# Patient Record
Sex: MALE | Race: BLACK OR AFRICAN AMERICAN | Employment: OTHER | URBAN - METROPOLITAN AREA
[De-identification: names, ages, dates, MRNs, and addresses within clinical notes are randomized per-mention and may not be internally consistent; named-entity substitution may affect disease eponyms.]

---

## 2017-03-20 ENCOUNTER — HOSPITAL ENCOUNTER (OUTPATIENT)
Dept: INFUSION THERAPY | Age: 82
Discharge: HOME | End: 2017-03-20
Payer: MEDICARE

## 2017-03-20 ENCOUNTER — OFFICE VISIT (OUTPATIENT)
Dept: ONCOLOGY | Age: 82
End: 2017-03-20

## 2017-03-20 ENCOUNTER — HOSPITAL ENCOUNTER (OUTPATIENT)
Dept: LAB | Age: 82
Discharge: HOME | End: 2017-03-20
Payer: MEDICARE

## 2017-03-20 ENCOUNTER — HOSPITAL ENCOUNTER (OUTPATIENT)
Dept: ONCOLOGY | Age: 82
Discharge: HOME | End: 2017-03-20

## 2017-03-20 VITALS
HEIGHT: 72 IN | BODY MASS INDEX: 38.47 KG/M2 | DIASTOLIC BLOOD PRESSURE: 52 MMHG | HEART RATE: 55 BPM | WEIGHT: 284 LBS | SYSTOLIC BLOOD PRESSURE: 96 MMHG | TEMPERATURE: 98.6 F

## 2017-03-20 VITALS
RESPIRATION RATE: 18 BRPM | DIASTOLIC BLOOD PRESSURE: 62 MMHG | TEMPERATURE: 98.6 F | HEART RATE: 59 BPM | SYSTOLIC BLOOD PRESSURE: 113 MMHG

## 2017-03-20 DIAGNOSIS — C61 PROSTATE CANCER (HCC): ICD-10-CM

## 2017-03-20 DIAGNOSIS — D46.9 MYELODYSPLASTIC SYNDROME (HCC): ICD-10-CM

## 2017-03-20 DIAGNOSIS — D63.1 ANEMIA DUE TO CHRONIC KIDNEY DISEASE: ICD-10-CM

## 2017-03-20 DIAGNOSIS — D69.6 THROMBOCYTOPENIA (HCC): ICD-10-CM

## 2017-03-20 DIAGNOSIS — C67.9 MALIGNANT NEOPLASM OF URINARY BLADDER, UNSPECIFIED SITE (HCC): ICD-10-CM

## 2017-03-20 DIAGNOSIS — N18.9 ANEMIA DUE TO CHRONIC KIDNEY DISEASE: ICD-10-CM

## 2017-03-20 DIAGNOSIS — E55.9 VITAMIN D DEFICIENCY: ICD-10-CM

## 2017-03-20 DIAGNOSIS — C61 PROSTATE CANCER (HCC): Primary | ICD-10-CM

## 2017-03-20 LAB
ALBUMIN SERPL BCP-MCNC: 3.3 G/DL (ref 3.4–5)
ALBUMIN/GLOB SERPL: 1.1 {RATIO} (ref 0.8–1.7)
ALP SERPL-CCNC: 49 U/L (ref 45–117)
ALT SERPL-CCNC: 10 U/L (ref 16–61)
ANION GAP BLD CALC-SCNC: 6 MMOL/L (ref 3–18)
AST SERPL W P-5'-P-CCNC: 15 U/L (ref 15–37)
BASO+EOS+MONOS # BLD AUTO: 0.2 K/UL (ref 0–2.3)
BASO+EOS+MONOS # BLD AUTO: 8 % (ref 0.1–17)
BILIRUB SERPL-MCNC: 0.5 MG/DL (ref 0.2–1)
BUN SERPL-MCNC: 18 MG/DL (ref 7–18)
BUN/CREAT SERPL: 14 (ref 12–20)
CALCIUM SERPL-MCNC: 8.2 MG/DL (ref 8.5–10.1)
CHLORIDE SERPL-SCNC: 102 MMOL/L (ref 100–108)
CO2 SERPL-SCNC: 31 MMOL/L (ref 21–32)
CREAT SERPL-MCNC: 1.31 MG/DL (ref 0.6–1.3)
DIFFERENTIAL METHOD BLD: ABNORMAL
ERYTHROCYTE [DISTWIDTH] IN BLOOD BY AUTOMATED COUNT: 19.2 % (ref 11.5–14.5)
FERRITIN SERPL-MCNC: 261 NG/ML (ref 8–388)
GLOBULIN SER CALC-MCNC: 3.1 G/DL (ref 2–4)
GLUCOSE SERPL-MCNC: 213 MG/DL (ref 74–99)
HCT VFR BLD AUTO: 27.5 % (ref 36–48)
HGB BLD-MCNC: 7.9 G/DL (ref 12–16)
IRON SATN MFR SERPL: 18 %
IRON SERPL-MCNC: 45 UG/DL (ref 50–175)
LYMPHOCYTES # BLD AUTO: 28 % (ref 14–44)
LYMPHOCYTES # BLD: 0.7 K/UL (ref 1.1–5.9)
MCH RBC QN AUTO: 25 PG (ref 25–35)
MCHC RBC AUTO-ENTMCNC: 28.7 G/DL (ref 31–37)
MCV RBC AUTO: 87 FL (ref 78–102)
NEUTS SEG # BLD: 1.7 K/UL (ref 1.8–9.5)
NEUTS SEG NFR BLD AUTO: 64 % (ref 40–70)
PLATELET # BLD AUTO: 131 K/UL (ref 135–420)
POTASSIUM SERPL-SCNC: 4.4 MMOL/L (ref 3.5–5.5)
PROT SERPL-MCNC: 6.4 G/DL (ref 6.4–8.2)
PSA SERPL-MCNC: 3.3 NG/ML (ref 0–4)
RBC # BLD AUTO: 3.16 M/UL (ref 4.1–5.1)
SODIUM SERPL-SCNC: 139 MMOL/L (ref 136–145)
TIBC SERPL-MCNC: 251 UG/DL (ref 250–450)
WBC # BLD AUTO: 2.6 K/UL (ref 4.5–13)

## 2017-03-20 PROCEDURE — 82728 ASSAY OF FERRITIN: CPT | Performed by: NURSE PRACTITIONER

## 2017-03-20 PROCEDURE — 82306 VITAMIN D 25 HYDROXY: CPT | Performed by: NURSE PRACTITIONER

## 2017-03-20 PROCEDURE — 74011250636 HC RX REV CODE- 250/636: Performed by: NURSE PRACTITIONER

## 2017-03-20 PROCEDURE — 36415 COLL VENOUS BLD VENIPUNCTURE: CPT | Performed by: NURSE PRACTITIONER

## 2017-03-20 PROCEDURE — 96372 THER/PROPH/DIAG INJ SC/IM: CPT

## 2017-03-20 PROCEDURE — 84403 ASSAY OF TOTAL TESTOSTERONE: CPT | Performed by: NURSE PRACTITIONER

## 2017-03-20 PROCEDURE — 84153 ASSAY OF PSA TOTAL: CPT | Performed by: NURSE PRACTITIONER

## 2017-03-20 PROCEDURE — 80053 COMPREHEN METABOLIC PANEL: CPT | Performed by: NURSE PRACTITIONER

## 2017-03-20 PROCEDURE — 83540 ASSAY OF IRON: CPT | Performed by: NURSE PRACTITIONER

## 2017-03-20 PROCEDURE — 82668 ASSAY OF ERYTHROPOIETIN: CPT | Performed by: NURSE PRACTITIONER

## 2017-03-20 RX ADMIN — ERYTHROPOIETIN 40000 UNITS: 40000 INJECTION, SOLUTION INTRAVENOUS; SUBCUTANEOUS at 15:20

## 2017-03-20 RX ADMIN — ERYTHROPOIETIN 20000 UNITS: 20000 INJECTION, SOLUTION INTRAVENOUS; SUBCUTANEOUS at 15:20

## 2017-03-20 NOTE — PROGRESS NOTES
[facility] Progress Note    Date: 2017    Name: Yolanda Rudolph    MRN: [id number]         : 1932      Mr. Alexandra Webb was assessed and education was provided. Care notes reviewed with patient. Patient states he understands side effects and that he has had these injections before. Mr. Maddy Isaac vitals were reviewed and patient was observed for 5 minutes prior to treatment. Visit Vitals    /62 (BP 1 Location: Left arm, BP Patient Position: At rest;Sitting)    Pulse (!) 59    Temp 98.6 °F (37 °C)    Resp 18       Lab results were obtained and reviewed. Recent Results (from the past 12 hour(s))   CBC WITH 3 PART DIFF    Collection Time: 17  1:17 PM   Result Value Ref Range    WBC 2.6 (L) 4.5 - 13.0 K/uL    RBC 3.16 (L) 4.10 - 5.10 M/uL    HGB 7.9 (L) 12.0 - 16 g/dL    HCT 27.5 (L) 36 - 48 %    MCV 87.0 78 - 102 FL    MCH 25.0 25.0 - 35.0 PG    MCHC 28.7 (L) 31 - 37 g/dL    RDW 19.2 (H) 11.5 - 14.5 %    NEUTROPHILS 64 40 - 70 %    MIXED CELLS 8 0.1 - 17 %    LYMPHOCYTES 28 14 - 44 %    ABS. NEUTROPHILS 1.7 (L) 1.8 - 9.5 K/UL    ABS. MIXED CELLS 0.2 0.0 - 2.3 K/uL    ABS. LYMPHOCYTES 0.7 (L) 1.1 - 5.9 K/UL    DF AUTOMATED         Procrit 60,000 units was administered subcutaneous in right upper arm. Mr. Alexandra Webb tolerated well, and had no complaints. Patient armband removed and shredded. Mr. Alexandra Webb was discharged from [place] [address] in stable condition at [phone]. He is to return on April 10 at 1400 for his next appointment.     Deidre Stratton RN  2017

## 2017-03-20 NOTE — MR AVS SNAPSHOT
Visit Information Date & Time Provider Department Dept. Phone Encounter #  
 3/20/2017  1:00 PM Christian Nascimento, [address]  Oncology [phone] [id number] Follow-up Instructions Return in about 3 months (around 6/20/2017). Your Appointments 4/3/2017 10:00 AM  
ESTABLISHED PATIENT with Arturo Oreilly MD  
[facility]) Appt Note: [address] Upcoming Health Maintenance Date Due  
 FOOT EXAM Q1 11/14/1942 EYE EXAM RETINAL OR DILATED Q1 11/14/1942 DTaP/Tdap/Td series (1 - Tdap) 11/14/1953 ZOSTER VACCINE AGE 60> 11/14/1992 GLAUCOMA SCREENING Q2Y 11/14/1997 Pneumococcal 65+ High/Highest Risk (1 of 2 - PCV13) 11/14/1997 MEDICARE YEARLY EXAM 11/14/1997 HEMOGLOBIN A1C Q6M 7/4/2010 MICROALBUMIN Q1 1/4/2011 LIPID PANEL Q1 1/4/2011 INFLUENZA AGE 9 TO ADULT 8/1/2016 Allergies as of 3/20/2017  Review Complete On: 12/19/2016 By: Josue Salazar NP Severity Noted Reaction Type Reactions Allopurinol  05/15/2014    Unknown (comments) Current Immunizations  Never Reviewed No immunizations on file. Not reviewed this visit You Were Diagnosed With   
  
 Codes Comments Prostate cancer [facility])    -  Primary ICD-10-CM: H11 ICD-9-CM: 726 [facility]elodysplastic syndrome ([facility]     ICD-10-CM: D46.9 ICD-9-CM: 238.75 Thrombocytopenia ([facility]     ICD-10-CM: D69.6 ICD-9-CM: 287.5 Anemia due to chronic kidney disease     ICD-10-CM: N18.9, D63.1 ICD-9-CM: 285.21 Malignant neoplasm of urinary bladder, unspecified site [facility])     ICD-10-CM: C67.9 ICD-9-CM: 188. 9 Vitals BP Pulse Temp Height(growth percentile) Weight(growth percentile) BMI  
 (!) 89/42 (!) 57 98.7 °F (37.1 °C) 6' (1.829 m) 284 lb (128.8 kg) 38.52 kg/m2 Smoking Status Former Smoker BMI and BSA Data Body Mass Index Body Surface Area 38.52 kg/m 2 2.56 m 2 Preferred Pharmacy Pharmacy Name Phone [facility] [address] [place] [address], [address] [place] [address] Rd [phone] Your Updated Medication List  
  
   
This list is accurate as of: 3/20/17  2:02 PM.  Always use your most recent med list.  
  
  
  
  
 * allopurinol 300 mg tablet Commonly known as:  Clementeen Perales Take  by mouth daily. * allopurinol 100 mg tablet Commonly known as:  Clementeen Perales Take  by mouth daily. AMARYL 4 mg tablet Generic drug:  glimepiride Take  by mouth every morning. aspirin delayed-release 81 mg tablet Take  by mouth daily. carvedilol 25 mg tablet Commonly known as:  Audrey Spry Take 25 mg by mouth two (2) times daily (with meals). CeleBREX 200 mg capsule Generic drug:  celecoxib Take  by mouth two (2) times a day. Cholecalciferol (Vitamin D3) 3,000 unit Tab Take 6,000 Units by mouth. furosemide 40 mg tablet Commonly known as:  LASIX Take  by mouth daily. gabapentin 300 mg capsule Commonly known as:  NEURONTIN Take 300 mg by mouth three (3) times daily. HumaLOG Mix 75-25 100 unit/mL (75-25) injection Generic drug:  insulin lispro protamine/insulin lispro Iron 325 mg (65 mg iron) tablet Generic drug:  ferrous sulfate Take  by mouth Daily (before breakfast). isosorbide mononitrate ER 30 mg tablet Commonly known as:  IMDUR Take  by mouth daily. LYRICA PO Take  by mouth.  
  
 metFORMIN 500 mg tablet Commonly known as:  GLUCOPHAGE Take  by mouth two (2) times daily (with meals). multivitamin tablet Commonly known as:  ONE A DAY Take 1 Tab by mouth daily. NITROSTAT 0.4 mg SL tablet Generic drug:  nitroglycerin  
by SubLINGual route every five (5) minutes as needed. pioglitazone 15 mg tablet Commonly known as:  ACTOS pravastatin 80 mg tablet Commonly known as:  PRAVACHOL Take 80 mg by mouth nightly. PROCRIT INJECTION  
by Injection route. quinapril 10 mg tablet Commonly known as:  ACCUPRIL Take  by mouth nightly. ranolazine  mg SR tablet Commonly known as:  RANEXA Take  by mouth two (2) times a day. simvastatin 40 mg tablet Commonly known as:  ZOCOR Take  by mouth nightly. sulindac 200 mg tablet Commonly known as:  CLINORIL  
  
 TOPROL XL 50 mg XL tablet Generic drug:  metoprolol succinate Take  by mouth daily. TRAVATAN Z 0.004 % ophthalmic solution Generic drug:  travoprost  
Administer 1 Drop to both eyes every evening. TRUETEST TEST STRIPS strip Generic drug:  glucose blood VI test strips * Notice: This list has 2 medication(s) that are the same as other medications prescribed for you. Read the directions carefully, and ask your doctor or other care provider to review them with you. We Performed the Following COMPLETE CBC & AUTO DIFF WBC [97533 CPT(R)] Follow-up Instructions Return in about 3 months (around 6/20/2017). To-Do List   
 03/20/2017 Lab:  CBC WITH 3 PART DIFF Introducing [facility] SERVICES! Gretel Elizalde introduces [email or web address] patient portal. Now you can access parts of your medical record, email your doctor's office, and request medication refills online. 1. In your internet browser, go to [email or web address] 2. Click on the First Time User? Click Here link in the Sign In box. You will see the New Member Sign Up page. 3. Enter your [email or web address] Access Code exactly as it appears below. You will not need to use this code after youve completed the sign-up process. If you do not sign up before the expiration date, you must request a new code. · [email or web address] Access Code: [email or web address] Expires: 6/18/2017  1:54 PM 
 
 4. Enter the last four digits of your Social Security Number (xxxx) and Date of Birth (mm/dd/yyyy) as indicated and click Submit. You will be taken to the next sign-up page. 5. Create a [employer] ID. This will be your [employer] login ID and cannot be changed, so think of one that is secure and easy to remember. 6. Create a Brightstar password. You can change your password at any time. 7. Enter your Password Reset Question and Answer. This can be used at a later time if you forget your password. 8. Enter your e-mail address. You will receive e-mail notification when new information is available in [address] 9. Click Sign Up. You can now view and download portions of your medical record. 10. Click the Download Summary menu link to download a portable copy of your medical information. If you have questions, please visit the Frequently Asked Questions section of the Bright[employer] website. Remember, [employer] is NOT to be used for urgent needs. For medical emergencies, dial 911. Now available from your iPhone and Android! Please provide this summary of care documentation to your next provider. Your primary care clinician is listed as Ashok Taylor. If you have any questions after today's visit, please call [phone].

## 2017-03-20 NOTE — PROGRESS NOTES
Hematology/Oncology  Progress Note    Name: Glory Arthur  Date: 3/20/2017  : 1932    PCP: Vanessa Singh MD     Mr. Hermilo Deng is a 80year old male who was seen for management of his myelodysplastic syndrome. The patient has a history of bladder cancer and prostate cancer    Current therapy Procrit 60,000 inits SQ Whenever Hgb less than 10g/dl and  Hematocrit is below 30% respectively    Subjective:     Mr. Alvin Elias is an 66-year-old [facility] [place] man who has an underlying diagnosis of refractory anemia/myelodysplastic syndrome. He is doing reasonably well. He is here today for a follow-up visit. He recently underwent a surgical procedure by his urologist. He states he has an appointment on Wednesday 3/22/2017. He has a history of bladder cancer. He continues to complain of  fatigue and weakness. Occasionally he does experience some shortness of breath with exertion. He also has long-standing arthritic discomfort and this is essentially unchanged. Today he is using a cane for mobility support. He has not noticed any blood in his urine or stool. He also has a history of prostate cancer and receives Eligard every 4 months. He has no additional complaints. Past Medical History:   Diagnosis Date    Arthritis     [facility] ([facility]     Diabetes ([facility]     INsulin dependent    Gout     Myelodysplastic syndrome, unspecified ([facility]     Personal history of prostate cancer     Renal cyst      Past Surgical History:   Procedure Laterality Date    HX BACK SURGERY      HX CATARACT REMOVAL      HX HERNIA REPAIR      HX KNEE REPLACEMENT       Social History     Social History    Marital status:      Spouse name: N/A    Number of children: N/A    Years of education: N/A     Occupational History    Not on file.      Social History Main Topics    Smoking status: Former Smoker     Types: Cigarettes     Quit date: 1994    Smokeless tobacco: Never Used    Alcohol use No    Drug use: No    Sexual activity: Not on file     Other Topics Concern    Not on file     Social History Narrative     Family History   Problem Relation Age of Onset    Diabetes Mother     Stroke Mother      Current Outpatient Prescriptions   Medication Sig Dispense Refill    Cholecalciferol, Vitamin D3, 3,000 unit tab Take 6,000 Units by mouth.  sulindac (CLINORIL) 200 mg tablet   0    aspirin delayed-release 81 mg tablet Take  by mouth daily.  carvedilol (COREG) 25 mg tablet Take 25 mg by mouth two (2) times daily (with meals).  ferrous sulfate (IRON) 325 mg (65 mg iron) tablet Take  by mouth Daily (before breakfast).  travoprost (TRAVATAN Z) 0.004 % ophthalmic solution Administer 1 Drop to both eyes every evening.  isosorbide mononitrate ER (IMDUR) 30 mg tablet Take  by mouth daily.  pravastatin (PRAVACHOL) 80 mg tablet Take 80 mg by mouth nightly.  ranolazine ER (RANEXA) 500 mg SR tablet Take  by mouth two (2) times a day.  TRUETEST TEST STRIPS strip       HUMALOG MIX 75-25 100 unit/mL (75-25) susp       pioglitazone (ACTOS) 15 mg tablet       nitroglycerin (NITROSTAT) 0.4 mg SL tablet by SubLINGual route every five (5) minutes as needed.  simvastatin (ZOCOR) 40 mg tablet Take  by mouth nightly.  multivitamin (ONE A DAY) tablet Take 1 Tab by mouth daily.  allopurinol (ZYLOPRIM) 100 mg tablet Take  by mouth daily.  gabapentin (NEURONTIN) 300 mg capsule Take 300 mg by mouth three (3) times daily.  furosemide (LASIX) 40 mg tablet Take  by mouth daily.  quinapril (ACCUPRIL) 10 mg tablet Take  by mouth nightly.  metoprolol-XL (TOPROL XL) 50 mg XL tablet Take  by mouth daily.  PREGABALIN (LYRICA PO) Take  by mouth.  allopurinol (ZYLOPRIM) 300 mg tablet Take  by mouth daily.  celecoxib (CELEBREX) 200 mg capsule Take  by mouth two (2) times a day.  metFORMIN (GLUCOPHAGE) 500 mg tablet Take  by mouth two (2) times daily (with meals).  glimepiride (AMARYL) 4 mg tablet Take  by mouth every morning.  EPOETIN BERYL (PROCRIT IJ) by Injection route. Review of Systems  Constitutional: The patient has complaints of fatigue and weakness  HEENT: The patient denies recent head trauma, eye pain, blurred vision,  hearing deficit, oropharyngeal mucosal pain or lesions, and the patient denies throat pain or discomfort. Lymphatics: The patient denies palpable peripheral lymphadenopathy. Hematologic: The patient denies having bruising, bleeding, or progressive fatigue. Respiratory: Patient denies having shortness of breath, cough, sputum production, fever, or dyspnea on exertion. Cardiovascular: The patient denies having leg pain, leg swelling, heart palpitations, chest pain, or lightheadedness. The patient denies having dyspnea on exertion. Gastrointestinal: The patient denies having nausea, emesis, or diarrhea. The patient denies having any hematemesis or blood in the stool. Genitourinary: Patient denies having urinary urgency, frequency, or dysuria. The patient denies having blood in the urine. Psychological: The patient denies having symptoms of nervousness, anxiety, depression, or thoughts of harming himself some of this. Skin: Patient denies having skin rashes, skin, ulcerations, or unexplained itching or pruritus. Musculoskeletal: The patient has long-standing arthritic discomfort in his lower extremities. He is using a cane for mobility support. Objective:     Visit Vitals    BP 96/52    Pulse (!) 55    Temp 98.6 °F (37 °C) (Oral)    Ht 6' (1.829 m)    Wt 128.8 kg (284 lb)    BMI 38.52 kg/m2     ECOG PS=0, Pain score=3/10    Physical Exam:   Gen. Appearance: The patient is in no acute distress. Skin: There is no bruise or rash. HEENT: The exam is unremarkable. Neck: Supple without lymphadenopathy or thyromegaly. Lungs: Clear to auscultation and percussion; there are no wheezes or rhonchi.   Heart: Regular rate and rhythm; there are no murmurs, gallops, or rubs. Abdomen: Bowel sounds are present and normal.  There is no guarding, tenderness, or hepatosplenomegaly. Extremities: There is no clubbing, cyanosis, or edema. Neurologic: There are no focal neurologic deficits. Lymphatics: There is no palpable peripheral lymphadenopathy. Musculoskeletal: The patient has full range of motion at all joints. There is no evidence of joint deformity or effusions. There is no focal joint tenderness. Psychological/psychiatric: There is no clinical evidence of anxiety, depression, or melancholy. Lab data:      Results for orders placed or performed during the hospital encounter of 03/20/17   CBC WITH 3 PART DIFF     Status: Abnormal   Result Value Ref Range Status    WBC 2.6 (L) 4.5 - 13.0 K/uL Final    RBC 3.16 (L) 4.10 - 5.10 M/uL Final    HGB 7.9 (L) 12.0 - 16 g/dL Final    HCT 27.5 (L) 36 - 48 % Final    MCV 87.0 78 - 102 FL Final    MCH 25.0 25.0 - 35.0 PG Final    MCHC 28.7 (L) 31 - 37 g/dL Final    RDW 19.2 (H) 11.5 - 14.5 % Final    NEUTROPHILS 64 40 - 70 % Final    MIXED CELLS 8 0.1 - 17 % Final    LYMPHOCYTES 28 14 - 44 % Final    ABS. NEUTROPHILS 1.7 (L) 1.8 - 9.5 K/UL Final    ABS. MIXED CELLS 0.2 0.0 - 2.3 K/uL Final    ABS. LYMPHOCYTES 0.7 (L) 1.1 - 5.9 K/UL Final     Comment: Test performed at [address] Results Reviewed by Medical Director. DF AUTOMATED   Final           Assessment:   1. Myelodysplastic syndrome  2. Leukopenia  3. Thrombocytopenia  4. 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 History of Prostate cancer/History of bladder cancer     Plan:    Myelodysplastic syndrome: I have explained to the patient that his CBC today reveals that his WBC 2.6,  hemoglobin 7.9 g/dL, hematocrit 27.5%, and his preliminary platelet count is 308,235. We will continue to monitor his CBC at 3 month intervals.   Procrit will be provided whenever the hemoglobin is below 10 g/dL and hematocrit is below 30%. Patient will begun procrit today. An iron profile and ferritin level will be ordered along with a comprehensive metabolic panel. A EPO level will also be obtained    Leukopenia: The current CBC shows a decline WBC count of 2.6 and the absolute neutrophil count is 1.7. I have explained to the patient that if the absolute neutrophil count continues to decline below 1.0 we may need to offer him treatment with either  Neupogen or Neulasta. Pt denies any fevers, unexplained infections. Thrombocytopenia: The current CBC shows that his preliminary  platelet counts are relatively stable at 110 ,000. Therapeutic intervention is not necessary unless the platelet counts decline below 30,000. History of prostate cancer: The patient is clinically stable. The patient continues to receive Eligard every 4 months. I will order a PSA level today. He also has an appointment with Urology on wednesday 3/22/2017. History of bladder cancer:  Patient is s/p for a urological procedure on 12/2/2016 by Dr. Elliot Aguiar. He will follow up with him on 3/22/2017. Arthritis: The patient is using Tramadol which provides adequate relief. This will continue. I will have the patient return in 3 months for a complete assessment. CBC will be checked every 3 weeks and Procrit will be provided if his hematocrit decline below 30%.   Orders Placed This Encounter    COMPLETE CBC & AUTO DIFF WBC    InHouse CBC ([email or web address])     Standing Status:   Future     Number of Occurrences:   1     Standing Expiration Date:   3/27/2017    IRON PROFILE     Standing Status:   Future     Standing Expiration Date:   3/21/2018    FERRITIN     Standing Status:   Future     Standing Expiration Date:   3/97/6555    METABOLIC PANEL, COMPREHENSIVE     Standing Status:   Future     Standing Expiration Date:   3/21/2018    VITAMIN D, 25 HYDROXY     Standing Status:   Future     Standing Expiration Date:   3/20/2017    PROSTATE SPECIFIC AG     Standing Status:   Future     Standing Expiration Date:   3/21/2018    TESTOSTERONE, TOTAL, ADULT MALE     Standing Status:   Future     Standing Expiration Date:   3/21/2018    ERYTHROPOIETIN     Standing Status:   Future     Standing Expiration Date:   9/20/2017       Saúl Calhoun NP  3/20/2017       I have assessed the patient independently and  agree with the full assessment as outlined.   Preet Gutierrez MD, [place] [place]

## 2017-03-21 LAB
25(OH)D3 SERPL-MCNC: 27.6 NG/ML (ref 30–100)
COMMENT, TESC2: ABNORMAL
TESTOST SERPL-MCNC: <3 NG/DL (ref 348–1197)

## 2017-03-22 LAB — EPO SERPL-ACNC: 120.2 MIU/ML (ref 2.6–18.5)

## 2017-05-01 ENCOUNTER — HOSPITAL ENCOUNTER (OUTPATIENT)
Dept: INFUSION THERAPY | Age: 82
Discharge: HOME | End: 2017-05-01
Payer: MEDICARE

## 2017-05-01 VITALS
TEMPERATURE: 98.7 F | DIASTOLIC BLOOD PRESSURE: 62 MMHG | HEART RATE: 56 BPM | RESPIRATION RATE: 18 BRPM | OXYGEN SATURATION: 96 % | SYSTOLIC BLOOD PRESSURE: 122 MMHG

## 2017-05-01 LAB
BASO+EOS+MONOS # BLD AUTO: 0.2 K/UL (ref 0–2.3)
BASO+EOS+MONOS # BLD AUTO: 8 % (ref 0.1–17)
DIFFERENTIAL METHOD BLD: ABNORMAL
ERYTHROCYTE [DISTWIDTH] IN BLOOD BY AUTOMATED COUNT: 19.5 % (ref 11.5–14.5)
HCT VFR BLD AUTO: 29.1 % (ref 36–48)
HGB BLD-MCNC: 8.1 G/DL (ref 12–16)
LYMPHOCYTES # BLD AUTO: 38 % (ref 14–44)
LYMPHOCYTES # BLD: 0.9 K/UL (ref 1.1–5.9)
MCH RBC QN AUTO: 24.5 PG (ref 25–35)
MCHC RBC AUTO-ENTMCNC: 27.8 G/DL (ref 31–37)
MCV RBC AUTO: 87.9 FL (ref 78–102)
NEUTS SEG # BLD: 1.4 K/UL (ref 1.8–9.5)
NEUTS SEG NFR BLD AUTO: 54 % (ref 40–70)
PLATELET # BLD AUTO: 109 K/UL (ref 135–420)
RBC # BLD AUTO: 3.31 M/UL (ref 4.1–5.1)
WBC # BLD AUTO: 2.5 K/UL (ref 4.5–13)

## 2017-05-01 PROCEDURE — 85025 COMPLETE CBC W/AUTO DIFF WBC: CPT | Performed by: INTERNAL MEDICINE

## 2017-05-01 PROCEDURE — 85049 AUTOMATED PLATELET COUNT: CPT | Performed by: INTERNAL MEDICINE

## 2017-05-01 PROCEDURE — 96372 THER/PROPH/DIAG INJ SC/IM: CPT

## 2017-05-01 PROCEDURE — 36415 COLL VENOUS BLD VENIPUNCTURE: CPT

## 2017-05-01 PROCEDURE — 74011250636 HC RX REV CODE- 250/636: Performed by: NURSE PRACTITIONER

## 2017-05-01 RX ADMIN — ERYTHROPOIETIN 20000 UNITS: 20000 INJECTION, SOLUTION INTRAVENOUS; SUBCUTANEOUS at 14:06

## 2017-05-01 RX ADMIN — ERYTHROPOIETIN 40000 UNITS: 40000 INJECTION, SOLUTION INTRAVENOUS; SUBCUTANEOUS at 14:06

## 2017-05-22 ENCOUNTER — HOSPITAL ENCOUNTER (OUTPATIENT)
Dept: INFUSION THERAPY | Age: 82
End: 2017-05-22
Payer: MEDICARE

## 2017-05-26 ENCOUNTER — HOSPITAL ENCOUNTER (OUTPATIENT)
Dept: INFUSION THERAPY | Age: 82
Discharge: HOME | End: 2017-05-26
Payer: MEDICARE

## 2017-05-26 VITALS
RESPIRATION RATE: 18 BRPM | OXYGEN SATURATION: 98 % | TEMPERATURE: 97.6 F | SYSTOLIC BLOOD PRESSURE: 134 MMHG | HEART RATE: 61 BPM | DIASTOLIC BLOOD PRESSURE: 67 MMHG

## 2017-05-26 LAB
BASO+EOS+MONOS # BLD AUTO: 0.2 K/UL (ref 0–2.3)
BASO+EOS+MONOS # BLD AUTO: 9 % (ref 0.1–17)
DIFFERENTIAL METHOD BLD: ABNORMAL
ERYTHROCYTE [DISTWIDTH] IN BLOOD BY AUTOMATED COUNT: 18.4 % (ref 11.5–14.5)
HCT VFR BLD AUTO: 31 % (ref 36–48)
HGB BLD-MCNC: 9 G/DL (ref 12–16)
LYMPHOCYTES # BLD AUTO: 32 % (ref 14–44)
LYMPHOCYTES # BLD: 0.6 K/UL (ref 1.1–5.9)
MCH RBC QN AUTO: 25 PG (ref 25–35)
MCHC RBC AUTO-ENTMCNC: 29 G/DL (ref 31–37)
MCV RBC AUTO: 86.1 FL (ref 78–102)
NEUTS SEG # BLD: 1.1 K/UL (ref 1.8–9.5)
NEUTS SEG NFR BLD AUTO: 59 % (ref 40–70)
PLATELET # BLD AUTO: 102 K/UL (ref 135–420)
RBC # BLD AUTO: 3.6 M/UL (ref 4.1–5.1)
WBC # BLD AUTO: 1.9 K/UL (ref 4.5–13)

## 2017-05-26 PROCEDURE — 85049 AUTOMATED PLATELET COUNT: CPT | Performed by: INTERNAL MEDICINE

## 2017-05-26 PROCEDURE — 36415 COLL VENOUS BLD VENIPUNCTURE: CPT

## 2017-05-26 PROCEDURE — 96372 THER/PROPH/DIAG INJ SC/IM: CPT

## 2017-05-26 PROCEDURE — 85025 COMPLETE CBC W/AUTO DIFF WBC: CPT | Performed by: INTERNAL MEDICINE

## 2017-05-26 NOTE — PROGRESS NOTES
[address] [facility] Progress Note    Date: May 26, 2017    Name: Rochelle Macias    MRN: [id number]         : 1932    PROCRIT INJECTION    Mr. Guanako Hernandez was assessed and education was provided. Care notes reviewed with patient. Mr. Nico Perez vitals were reviewed and patient was observed for 5 minutes prior to treatment. Visit Vitals    /67 (BP 1 Location: Left arm, BP Patient Position: Post activity)    Pulse 61    Temp 97.6 °F (36.4 °C)    Resp 18    SpO2 98%     Blood drawn for CBC via left AC x 1 atttempt. Gauze and tape applied. Lab results were obtained and reviewed. Recent Results (from the past 12 hour(s))   CBC WITH 3 PART DIFF    Collection Time: 17  9:26 AM   Result Value Ref Range    WBC 1.9 (L) 4.5 - 13.0 K/uL    RBC 3.60 (L) 4.10 - 5.10 M/uL    HGB 9.0 (L) 12.0 - 16 g/dL    HCT 31.0 (L) 36 - 48 %    MCV 86.1 78 - 102 FL    MCH 25.0 25.0 - 35.0 PG    MCHC 29.0 (L) 31 - 37 g/dL    RDW 18.4 (H) 11.5 - 14.5 %    NEUTROPHILS 59 40 - 70 %    MIXED CELLS 9 0.1 - 17 %    LYMPHOCYTES 32 14 - 44 %    ABS. NEUTROPHILS 1.1 (L) 1.8 - 9.5 K/UL    ABS. MIXED CELLS 0.2 0.0 - 2.3 K/uL    ABS. LYMPHOCYTES 0.6 (L) 1.1 - 5.9 K/UL    DF AUTOMATED         Results within ordered parameters to HOLD procrit today. Mr. Guanako Hernandez tolerated well, and had no complaints. Patient armband removed and shredded. Mr. Guanako Hernandez was discharged from [place] [address] in stable condition at [address] [place] [address]. He is to return on 2017 at 0900 for his next appointment.     Laura Giles RN  May 26, 2017

## 2017-06-12 ENCOUNTER — APPOINTMENT (OUTPATIENT)
Dept: INFUSION THERAPY | Age: 82
End: 2017-06-12
Payer: MEDICARE

## 2017-06-16 ENCOUNTER — HOSPITAL ENCOUNTER (OUTPATIENT)
Dept: INFUSION THERAPY | Age: 82
Discharge: HOME | End: 2017-06-16
Payer: MEDICARE

## 2017-06-16 VITALS
TEMPERATURE: 97.7 F | HEART RATE: 56 BPM | DIASTOLIC BLOOD PRESSURE: 60 MMHG | SYSTOLIC BLOOD PRESSURE: 129 MMHG | OXYGEN SATURATION: 99 % | RESPIRATION RATE: 18 BRPM

## 2017-06-16 LAB
BASO+EOS+MONOS # BLD AUTO: 0.1 K/UL (ref 0–2.3)
BASO+EOS+MONOS # BLD AUTO: 6 % (ref 0.1–17)
DIFFERENTIAL METHOD BLD: ABNORMAL
ERYTHROCYTE [DISTWIDTH] IN BLOOD BY AUTOMATED COUNT: 19.2 % (ref 11.5–14.5)
HCT VFR BLD AUTO: 29.3 % (ref 36–48)
HGB BLD-MCNC: 8.6 G/DL (ref 12–16)
LYMPHOCYTES # BLD AUTO: 42 % (ref 14–44)
LYMPHOCYTES # BLD: 0.8 K/UL (ref 1.1–5.9)
MCH RBC QN AUTO: 25.3 PG (ref 25–35)
MCHC RBC AUTO-ENTMCNC: 29.4 G/DL (ref 31–37)
MCV RBC AUTO: 86.2 FL (ref 78–102)
NEUTS SEG # BLD: 1.1 K/UL (ref 1.8–9.5)
NEUTS SEG NFR BLD AUTO: 51 % (ref 40–70)
PLATELET # BLD AUTO: 99 K/UL (ref 135–420)
RBC # BLD AUTO: 3.4 M/UL (ref 4.1–5.1)
WBC # BLD AUTO: 2 K/UL (ref 4.5–13)

## 2017-06-16 PROCEDURE — 85025 COMPLETE CBC W/AUTO DIFF WBC: CPT | Performed by: INTERNAL MEDICINE

## 2017-06-16 PROCEDURE — 36415 COLL VENOUS BLD VENIPUNCTURE: CPT

## 2017-06-16 PROCEDURE — 96372 THER/PROPH/DIAG INJ SC/IM: CPT

## 2017-06-16 PROCEDURE — 74011250636 HC RX REV CODE- 250/636: Performed by: NURSE PRACTITIONER

## 2017-06-16 RX ADMIN — ERYTHROPOIETIN 40000 UNITS: 40000 INJECTION, SOLUTION INTRAVENOUS; SUBCUTANEOUS at 10:00

## 2017-06-16 RX ADMIN — ERYTHROPOIETIN 20000 UNITS: 20000 INJECTION, SOLUTION INTRAVENOUS; SUBCUTANEOUS at 10:00

## 2017-06-16 NOTE — PROGRESS NOTES
[address] [facility] Progress Note    Date: 2017    Name: Talisha Abdi    MRN: [id number]         : 1932    PROCRIT INJECTION    Mr. Leola Hughes was assessed and education was provided. Mr. Aneita Lesch vitals were reviewed and patient was observed for 5 minutes prior to treatment. Visit Vitals    /60 (BP 1 Location: Left arm, BP Patient Position: Sitting)    Pulse (!) 56    Temp 97.7 °F (36.5 °C)    Resp 18    SpO2 99%     Blood drawn for CBC via left forearm x 2 atttempt. Gauze and tape applied. Lab results were obtained and reviewed. Recent Results (from the past 12 hour(s))   CBC WITH 3 PART DIFF    Collection Time: 17  9:53 AM   Result Value Ref Range    WBC 2.0 (L) 4.5 - 13.0 K/uL    RBC 3.40 (L) 4.10 - 5.10 M/uL    HGB 8.6 (L) 12.0 - 16 g/dL    HCT 29.3 (L) 36 - 48 %    MCV 86.2 78 - 102 FL    MCH 25.3 25.0 - 35.0 PG    MCHC 29.4 (L) 31 - 37 g/dL    RDW 19.2 (H) 11.5 - 14.5 %    NEUTROPHILS 51 40 - 70 %    MIXED CELLS 6 0.1 - 17 %    LYMPHOCYTES 42 14 - 44 %    ABS. NEUTROPHILS 1.1 (L) 1.8 - 9.5 K/UL    ABS. MIXED CELLS 0.1 0.0 - 2.3 K/uL    ABS. LYMPHOCYTES 0.8 (L) 1.1 - 5.9 K/UL    DF AUTOMATED         Results within ordered parameters to administer procrit today. Procrit 60,000 units administered SQ in the back of the left arm. Band-aid applied. Mr. Leola Hughes tolerated well, and had no complaints. Patient armband removed and shredded. Mr. Leola Hughes was discharged from [place] [address] in stable condition at 1005. He is to return on 2017 at 0900 for his next appointment.     Jayden Reis RN  2017

## 2017-07-03 ENCOUNTER — HOSPITAL ENCOUNTER (OUTPATIENT)
Dept: INFUSION THERAPY | Age: 82
End: 2017-07-03
Payer: MEDICARE

## 2017-07-03 ENCOUNTER — APPOINTMENT (OUTPATIENT)
Dept: INFUSION THERAPY | Age: 82
End: 2017-07-03
Payer: MEDICARE

## 2017-07-17 ENCOUNTER — HOSPITAL ENCOUNTER (OUTPATIENT)
Dept: INFUSION THERAPY | Age: 82
Discharge: HOME | End: 2017-07-17
Payer: MEDICARE

## 2017-07-17 ENCOUNTER — OFFICE VISIT (OUTPATIENT)
Dept: ONCOLOGY | Age: 82
End: 2017-07-17

## 2017-07-17 ENCOUNTER — HOSPITAL ENCOUNTER (OUTPATIENT)
Dept: ONCOLOGY | Age: 82
Discharge: HOME | End: 2017-07-17

## 2017-07-17 VITALS
RESPIRATION RATE: 18 BRPM | SYSTOLIC BLOOD PRESSURE: 122 MMHG | OXYGEN SATURATION: 96 % | HEART RATE: 58 BPM | DIASTOLIC BLOOD PRESSURE: 56 MMHG | TEMPERATURE: 97 F

## 2017-07-17 VITALS
OXYGEN SATURATION: 95 % | HEIGHT: 72 IN | BODY MASS INDEX: 38.39 KG/M2 | DIASTOLIC BLOOD PRESSURE: 55 MMHG | HEART RATE: 56 BPM | WEIGHT: 283.4 LBS | TEMPERATURE: 97 F | SYSTOLIC BLOOD PRESSURE: 119 MMHG

## 2017-07-17 DIAGNOSIS — C61 PROSTATE CANCER (HCC): ICD-10-CM

## 2017-07-17 DIAGNOSIS — N18.9 ANEMIA DUE TO CHRONIC KIDNEY DISEASE: ICD-10-CM

## 2017-07-17 DIAGNOSIS — C67.0 MALIGNANT NEOPLASM OF TRIGONE OF URINARY BLADDER (HCC): ICD-10-CM

## 2017-07-17 DIAGNOSIS — D69.6 THROMBOCYTOPENIA (HCC): ICD-10-CM

## 2017-07-17 DIAGNOSIS — E55.9 VITAMIN D DEFICIENCY: ICD-10-CM

## 2017-07-17 DIAGNOSIS — D46.9 MYELODYSPLASTIC SYNDROME (HCC): Primary | ICD-10-CM

## 2017-07-17 DIAGNOSIS — D63.1 ANEMIA DUE TO CHRONIC KIDNEY DISEASE: ICD-10-CM

## 2017-07-17 LAB
BASO+EOS+MONOS # BLD AUTO: 0.2 K/UL (ref 0–2.3)
BASO+EOS+MONOS # BLD AUTO: 7 % (ref 0.1–17)
DIFFERENTIAL METHOD BLD: ABNORMAL
ERYTHROCYTE [DISTWIDTH] IN BLOOD BY AUTOMATED COUNT: 18.9 % (ref 11.5–14.5)
HCT VFR BLD AUTO: 29.1 % (ref 36–48)
HGB BLD-MCNC: 8.5 G/DL (ref 12–16)
LYMPHOCYTES # BLD AUTO: 33 % (ref 14–44)
LYMPHOCYTES # BLD: 0.7 K/UL (ref 1.1–5.9)
MCH RBC QN AUTO: 25.1 PG (ref 25–35)
MCHC RBC AUTO-ENTMCNC: 29.2 G/DL (ref 31–37)
MCV RBC AUTO: 86.1 FL (ref 78–102)
NEUTS SEG # BLD: 1.2 K/UL (ref 1.8–9.5)
NEUTS SEG NFR BLD AUTO: 60 % (ref 40–70)
PLATELET # BLD AUTO: 111 K/UL (ref 135–420)
RBC # BLD AUTO: 3.38 M/UL (ref 4.1–5.1)
WBC # BLD AUTO: 2.1 K/UL (ref 4.5–13)

## 2017-07-17 PROCEDURE — 96372 THER/PROPH/DIAG INJ SC/IM: CPT

## 2017-07-17 PROCEDURE — 74011250636 HC RX REV CODE- 250/636: Performed by: NURSE PRACTITIONER

## 2017-07-17 RX ADMIN — ERYTHROPOIETIN 20000 UNITS: 20000 INJECTION, SOLUTION INTRAVENOUS; SUBCUTANEOUS at 16:27

## 2017-07-17 RX ADMIN — ERYTHROPOIETIN 40000 UNITS: 40000 INJECTION, SOLUTION INTRAVENOUS; SUBCUTANEOUS at 16:27

## 2017-07-17 NOTE — PROGRESS NOTES
Hematology/Oncology  Progress Note    Name: Vianca Ramirez  Date: 2017  : 1932    PCP: Rony Stone MD     Mr. Rosibel Riggs is a 80year old male who was seen for management of his myelodysplastic syndrome. The patient has a history of bladder cancer and prostate cancer    Current therapy Procrit 60,000 inits SQ Whenever Hgb less than 10g/dl and  Hematocrit is below 30% respectively    Subjective:     Mr. Kwesi Godoy is an 70-year-old [facility] American man who has an underlying diagnosis of refractory anemia/myelodysplastic syndrome. He is doing reasonably well. He is here today for a follow-up visit. He has a history of bladder cancer. He continues to complain of  fatigue and weakness. Occasionally he does experience some shortness of breath with exertion. He also has long-standing arthritic discomfort and this is essentially unchanged. Today he is using a cane for mobility support. He has not noticed any blood in his urine or stool. He also has a history of prostate cancer and receives Eligard every 4 months. He has no additional complaints. Past Medical History:   Diagnosis Date    Arthritis     [facility]     Diabetes ([facility] [facility]     INsulin dependent    Gout     Myelodysplastic syndrome, unspecified ([facility] [facility]     Personal history of prostate cancer     Renal cyst      Past Surgical History:   Procedure Laterality Date    HX BACK SURGERY      HX CATARACT REMOVAL      HX HERNIA REPAIR      HX KNEE REPLACEMENT       Social History     Social History    Marital status:      Spouse name: N/A    Number of children: N/A    Years of education: N/A     Occupational History    Not on file.      Social History Main Topics    Smoking status: Former Smoker     Types: Cigarettes     Quit date: 1994    Smokeless tobacco: Never Used    Alcohol use No    Drug use: No    Sexual activity: Not on file     Other Topics Concern    Not on file     Social History Narrative     Family History Problem Relation Age of Onset    Diabetes Mother     Stroke Mother      Current Outpatient Prescriptions   Medication Sig Dispense Refill    traMADol (ULTRAM) 50 mg tablet Take 50 mg by mouth every six (6) hours as needed for Pain.  amLODIPine (NORVASC) 5 mg tablet Take 5 mg by mouth daily.  insulin glargine (LANTUS) 100 unit/mL injection by SubCUTAneous route nightly.  Cholecalciferol, Vitamin D3, 3,000 unit tab Take 6,000 Units by mouth.  aspirin delayed-release 81 mg tablet Take  by mouth daily.  carvedilol (COREG) 25 mg tablet Take 25 mg by mouth two (2) times daily (with meals).  isosorbide mononitrate ER (IMDUR) 30 mg tablet Take  by mouth daily.  TRUETEST TEST STRIPS strip       pioglitazone (ACTOS) 15 mg tablet       nitroglycerin (NITROSTAT) 0.4 mg SL tablet by SubLINGual route every five (5) minutes as needed.  simvastatin (ZOCOR) 40 mg tablet Take  by mouth nightly.  allopurinol (ZYLOPRIM) 100 mg tablet Take  by mouth daily.  gabapentin (NEURONTIN) 300 mg capsule Take 300 mg by mouth three (3) times daily.  furosemide (LASIX) 40 mg tablet Take  by mouth daily. Review of Systems  Constitutional: The patient has complaints of fatigue and weakness  HEENT: The patient denies recent head trauma, eye pain, blurred vision,  hearing deficit, oropharyngeal mucosal pain or lesions, and the patient denies throat pain or discomfort. Lymphatics: The patient denies palpable peripheral lymphadenopathy. Hematologic: The patient denies having bruising, bleeding, or progressive fatigue. Respiratory: Patient denies having shortness of breath, cough, sputum production, fever, or dyspnea on exertion. Cardiovascular: The patient denies having leg pain, leg swelling, heart palpitations, chest pain, or lightheadedness. The patient denies having dyspnea on exertion. Gastrointestinal: The patient denies having nausea, emesis, or diarrhea.  The patient denies having any hematemesis or blood in the stool. Genitourinary: Patient denies having urinary urgency, frequency, or dysuria. The patient denies having blood in the urine. Psychological: The patient denies having symptoms of nervousness, anxiety, depression, or thoughts of harming himself some of this. Skin: Patient denies having skin rashes, skin, ulcerations, or unexplained itching or pruritus. Musculoskeletal: The patient has long-standing arthritic discomfort in his lower extremities. He is using a cane for mobility support. Objective:     Visit Vitals    /55    Pulse (!) 56    Temp 97 °F (36.1 °C)    Ht 6' (1.829 m)    Wt 128.5 kg (283 lb 6.4 oz)    SpO2 95%    BMI 38.44 kg/m2     ECOG PS=0, Pain score=3/10    Physical Exam:   Gen. Appearance: The patient is in no acute distress. Skin: There is no bruise or rash. HEENT: The exam is unremarkable. Neck: Supple without lymphadenopathy or thyromegaly. Lungs: Clear to auscultation and percussion; there are no wheezes or rhonchi. Heart: Regular rate and rhythm; there are no murmurs, gallops, or rubs. Abdomen: Bowel sounds are present and normal.  There is no guarding, tenderness, or hepatosplenomegaly. Extremities: There is no clubbing, cyanosis, or edema. Neurologic: There are no focal neurologic deficits. Lymphatics: There is no palpable peripheral lymphadenopathy. Musculoskeletal: The patient has full range of motion at all joints. There is no evidence of joint deformity or effusions. There is no focal joint tenderness. Psychological/psychiatric: There is no clinical evidence of anxiety, depression, or melancholy.     Lab data:      Results for orders placed or performed during the hospital encounter of 07/17/17   CBC WITH 3 PART DIFF     Status: Abnormal   Result Value Ref Range Status    WBC 2.1 (L) 4.5 - 13.0 K/uL Final    RBC 3.38 (L) 4.10 - 5.10 M/uL Final    HGB 8.5 (L) 12.0 - 16 g/dL Final    HCT 29.1 (L) 36 - 48 % Final MCV 86.1 78 - 102 FL Final    MCH 25.1 25.0 - 35.0 PG Final    MCHC 29.2 (L) 31 - 37 g/dL Final    RDW 18.9 (H) 11.5 - 14.5 % Final    NEUTROPHILS 60 40 - 70 % Final    MIXED CELLS 7 0.1 - 17 % Final    LYMPHOCYTES 33 14 - 44 % Final    ABS. NEUTROPHILS 1.2 (L) 1.8 - 9.5 K/UL Final    ABS. MIXED CELLS 0.2 0.0 - 2.3 K/uL Final    ABS. LYMPHOCYTES 0.7 (L) 1.1 - 5.9 K/UL Final     Comment: Test performed at [place] [address] Location. Results Reviewed by Medical Director. DF AUTOMATED   Final           Assessment:   1. Myelodysplastic syndrome  2. Leukopenia  3. Thrombocytopenia  4. 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 History of Prostate cancer/History of bladder cancer     Plan:    Myelodysplastic syndrome: I have explained to the patient that his CBC today reveals that his WBC 2.1,  hemoglobin 8.5 g/dL, hematocrit 29.1%, and his preliminary platelet count is 206,792. We will continue to monitor his CBC at 3 month intervals. Procrit will be provided whenever the hemoglobin is below 10 g/dL and hematocrit is below 30%. Patient will receive procrit today. An iron profile and ferritin level will be ordered along with a comprehensive metabolic panel. A EPO level will also be obtained    Leukopenia: The current CBC shows a decline WBC count of 2.1 and the absolute neutrophil count is 1.2. I have explained to the patient that if the absolute neutrophil count continues to decline below 1.0 we may need to offer him treatment with either  Neupogen or Neulasta. Pt denies any fevers, unexplained infections. Thrombocytopenia: The current CBC shows that his preliminary  platelet counts are relatively stable at 114 ,000. Therapeutic intervention is not necessary unless the platelet counts decline below 30,000. History of prostate cancer: The patient is clinically stable. The patient continues to receive Eligard every 4 months. I will order a PSA level     History of bladder cancer:  Patient is s/p for a urological procedure on 12/2/2016 by Dr. Ebonie Babcock. He has routine follow-up appts with the urologist.    Arthritis: The patient is using Tramadol which provides adequate relief. This will continue. I will have the patient return in 3 months for a complete assessment. CBC will be checked every 3 weeks and Procrit will be provided if his hematocrit decline below 30%.   Orders Placed This Encounter    COMPLETE CBC & AUTO DIFF WBC    COMPLETE CBC & AUTO DIFF WBC    InHouse CBC ([employer])     Standing Status:   Future     Number of Occurrences:   1     Standing Expiration Date:   7/17/7885    METABOLIC PANEL, COMPREHENSIVE     Standing Status:   Future     Number of Occurrences:   1     Standing Expiration Date:   7/18/2018    PROSTATE SPECIFIC AG     Standing Status:   Future     Number of Occurrences:   1     Standing Expiration Date:   7/18/2018    IRON PROFILE     Standing Status:   Future     Number of Occurrences:   1     Standing Expiration Date:   7/18/2018    FERRITIN     Standing Status:   Future     Number of Occurrences:   1     Standing Expiration Date:   7/18/2018    VITAMIN D, 25 HYDROXY     Standing Status:   Future     Number of Occurrences:   1     Standing Expiration Date:   7/18/2017       Rosanna Hilliard MD  7/17/2017

## 2017-07-17 NOTE — PROGRESS NOTES
[facility] Progress Note    Date: 2017    Name: Chelsi Villa    MRN: [id number]         : 1932    PROCRIT INJECTION    Mr. Blake Bowman was assessed and education was provided. Mr. Nicki Mcclellan vitals were reviewed and patient was observed for 5 minutes prior to treatment. Visit Vitals    /56 (BP 1 Location: Right arm, BP Patient Position: Sitting)    Pulse (!) 58    Temp 97 °F (36.1 °C)    Resp 18    SpO2 96%     Lab results reviewed collected in Dr. Nitza Brewer office. Lab results were obtained and reviewed. Recent Results (from the past 12 hour(s))   CBC WITH 3 PART DIFF    Collection Time: 17  3:25 PM   Result Value Ref Range    WBC 2.1 (L) 4.5 - 13.0 K/uL    RBC 3.38 (L) 4.10 - 5.10 M/uL    HGB 8.5 (L) 12.0 - 16 g/dL    HCT 29.1 (L) 36 - 48 %    MCV 86.1 78 - 102 FL    MCH 25.1 25.0 - 35.0 PG    MCHC 29.2 (L) 31 - 37 g/dL    RDW 18.9 (H) 11.5 - 14.5 %    NEUTROPHILS 60 40 - 70 %    MIXED CELLS 7 0.1 - 17 %    LYMPHOCYTES 33 14 - 44 %    ABS. NEUTROPHILS 1.2 (L) 1.8 - 9.5 K/UL    ABS. MIXED CELLS 0.2 0.0 - 2.3 K/uL    ABS. LYMPHOCYTES 0.7 (L) 1.1 - 5.9 K/UL    DF AUTOMATED         Results within ordered parameters to administer procrit today. Procrit 60,000 units administered SQ in the back of the right arm. Band-aid applied. Mr. Blake Bowman tolerated well, and had no complaints. Patient armband removed and shredded. Mr. Blake Bowman was discharged from [place] [address] in stable condition at 1640. He is to return on 2017 at 0900 for his next appointment.     Jovan Edmonds RN  2017

## 2017-07-17 NOTE — PATIENT INSTRUCTIONS
Bladder Cancer: Care Instructions  Your Care Instructions  Bladder cancer occurs when abnormal cells grow out of control in the bladder. It usually can be cured when it is found early. It is more common in older people. Treatment may include surgery to remove part of the bladder. If the tumor is large, the entire bladder may be removed. You may also have radiation or chemotherapy to kill the cancer cells. Sometimes people get treatment with medicines that help the body's natural defenses, or immune system, fight the cancer. Finding out that you have cancer is scary. You may feel many emotions and may need some help coping. Seek out family, friends, and counselors for support. You also can do things at home to make yourself feel better while you go through treatment. Call the [employer] (2-461.358.4100) or visit its website at [employer] for more information. Follow-up care is a key part of your treatment and safety. Be sure to make and go to all appointments, and call your doctor if you are having problems. It's also a good idea to know your test results and keep a list of the medicines you take. How can you care for yourself at home? · Take your medicines exactly as prescribed. Call your doctor if you think you are having a problem with your medicine. You may get medicine for nausea and vomiting if you have these side effects. · Eat healthy food. If you are not hungry, try to eat food that has protein and extra calories to keep up your strength and prevent weight loss. Drink liquid meal replacements for extra calories and protein. Try to eat your main meal early. · Get some physical activity every day, but do not get too tired. Keep doing the hobbies you enjoy as your energy allows. · Take steps to control your stress and workload. Learn relaxation techniques. ¨ Share your feelings. Stress and tension affect our emotions.  By expressing your feelings to others, you may be able to understand and cope with them. ¨ Consider joining a support group. Talking about a problem with your spouse, a good friend, or other people with similar problems is a good way to reduce tension and stress. ¨ Express yourself through art. Try writing, dance, art, or crafts to relieve tension. Some dance, writing, or art groups may be available just for people who have cancer. ¨ Be kind to your body and mind. Getting enough sleep, eating a healthy diet, and taking time to do things you enjoy can contribute to an overall feeling of balance in your life and help reduce stress. ¨ Get help if you need it. Discuss your concerns with your doctor or counselor. · If you are vomiting or have diarrhea:  ¨ Drink plenty of fluids (enough so that your urine is light yellow or clear like water) to prevent dehydration. Choose water and other caffeine-free clear liquids. If you have kidney, heart, or liver disease and have to limit fluids, talk with your doctor before you increase the amount of fluids you drink. ¨ When you are able to eat, try clear soups, mild foods, and liquids until all symptoms are gone for 12 to 48 hours. Other good choices include dry toast, crackers, cooked cereal, and gelatin dessert, such as Jell-O.  · Take care of your urinary tract to prevent problems such as infection, which can be caused by bladder cancer and its treatment. Limit drinks with caffeine, drink plenty of fluids, and urinate every 3 to 4 hours. · If you have not already done so, prepare a list of advance directives. Advance directives are instructions to your doctor and family members about what kind of care you want if you become unable to speak for yourself. When should you call for help? Call 911 anytime you think you may need emergency care. For example, call if:  · You passed out (lost consciousness). · You have severe belly pain.   Call your doctor now or seek immediate medical care if:  · Vomiting lasts longer than 24 hours and you are not able to keep down fluids. · You have symptoms of a urinary infection. For example:  ¨ You have blood or pus in your urine. ¨ You have pain in your back just below your rib cage. This is called flank pain. ¨ You have a fever, chills, or body aches. ¨ It hurts to urinate. ¨ You have groin or belly pain. Watch closely for changes in your health, and be sure to contact your doctor if:  · You are not able to eat well and are losing weight. · You feel more tired than usual.  Where can you learn more? Go to http://[employer].info/. Enter M796 in the search box to learn more about \"Bladder Cancer: Care Instructions. \"  Current as of: July 26, 2016  Content Version: 11.3  © 3498-4452 [employer]. Care instructions adapted under license by [employer] (which disclaims liability or warranty for this information). If you have questions about a medical condition or this instruction, always ask your healthcare professional. [place] [address] any warranty or liability for your use of this information. Anemia From Chronic Disease: Care Instructions  Your Care Instructions    Anemia is a low level of red blood cells, which carry oxygen from your lungs to the rest of your body. Sometimes when you have a long-term (chronic) disease such as kidney disease, arthritis, diabetes, cancer, or an infection, your body does not make enough red blood cells. Follow-up care is a key part of your treatment and safety. Be sure to make and go to all appointments, and call your doctor if you are having problems. It's also a good idea to know your test results and keep a list of the medicines you take. How can you care for yourself at home? · Follow your doctor's instructions to treat the chronic condition that is causing the anemia. · Take your medicine to treat your chronic condition exactly as prescribed.  Call your doctor if you think you are having a problem with your medicine. · Take your medicine for anemia exactly as prescribed. Call your doctor if you think you are having a problem with your medicine. Medicines to increase the number of red blood cells (such as epoetin or darbepoetin) may be given as an injection. ¨ If you miss a dose, take it as soon as you can, unless it is almost time for your next dose. In that case, get back on your regular schedule and take only one dose. ¨ Do not freeze this medicine. Store it in the refrigerator. Do not shake the bottle before you prepare the shot. · Keep all your appointments for blood tests to check on your hemoglobin levels. When should you call for help? Call 911 anytime you think you may need emergency care. For example, call if:  · You passed out (lost consciousness). · You have symptoms of a heart attack, such as:  ¨ Chest pain or pressure. ¨ Sweating. ¨ Shortness of breath. ¨ Nausea or vomiting. ¨ Pain that spreads from the chest to the neck, jaw, or one or both shoulders or arms. ¨ Dizziness or lightheadedness. ¨ A fast or uneven pulse. After calling 911, chew 1 adult-strength aspirin. Wait for an ambulance. Do not try to drive yourself. · You have a seizure. Call your doctor now or seek immediate medical care if:  · You have side effects of epoetin and darbepoetin, such as:  ¨ A headache. ¨ A fever. ¨ Diarrhea, nausea, or vomiting. ¨ Fatigue. ¨ Muscle or joint pain. ¨ A skin rash. · Your fatigue and weakness continue or get worse. Watch closely for changes in your health, and be sure to contact your doctor if you have any problems. Where can you learn more? Go to http://glenna-toby.info/. Enter E502 in the search box to learn more about \"Anemia From Chronic Disease: Care Instructions. \"  Current as of: October 13, 2016  Content Version: 11.3  © 3886-4624 [employer].  Care instructions adapted under license by [employer] (which disclaims liability or warranty for this information). If you have questions about a medical condition or this instruction, always ask your healthcare professional. [place] [address] any warranty or liability for your use of this information.

## 2017-07-17 NOTE — MR AVS SNAPSHOT
Visit Information Date & Time Provider Department Dept. Phone Encounter #  
 7/17/2017  3:00 PM Andrae Aviles MD [address] Oncology [phone] [id number] Follow-up Instructions Return in about 3 months (around 10/17/2017). Your Appointments 8/3/2017 10:45 AM  
PROCEDURE with Morales Tracey MD  
[facility] Appt Note: 4mo [address] 127 [facility] [address] [address]  
  
    
 9/6/2017 11:00 AM  
PROCEDURE with Morales Tracey MD  
[facility]) Appt Note: Cysto Per Dr. Glenis Baker 127 [facility] 200 [facility]  
[phone]  
  
    
 10/16/2017  2:15 PM  
Office Visit with Andrae Aviles MD  
[address] Oncology [facility]) Appt Note: 3 MO RET  
 [address], [address] [place] [address] Upcoming Health Maintenance Date Due  
 FOOT EXAM Q1 11/14/1942 EYE EXAM RETINAL OR DILATED Q1 11/14/1942 DTaP/Tdap/Td series (1 - Tdap) 11/14/1953 ZOSTER VACCINE AGE 60> 11/14/1992 GLAUCOMA SCREENING Q2Y 11/14/1997 MEDICARE YEARLY EXAM 11/14/1997 HEMOGLOBIN A1C Q6M 7/4/2010 MICROALBUMIN Q1 1/4/2011 LIPID PANEL Q1 1/4/2011 Pneumococcal 65+ High/Highest Risk (2 of 2 - PPSV23) 5/29/2017 INFLUENZA AGE 9 TO ADULT 8/1/2017 Allergies as of 7/17/2017  Review Complete On: 7/17/2017 By: Andrae Aviles MD  
  
 Severity Noted Reaction Type Reactions Allopurinol  05/15/2014    Unknown (comments) Current Immunizations  Reviewed on 7/17/2017 No immunizations on file. Reviewed by Milo Webber RN on 7/17/2017 at  3:18 PM  
You Were Diagnosed With   
  
 Codes Comments Myelodysplastic syndrome ([facility] [address]    -  Primary ICD-10-CM: D46.9 ICD-9-CM: 238.75 Prostate cancer [facility])     ICD-10-CM: J66 ICD-9-CM: 052 Malignant neoplasm of trigone of urinary bladder (HCC)     ICD-10-CM: C67.0 ICD-9-CM: 188. 0 Vitamin D deficiency     ICD-10-CM: E55.9 ICD-9-CM: 268.9 Thrombocytopenia ([address]     ICD-10-CM: D69.6 ICD-9-CM: 287.5 Anemia due to chronic kidney disease     ICD-10-CM: N18.9, D63.1 ICD-9-CM: 285.21 Vitals BP Pulse Temp Height(growth percentile) Weight(growth percentile) SpO2  
 119/55 (!) 56 97 °F (36.1 °C) 6' (1.829 m) 283 lb 6.4 oz (128.5 kg) 95% BMI Smoking Status 38.44 kg/m2 Former Smoker Vitals History BMI and BSA Data Body Mass Index Body Surface Area  
 38.44 kg/m 2 2.55 m 2 Preferred Pharmacy Pharmacy Name Phone [facility] [address] [place] [address] [phone] Your Updated Medication List  
  
   
This list is accurate as of: 7/17/17  4:03 PM.  Always use your most recent med list.  
  
  
  
  
 allopurinol 100 mg tablet Commonly known as:  Domenica Hailey Take  by mouth daily. amLODIPine 5 mg tablet Commonly known as:  Erenest Delfino Take 5 mg by mouth daily. aspirin delayed-release 81 mg tablet Take  by mouth daily. carvedilol 25 mg tablet Commonly known as:  Aiken Edvin Take 25 mg by mouth two (2) times daily (with meals). Cholecalciferol (Vitamin D3) 3,000 unit Tab Take 6,000 Units by mouth. furosemide 40 mg tablet Commonly known as:  LASIX Take  by mouth daily. gabapentin 300 mg capsule Commonly known as:  NEURONTIN Take 300 mg by mouth three (3) times daily. isosorbide mononitrate ER 30 mg tablet Commonly known as:  IMDUR Take  by mouth daily. LANTUS 100 unit/mL injection Generic drug:  insulin glargine  
by SubCUTAneous route nightly. NITROSTAT 0.4 mg SL tablet Generic drug:  nitroglycerin by SubLINGual route every five (5) minutes as needed. pioglitazone 15 mg tablet Commonly known as:  ACTOS  
  
 simvastatin 40 mg tablet Commonly known as:  ZOCOR Take  by mouth nightly. traMADol 50 mg tablet Commonly known as:  ULTRAM  
Take 50 mg by mouth every six (6) hours as needed for Pain. TRUETEST TEST STRIPS strip Generic drug:  glucose blood VI test strips We Performed the Following COMPLETE CBC & AUTO DIFF WBC [81030 CPT(R)] COMPLETE CBC & AUTO DIFF WBC [64059 CPT(R)] Follow-up Instructions Return in about 3 months (around 10/17/2017). To-Do List   
 07/17/2017 Lab:  CBC WITH 3 PART DIFF   
  
 07/17/2017 Lab:  PROSTATE SPECIFIC AG (PSA)   
  
 07/17/2017 Lab:  VITAMIN D, 25 HYDROXY   
  
 07/17/2017  4:40 PM  
  [facility] with [facility] ([phone])  
  
 07/18/2017 Lab:  FERRITIN   
  
 07/18/2017 Lab:  IRON PROFILE   
  
 07/18/2017 Lab:  METABOLIC PANEL, COMPREHENSIVE   
  
 08/11/2017 2:00 PM  
  Appointment with [facility] FA[facility] TRACK NURSE at [facility] ([phone]) Patient Instructions Bladder Cancer: Care Instructions Your Care Instructions Bladder cancer occurs when abnormal cells grow out of control in the bladder. It usually can be cured when it is found early. It is more common in older people. Treatment may include surgery to remove part of the bladder. If the tumor is large, the entire bladder may be removed. You may also have radiation or chemotherapy to kill the cancer cells. Sometimes people get treatment with medicines that help the body's natural defenses, or immune system, fight the cancer. Finding out that you have cancer is scary. You may feel many emotions and may need some help coping. Seek out family, friends, and counselors for support.  You also can do things at home to make yourself feel better while you go through treatment. Call the [facility] (6-462.197.5637) or visit its website at [employer] [employer]. [employer] for more information. Follow-up care is a key part of your treatment and safety. Be sure to make and go to all appointments, and call your doctor if you are having problems. It's also a good idea to know your test results and keep a list of the medicines you take. How can you care for yourself at home? · Take your medicines exactly as prescribed. Call your doctor if you think you are having a problem with your medicine. You may get medicine for nausea and vomiting if you have these side effects. · Eat healthy food. If you are not hungry, try to eat food that has protein and extra calories to keep up your strength and prevent weight loss. Drink liquid meal replacements for extra calories and protein. Try to eat your main meal early. · Get some physical activity every day, but do not get too tired. Keep doing the hobbies you enjoy as your energy allows. · Take steps to control your stress and workload. Learn relaxation techniques. ¨ Share your feelings. Stress and tension affect our emotions. By expressing your feelings to others, you may be able to understand and cope with them. ¨ Consider joining a support group. Talking about a problem with your spouse, a good friend, or other people with similar problems is a good way to reduce tension and stress. ¨ Express yourself through art. Try writing, dance, art, or crafts to relieve tension. Some dance, writing, or art groups may be available just for people who have cancer. ¨ Be kind to your body and mind. Getting enough sleep, eating a healthy diet, and taking time to do things you enjoy can contribute to an overall feeling of balance in your life and help reduce stress. ¨ Get help if you need it. Discuss your concerns with your doctor or counselor. · If you are vomiting or have diarrhea: ¨ Drink plenty of fluids (enough so that your urine is light yellow or clear like water) to prevent dehydration. Choose water and other caffeine-free clear liquids. If you have kidney, heart, or liver disease and have to limit fluids, talk with your doctor before you increase the amount of fluids you drink. ¨ When you are able to eat, try clear soups, mild foods, and liquids until all symptoms are gone for 12 to 48 hours. Other good choices include dry toast, crackers, cooked cereal, and gelatin dessert, such as Jell-O. 
· Take care of your urinary tract to prevent problems such as infection, which can be caused by bladder cancer and its treatment. Limit drinks with caffeine, drink plenty of fluids, and urinate every 3 to 4 hours. · If you have not already done so, prepare a list of advance directives. Advance directives are instructions to your doctor and family members about what kind of care you want if you become unable to speak for yourself. When should you call for help? Call 911 anytime you think you may need emergency care. For example, call if: 
· You passed out (lost consciousness). · You have severe belly pain. Call your doctor now or seek immediate medical care if: · Vomiting lasts longer than 24 hours and you are not able to keep down fluids. · You have symptoms of a urinary infection. For example: ¨ You have blood or pus in your urine. ¨ You have pain in your back just below your rib cage. This is called flank pain. ¨ You have a fever, chills, or body aches. ¨ It hurts to urinate. ¨ You have groin or belly pain. Watch closely for changes in your health, and be sure to contact your doctor if: 
· You are not able to eat well and are losing weight. · You feel more tired than usual. 
Where can you learn more? Go to http://glenna-toby.info/. Enter M796 in the search box to learn more about \"Bladder Cancer: Care Instructions. \" Current as of: July 26, 2016 Content Version: 11.3 © 1588-4927 [employer]. Care instructions adapted under license by [employer] (which disclaims liability or warranty for this information). If you have questions about a medical condition or this instruction, always ask your healthcare professional. [address] any warranty or liability for your use of this information. Anemia From Chronic Disease: Care Instructions Your Care Instructions Anemia is a low level of red blood cells, which carry oxygen from your lungs to the rest of your body. Sometimes when you have a long-term (chronic) disease such as kidney disease, arthritis, diabetes, cancer, or an infection, your body does not make enough red blood cells. Follow-up care is a key part of your treatment and safety. Be sure to make and go to all appointments, and call your doctor if you are having problems. It's also a good idea to know your test results and keep a list of the medicines you take. How can you care for yourself at home? · Follow your doctor's instructions to treat the chronic condition that is causing the anemia. · Take your medicine to treat your chronic condition exactly as prescribed. Call your doctor if you think you are having a problem with your medicine. · Take your medicine for anemia exactly as prescribed. Call your doctor if you think you are having a problem with your medicine. Medicines to increase the number of red blood cells (such as epoetin or darbepoetin) may be given as an injection. ¨ If you miss a dose, take it as soon as you can, unless it is almost time for your next dose. In that case, get back on your regular schedule and take only one dose. ¨ Do not freeze this medicine. Store it in the refrigerator. Do not shake the bottle before you prepare the shot. · Keep all your appointments for blood tests to check on your hemoglobin levels. When should you call for help? Call 911 anytime you think you may need emergency care. For example, call if: 
· You passed out (lost consciousness). · You have symptoms of a heart attack, such as: ¨ Chest pain or pressure. ¨ Sweating. ¨ Shortness of breath. ¨ Nausea or vomiting. ¨ Pain that spreads from the chest to the neck, jaw, or one or both shoulders or arms. ¨ Dizziness or lightheadedness. ¨ A fast or uneven pulse. After calling 911, chew 1 adult-strength aspirin. Wait for an ambulance. Do not try to drive yourself. · You have a seizure. Call your doctor now or seek immediate medical care if: 
· You have side effects of epoetin and darbepoetin, such as: ¨ A headache. ¨ A fever. ¨ Diarrhea, nausea, or vomiting. ¨ Fatigue. ¨ Muscle or joint pain. ¨ A skin rash. · Your fatigue and weakness continue or get worse. Watch closely for changes in your health, and be sure to contact your doctor if you have any problems. Where can you learn more? Go to http://glenna-toby.info/. Enter E502 in the search box to learn more about \"Anemia From Chronic Disease: Care Instructions. \" Current as of: October 13, 2016 Content Version: 11.3 © 3965-8212 [employer] Care instructions adapted under license by [employer] (which disclaims liability or warranty for this information). If you have questions about a medical condition or this instruction, always ask your healthcare professional. [place] [address] any warranty or liability for your use of this information. Introducing [facility]! Chrissy Fernández introduces [employer] patient portal. Now you can access parts of your medical record, email your doctor's office, and request medication refills online. 1. In your internet browser, go to [email or web address] 2. Click on the First Time User? Click Here link in the Sign In box. You will see the New Member Sign Up page. 3. Enter your [email or web address] Access Code exactly as it appears below. You will not need to use this code after youve completed the sign-up process. If you do not sign up before the expiration date, you must request a new code. · [email or web address] Access Code: [email or web address] Expires: 9/24/2017 11:02 AM 
 
4. Enter the last four digits of your Social Security Number (xxxx) and Date of Birth (mm/dd/yyyy) as indicated and click Submit. You will be taken to the next sign-up page. 5. Create a Manzuo.[employer] ID. This will be your [email or web address] login ID and cannot be changed, so think of one that is secure and easy to remember. 6. Create a [email or web address] password. You can change your password at any time. 7. Enter your Password Reset Question and Answer. This can be used at a later time if you forget your password. 8. Enter your e-mail address. You will receive e-mail notification when new information is available in [address] 9. Click Sign Up. You can now view and download portions of your medical record. 10. Click the Download Summary menu link to download a portable copy of your medical information. If you have questions, please visit the Frequently Asked Questions section of the [email or web address] website. Remember, [email or web address] is NOT to be used for urgent needs. For medical emergencies, dial 911. Now available from your iPhone and Android! Please provide this summary of care documentation to your next provider. Your primary care clinician is listed as Tamir Estrada. If you have any questions after today's visit, please call [phone].

## 2017-07-18 LAB
25(OH)D3+25(OH)D2 SERPL-MCNC: 32 NG/ML (ref 30–100)
ALBUMIN SERPL-MCNC: 3.8 G/DL (ref 3.5–4.7)
ALBUMIN/GLOB SERPL: 1.5 {RATIO} (ref 1.2–2.2)
ALP SERPL-CCNC: 51 IU/L (ref 39–117)
ALT SERPL-CCNC: 7 IU/L (ref 0–44)
AST SERPL-CCNC: 18 IU/L (ref 0–40)
BILIRUB SERPL-MCNC: 0.6 MG/DL (ref 0–1.2)
BUN SERPL-MCNC: 25 MG/DL (ref 8–27)
BUN/CREAT SERPL: 22 (ref 10–24)
CALCIUM SERPL-MCNC: 8.5 MG/DL (ref 8.6–10.2)
CHLORIDE SERPL-SCNC: 98 MMOL/L (ref 96–106)
CO2 SERPL-SCNC: 30 MMOL/L (ref 18–29)
CREAT SERPL-MCNC: 1.15 MG/DL (ref 0.76–1.27)
FERRITIN SERPL-MCNC: 310 NG/ML (ref 30–400)
GLOBULIN SER CALC-MCNC: 2.6 G/DL (ref 1.5–4.5)
GLUCOSE SERPL-MCNC: 199 MG/DL (ref 65–99)
IRON SATN MFR SERPL: 35 % (ref 15–55)
IRON SERPL-MCNC: 91 UG/DL (ref 38–169)
POTASSIUM SERPL-SCNC: 4.1 MMOL/L (ref 3.5–5.2)
PROT SERPL-MCNC: 6.4 G/DL (ref 6–8.5)
PSA SERPL-MCNC: 4.5 NG/ML (ref 0–4)
SODIUM SERPL-SCNC: 142 MMOL/L (ref 134–144)
TIBC SERPL-MCNC: 259 UG/DL (ref 250–450)
UIBC SERPL-MCNC: 168 UG/DL (ref 111–343)

## 2017-07-18 NOTE — PROGRESS NOTES
The patient continues to receives eliguard for his prostate cancer. Will continue to monitor the PSA level.

## 2017-07-28 ENCOUNTER — APPOINTMENT (OUTPATIENT)
Dept: INFUSION THERAPY | Age: 82
End: 2017-07-28
Payer: MEDICARE

## 2017-08-11 ENCOUNTER — HOSPITAL ENCOUNTER (OUTPATIENT)
Dept: INFUSION THERAPY | Age: 82
Discharge: HOME | End: 2017-08-11
Payer: MEDICARE

## 2017-08-11 VITALS
DIASTOLIC BLOOD PRESSURE: 58 MMHG | OXYGEN SATURATION: 95 % | RESPIRATION RATE: 18 BRPM | TEMPERATURE: 98.5 F | HEART RATE: 66 BPM | SYSTOLIC BLOOD PRESSURE: 140 MMHG

## 2017-08-11 LAB
BASO+EOS+MONOS # BLD AUTO: 0.2 K/UL (ref 0–2.3)
BASO+EOS+MONOS # BLD AUTO: 11 % (ref 0.1–17)
DIFFERENTIAL METHOD BLD: ABNORMAL
ERYTHROCYTE [DISTWIDTH] IN BLOOD BY AUTOMATED COUNT: 19.6 % (ref 11.5–14.5)
HCT VFR BLD AUTO: 29.9 % (ref 36–48)
HGB BLD-MCNC: 8.9 G/DL (ref 12–16)
LYMPHOCYTES # BLD AUTO: 35 % (ref 14–44)
LYMPHOCYTES # BLD: 0.8 K/UL (ref 1.1–5.9)
MCH RBC QN AUTO: 25.5 PG (ref 25–35)
MCHC RBC AUTO-ENTMCNC: 29.8 G/DL (ref 31–37)
MCV RBC AUTO: 85.7 FL (ref 78–102)
NEUTS SEG # BLD: 1.2 K/UL (ref 1.8–9.5)
NEUTS SEG NFR BLD AUTO: 54 % (ref 40–70)
PLATELET # BLD AUTO: 123 K/UL (ref 135–420)
RBC # BLD AUTO: 3.49 M/UL (ref 4.1–5.1)
WBC # BLD AUTO: 2.2 K/UL (ref 4.5–13)

## 2017-08-11 PROCEDURE — 36415 COLL VENOUS BLD VENIPUNCTURE: CPT

## 2017-08-11 PROCEDURE — 74011250636 HC RX REV CODE- 250/636: Performed by: NURSE PRACTITIONER

## 2017-08-11 PROCEDURE — 85025 COMPLETE CBC W/AUTO DIFF WBC: CPT | Performed by: INTERNAL MEDICINE

## 2017-08-11 PROCEDURE — 85049 AUTOMATED PLATELET COUNT: CPT | Performed by: INTERNAL MEDICINE

## 2017-08-11 PROCEDURE — 96372 THER/PROPH/DIAG INJ SC/IM: CPT

## 2017-08-11 RX ADMIN — ERYTHROPOIETIN 40000 UNITS: 40000 INJECTION, SOLUTION INTRAVENOUS; SUBCUTANEOUS at 09:34

## 2017-08-11 RX ADMIN — ERYTHROPOIETIN 20000 UNITS: 20000 INJECTION, SOLUTION INTRAVENOUS; SUBCUTANEOUS at 09:35

## 2017-08-11 NOTE — PROGRESS NOTES
[facility] Progress Note    Date: 2017    Name: Selvin Hamilton. MRN: [id number]         : 1932    PROCRIT INJECTION    Mr. Suzie Alvarado was assessed and education was provided. Mr. Jamarcus Srpinger vitals were reviewed and patient was observed for 5 minutes prior to treatment. Visit Vitals    /58 (BP 1 Location: Left arm, BP Patient Position: Sitting)    Pulse 66    Temp 98.5 °F (36.9 °C)    Resp 18    SpO2 95%         Lab results were obtained and reviewed. Recent Results (from the past 12 hour(s))   CBC WITH 3 PART DIFF    Collection Time: 17  9:15 AM   Result Value Ref Range    WBC 2.2 (L) 4.5 - 13.0 K/uL    RBC 3.49 (L) 4.10 - 5.10 M/uL    HGB 8.9 (L) 12.0 - 16 g/dL    HCT 29.9 (L) 36 - 48 %    MCV 85.7 78 - 102 FL    MCH 25.5 25.0 - 35.0 PG    MCHC 29.8 (L) 31 - 37 g/dL    RDW 19.6 (H) 11.5 - 14.5 %    NEUTROPHILS 54 40 - 70 %    MIXED CELLS 11 0.1 - 17 %    LYMPHOCYTES 35 14 - 44 %    ABS. NEUTROPHILS 1.2 (L) 1.8 - 9.5 K/UL    ABS. MIXED CELLS 0.2 0.0 - 2.3 K/uL    ABS. LYMPHOCYTES 0.8 (L) 1.1 - 5.9 K/UL    DF AUTOMATED         Results within ordered parameters to administer procrit today. Procrit 60,000 units administered SQ in the back of the left arm. Band-aid applied. Mr. Suzie Alvarado tolerated well, and had no complaints. Patient armband removed and shredded. Mr. Suzie Alvarado was discharged from [place] [address] in stable condition at [address]. He is to return on 2017 at 1430 for his next appointment, his daughter called and made aware.     Isac Wilcox  2017

## 2017-09-01 ENCOUNTER — HOSPITAL ENCOUNTER (OUTPATIENT)
Dept: INFUSION THERAPY | Age: 82
Discharge: HOME | End: 2017-09-01
Payer: MEDICARE

## 2017-09-01 VITALS
RESPIRATION RATE: 18 BRPM | OXYGEN SATURATION: 96 % | TEMPERATURE: 97.6 F | DIASTOLIC BLOOD PRESSURE: 55 MMHG | SYSTOLIC BLOOD PRESSURE: 115 MMHG | HEART RATE: 57 BPM

## 2017-09-01 LAB
BASO+EOS+MONOS # BLD AUTO: 0.1 K/UL (ref 0–2.3)
BASO+EOS+MONOS # BLD AUTO: 7 % (ref 0.1–17)
DIFFERENTIAL METHOD BLD: ABNORMAL
ERYTHROCYTE [DISTWIDTH] IN BLOOD BY AUTOMATED COUNT: 20.3 % (ref 11.5–14.5)
HCT VFR BLD AUTO: 28.6 % (ref 36–48)
HGB BLD-MCNC: 8.3 G/DL (ref 12–16)
LYMPHOCYTES # BLD: 1 K/UL (ref 1.1–5.9)
LYMPHOCYTES NFR BLD: 44 % (ref 14–44)
MCH RBC QN AUTO: 24.9 PG (ref 25–35)
MCHC RBC AUTO-ENTMCNC: 29 G/DL (ref 31–37)
MCV RBC AUTO: 85.9 FL (ref 78–102)
NEUTS SEG # BLD: 1.1 K/UL (ref 1.8–9.5)
NEUTS SEG NFR BLD: 49 % (ref 40–70)
PLATELET # BLD AUTO: 118 K/UL (ref 135–420)
RBC # BLD AUTO: 3.33 M/UL (ref 4.1–5.1)
WBC # BLD AUTO: 2.2 K/UL (ref 4.5–13)

## 2017-09-01 PROCEDURE — 74011250636 HC RX REV CODE- 250/636: Performed by: NURSE PRACTITIONER

## 2017-09-01 PROCEDURE — 36415 COLL VENOUS BLD VENIPUNCTURE: CPT

## 2017-09-01 PROCEDURE — 85025 COMPLETE CBC W/AUTO DIFF WBC: CPT | Performed by: INTERNAL MEDICINE

## 2017-09-01 PROCEDURE — 96372 THER/PROPH/DIAG INJ SC/IM: CPT

## 2017-09-01 PROCEDURE — 85049 AUTOMATED PLATELET COUNT: CPT | Performed by: INTERNAL MEDICINE

## 2017-09-01 RX ADMIN — ERYTHROPOIETIN 40000 UNITS: 40000 INJECTION, SOLUTION INTRAVENOUS; SUBCUTANEOUS at 10:41

## 2017-09-01 RX ADMIN — ERYTHROPOIETIN 20000 UNITS: 20000 INJECTION, SOLUTION INTRAVENOUS; SUBCUTANEOUS at 10:41

## 2017-09-01 NOTE — PROGRESS NOTES
[facility] Progress Note    Date: 2017    Name: Lakshmi Presley MRN: [id number]         : 1932    PROCRIT INJECTION    Mr. Narinder Blackburn was assessed and education was provided. Mr. Christine Noonan vitals were reviewed and patient was observed for 5 minutes prior to treatment. Visit Vitals    /55 (BP 1 Location: Left arm, BP Patient Position: Sitting)    Pulse (!) 57    Temp 97.6 °F (36.4 °C)    Resp 18    SpO2 96%     Blood drawn from left arm x1 attempt for CBC per written order. Gauze and tape applied to the site. Lab results were obtained and reviewed. Recent Results (from the past 12 hour(s))   CBC WITH 3 PART DIFF    Collection Time: 17 10:29 AM   Result Value Ref Range    WBC 2.2 (L) 4.5 - 13.0 K/uL    RBC 3.33 (L) 4.10 - 5.10 M/uL    HGB 8.3 (L) 12.0 - 16 g/dL    HCT 28.6 (L) 36 - 48 %    MCV 85.9 78 - 102 FL    MCH 24.9 (L) 25.0 - 35.0 PG    MCHC 29.0 (L) 31 - 37 g/dL    RDW 20.3 (H) 11.5 - 14.5 %    NEUTROPHILS 49 40 - 70 %    MIXED CELLS 7 0.1 - 17 %    LYMPHOCYTES 44 14 - 44 %    ABS. NEUTROPHILS 1.1 (L) 1.8 - 9.5 K/UL    ABS. MIXED CELLS 0.1 0.0 - 2.3 K/uL    ABS. LYMPHOCYTES 1.0 (L) 1.1 - 5.9 K/UL    DF AUTOMATED       HGB= 8.3 and HCT= 28.6    Results within ordered parameters to administer procrit today. Procrit 60,000 units administered SQ in the back of the left arm. Band-aid applied. Mr. Narinder Blackburn tolerated well, and had no complaints. Patient armband removed and shredded. Mr. Narinder Blackburn was discharged from [place] [address] in stable condition at 1045. He is to return on 2017 at 1430 for his next appointment.     Daksha Pena RN  2017

## 2017-10-16 ENCOUNTER — HOSPITAL ENCOUNTER (OUTPATIENT)
Dept: INFUSION THERAPY | Age: 82
End: 2017-10-16

## 2017-11-06 ENCOUNTER — OFFICE VISIT (OUTPATIENT)
Dept: ONCOLOGY | Age: 82
End: 2017-11-06

## 2017-11-06 ENCOUNTER — HOSPITAL ENCOUNTER (OUTPATIENT)
Dept: ONCOLOGY | Age: 82
Discharge: HOME | End: 2017-11-06

## 2017-11-06 VITALS
BODY MASS INDEX: 39.6 KG/M2 | TEMPERATURE: 97.5 F | WEIGHT: 292 LBS | DIASTOLIC BLOOD PRESSURE: 52 MMHG | SYSTOLIC BLOOD PRESSURE: 128 MMHG | HEART RATE: 59 BPM

## 2017-11-06 DIAGNOSIS — D46.9 MYELODYSPLASTIC SYNDROME (HCC): Primary | ICD-10-CM

## 2017-11-06 DIAGNOSIS — C61 PROSTATE CANCER (HCC): ICD-10-CM

## 2017-11-06 DIAGNOSIS — E55.9 VITAMIN D DEFICIENCY: ICD-10-CM

## 2017-11-06 DIAGNOSIS — D46.9 MYELODYSPLASTIC SYNDROME (HCC): ICD-10-CM

## 2017-11-06 LAB
BASO+EOS+MONOS # BLD AUTO: 0.2 K/UL (ref 0–2.3)
BASO+EOS+MONOS # BLD AUTO: 8 % (ref 0.1–17)
DIFFERENTIAL METHOD BLD: ABNORMAL
ERYTHROCYTE [DISTWIDTH] IN BLOOD BY AUTOMATED COUNT: 19.6 % (ref 11.5–14.5)
HCT VFR BLD AUTO: 31.1 % (ref 36–48)
HGB BLD-MCNC: 8.9 G/DL (ref 12–16)
LYMPHOCYTES # BLD: 0.8 K/UL (ref 1.1–5.9)
LYMPHOCYTES NFR BLD: 42 % (ref 14–44)
MCH RBC QN AUTO: 25.2 PG (ref 25–35)
MCHC RBC AUTO-ENTMCNC: 28.6 G/DL (ref 31–37)
MCV RBC AUTO: 88.1 FL (ref 78–102)
NEUTS SEG # BLD: 0.9 K/UL (ref 1.8–9.5)
NEUTS SEG NFR BLD: 50 % (ref 40–70)
PLATELET # BLD AUTO: 119 K/UL (ref 135–420)
RBC # BLD AUTO: 3.53 M/UL (ref 4.1–5.1)
WBC # BLD AUTO: 1.9 K/UL (ref 4.5–13)

## 2017-11-06 NOTE — MR AVS SNAPSHOT
Visit Information Date & Time Provider Department Dept. Phone Encounter #  
 11/6/2017  1:45 PM Andrew Berman MD [facility] [phone] [id number] Follow-up Instructions Return in about 3 months (around 2/6/2018). Your Appointments 11/6/2017  1:45 PM  
Office Visit with Andrew Berman MD  
[facility] Appt Note: 3 MO RET; 3 MO RET; $45 copay, Bal of $45  
 [address] [facility] [address] [facility], [facility] 130 [facility]  
  
    
 11/27/2017  9:45 AM  
ESTABLISHED PATIENT with Dinora Jerez MD  
[facility]) Appt Note: 4mo Eligard [address] [facility] [address] [address] [address]  
  
    
 2/27/2018 10:30 AM  
PROCEDURE with Dinora Jerez MD  
[facility]) Appt Note: 4 MTHS CYSTO PSA PRIOR  
 [address] [facility] [address] Upcoming Health Maintenance Date Due  
 FOOT EXAM Q1 11/14/1942 EYE EXAM RETINAL OR DILATED Q1 11/14/1942 DTaP/Tdap/Td series (1 - Tdap) 11/14/1953 ZOSTER VACCINE AGE 60> 9/14/1992 GLAUCOMA SCREENING Q2Y 11/14/1997 MEDICARE YEARLY EXAM 11/14/1997 HEMOGLOBIN A1C Q6M 7/4/2010 MICROALBUMIN Q1 1/4/2011 LIPID PANEL Q1 1/4/2011 Pneumococcal 65+ High/Highest Risk (2 of 2 - PPSV23) 5/29/2017 INFLUENZA AGE 9 TO ADULT 8/1/2017 Allergies as of 11/6/2017  Review Complete On: 10/26/2017 By: Jeane Farah Severity Noted Reaction Type Reactions Allopurinol  05/15/2014    Unknown (comments) Current Immunizations  Reviewed on 9/1/2017 No immunizations on file. Not reviewed this visit You Were Diagnosed With   
  
 Codes Comments Myelodysplastic syndrome ([facility] [address]    -  Primary ICD-10-CM: D46.9 ICD-9-CM: 238.75 Prostate cancer [facility])     ICD-10-CM: C95 ICD-9-CM: 164 Vitamin D deficiency     ICD-10-CM: E55.9 ICD-9-CM: 268.9 Vitals BP Pulse Temp Weight(growth percentile) BMI Smoking Status 128/52 (BP 1 Location: Right arm, BP Patient Position: Sitting) (!) 59 97.5 °F (36.4 °C) (Oral) 292 lb (132.5 kg) 39.6 kg/m2 Former Smoker BMI and BSA Data Body Mass Index Body Surface Area  
 39.6 kg/m 2 2.59 m 2 Preferred Pharmacy Pharmacy Name Phone [facility] [address] [place] [address], [address] [place] [address] Rd [phone] Your Updated Medication List  
  
   
This list is accurate as of: 11/6/17 12:31 PM.  Always use your most recent med list. amLODIPine 5 mg tablet Commonly known as:  [place] Take 5 mg by mouth daily. aspirin delayed-release 81 mg tablet Take  by mouth daily. Cholecalciferol (Vitamin D3) 3,000 unit Tab Take 6,000 Units by mouth.  
  
 gabapentin 300 mg capsule Commonly known as:  NEURONTIN Take 300 mg by mouth three (3) times daily. isosorbide mononitrate ER 30 mg tablet Commonly known as:  IMDUR Take  by mouth daily. LANTUS 100 unit/mL injection Generic drug:  insulin glargine  
by SubCUTAneous route nightly. NITROSTAT 0.4 mg SL tablet Generic drug:  nitroglycerin  
by SubLINGual route every five (5) minutes as needed. pioglitazone 15 mg tablet Commonly known as:  ACTOS  
  
 simvastatin 40 mg tablet Commonly known as:  ZOCOR Take  by mouth nightly. traMADol 50 mg tablet Commonly known as:  ULTRAM  
Take 50 mg by mouth every six (6) hours as needed for Pain. TRUETEST TEST STRIPS strip Generic drug:  glucose blood VI test strips We Performed the Following COMPLETE CBC & AUTO DIFF WBC [20731 CPT(R)] FERRITIN [37740 CPT(R)] IRON PROFILE [id number] CPT(R)] METABOLIC PANEL, COMPREHENSIVE [43001 CPT(R)] PSA, DIAGNOSTIC (PROSTATE SPECIFIC AG) [id number] CPT(R)] VITAMIN D, 25 HYDROXY [id number] CPT(R)] Follow-up Instructions Return in about 3 months (around 2/6/2018). To-Do List   
 11/06/2017 Lab:  CBC WITH 3 PART DIFF   
  
 11/06/2017  2:30 PM  
  Appointment with [facility] FAST TRACK NURSE at [facility] ([phone]) Patient Instructions Myelodysplastic Syndromes: Care Instructions Your Care Instructions Myelodysplastic syndromes, also called MDS, are a group of rare conditions in which the bone marrow does not make enough healthy blood cells. Normally, the bone marrow makes red blood cells, white blood cells, and platelets. These cells carry oxygen in the blood, help the body fight infections, and help the blood clot. With MDS, you may feel weak and tired, get infections often, and bruise easily, although symptoms tend to vary. MDS is a form of blood cancer. In some cases, MDS can turn into acute myeloid leukemia (AML), another type of cancer. Some people develop MDS after treatment for cancer or exposure to pesticides or other chemicals. But in most cases, the cause of MDS is not known. Your doctor will use the results of blood tests to guide your treatment. There are many types of MDS, with different treatment plans for each. If you have enough red blood cells and are feeling all right, you may not need active treatment, but you and your doctor will want to watch your condition carefully. If you start feeling lightheaded and have no energy, you may need a blood transfusion. Your doctor also may give you antibiotics to prevent or treat infection. Follow-up care is a key part of your treatment and safety. Be sure to make and go to all appointments, and call your doctor if you are having problems. It's also a good idea to know your test results and keep a list of the medicines you take. How can you care for yourself at home? · Take your medicines exactly as prescribed. Call your doctor if you think you are having a problem with your medicine. You will get more details on the specific medicines your doctor prescribes. · If your doctor prescribed antibiotics, take them as directed. Do not stop taking them just because you feel better. You need to take the full course of antibiotics. If you have side effects from antibiotics, tell your doctor. · Take steps to control your stress and workload. Learn relaxation techniques. ¨ Share your feelings. Stress and tension affect our emotions. By expressing your feelings to others, you may be able to understand and cope with them. ¨ Consider joining a support group. Talking about a problem with your spouse, a good friend, or other people with similar problems is a good way to reduce tension and stress. ¨ Express yourself with art. Try writing, crafts, dance, or art to relieve stress. ¨ Be kind to your body and mind. Getting enough sleep, eating a healthy diet, and taking time to do things you enjoy can contribute to an overall feeling of balance in your life and help reduce stress. ¨ Get help if you need it. Discuss your concerns with your doctor or counselor. · Do not smoke. Smoking can make blood problems worse. If you need help quitting, talk to your doctor about stop-smoking programs and medicines. These can increase your chances of quitting for good. · If you have not already done so, prepare a list of advance directives. Advance directives are instructions to your doctor and family members about what kind of care you want if you become unable to speak or express yourself. · Call the [employer] (9-943.415.5917) or visit its website at 3687 Nanotion. iTagged for more information. When should you call for help? Call 911 anytime you think you may need emergency care. For example, call if: 
? · You passed out (lost consciousness). ?Call your doctor now or seek immediate medical care if: 
? · You have a fever. ? · You have abnormal bleeding. ? · You have new or worse pain. ? · You think you have an infection. ? · You have new symptoms, such as a cough, belly pain, vomiting, diarrhea, or a rash. ? Watch closely for changes in your health, and be sure to contact your doctor if: 
? · You are much more tired than usual.  
? · You have swollen glands in your armpits, groin, or neck. ? · You do not get better as expected. Where can you learn more? Go to http://glenna-toby.info/. Enter Gisselle Alvarez in the search box to learn more about \"Myelodysplastic Syndromes: Care Instructions. \" Current as of: May 12, 2017 Content Version: 11.4 © 3990-3996 GeoPalz. Care instructions adapted under license by Cloud[employer] (which disclaims liability or warranty for this information). If you have questions about a medical condition or this instruction, always ask your healthcare professional. [address] any warranty or liability for your use of this information. Introducing [facility]! Dear Gustabo Davis: Thank you for requesting a WallSt[employer] account. Our records indicate that you already have an active WallStrip account. You can access your account anytime at [email or web address] Did you know that you can access your hospital and ER discharge instructions at any time in WallStrip? You can also review all of your test results from your hospital stay or ER visit. Additional Information If you have questions, please visit the Frequently Asked Questions section of the WallStrip website at [email or web address] Remember, WallStrip is NOT to be used for urgent needs. For medical emergencies, dial 911. Now available from your iPhone and Android! Please provide this summary of care documentation to your next provider. Your primary care clinician is listed as Ivan Quijano. If you have any questions after today's visit, please call [phone].

## 2017-11-06 NOTE — PATIENT INSTRUCTIONS
Myelodysplastic Syndromes: Care Instructions  Your Care Instructions  Myelodysplastic syndromes, also called MDS, are a group of rare conditions in which the bone marrow does not make enough healthy blood cells. Normally, the bone marrow makes red blood cells, white blood cells, and platelets. These cells carry oxygen in the blood, help the body fight infections, and help the blood clot. With MDS, you may feel weak and tired, get infections often, and bruise easily, although symptoms tend to vary. MDS is a form of blood cancer. In some cases, MDS can turn into acute myeloid leukemia (AML), another type of cancer. Some people develop MDS after treatment for cancer or exposure to pesticides or other chemicals. But in most cases, the cause of MDS is not known. Your doctor will use the results of blood tests to guide your treatment. There are many types of MDS, with different treatment plans for each. If you have enough red blood cells and are feeling all right, you may not need active treatment, but you and your doctor will want to watch your condition carefully. If you start feeling lightheaded and have no energy, you may need a blood transfusion. Your doctor also may give you antibiotics to prevent or treat infection. Follow-up care is a key part of your treatment and safety. Be sure to make and go to all appointments, and call your doctor if you are having problems. It's also a good idea to know your test results and keep a list of the medicines you take. How can you care for yourself at home? · Take your medicines exactly as prescribed. Call your doctor if you think you are having a problem with your medicine. You will get more details on the specific medicines your doctor prescribes. · If your doctor prescribed antibiotics, take them as directed. Do not stop taking them just because you feel better. You need to take the full course of antibiotics.  If you have side effects from antibiotics, tell your doctor. · Take steps to control your stress and workload. Learn relaxation techniques. ¨ Share your feelings. Stress and tension affect our emotions. By expressing your feelings to others, you may be able to understand and cope with them. ¨ Consider joining a support group. Talking about a problem with your spouse, a good friend, or other people with similar problems is a good way to reduce tension and stress. ¨ Express yourself with art. Try writing, crafts, dance, or art to relieve stress. ¨ Be kind to your body and mind. Getting enough sleep, eating a healthy diet, and taking time to do things you enjoy can contribute to an overall feeling of balance in your life and help reduce stress. ¨ Get help if you need it. Discuss your concerns with your doctor or counselor. · Do not smoke. Smoking can make blood problems worse. If you need help quitting, talk to your doctor about stop-smoking programs and medicines. These can increase your chances of quitting for good. · If you have not already done so, prepare a list of advance directives. Advance directives are instructions to your doctor and family members about what kind of care you want if you become unable to speak or express yourself. · Call the [employer] (2-987.722.9376) or visit its website at 3867 BitAnimate for more information. When should you call for help? Call 911 anytime you think you may need emergency care. For example, call if:  ? · You passed out (lost consciousness). ?Call your doctor now or seek immediate medical care if:  ? · You have a fever. ? · You have abnormal bleeding. ? · You have new or worse pain. ? · You think you have an infection. ? · You have new symptoms, such as a cough, belly pain, vomiting, diarrhea, or a rash. ? Watch closely for changes in your health, and be sure to contact your doctor if:  ? · You are much more tired than usual.   ? · You have swollen glands in your armpits, groin, or neck.    ? · You do not get better as expected. Where can you learn more? Go to http://glenna-toby.info/. Enter Billy Marcos in the search box to learn more about \"Myelodysplastic Syndromes: Care Instructions. \"  Current as of: May 12, 2017  Content Version: 11.4  © 6749-0835 Bluepay. Care instructions adapted under license by [employer]wl [employer] (which disclaims liability or warranty for this information). If you have questions about a medical condition or this instruction, always ask your healthcare professional. [address] any warranty or liability for your use of this information.

## 2017-11-06 NOTE — PROGRESS NOTES
Hematology/Oncology  Progress Note    Name: Orvis Slot  Date: 2017  : 1932    PCP: Carmen Salazar MD     Mr. June Castelan is an 80year old male who was seen for management of his myelodysplastic syndrome. The patient has a history of bladder cancer and prostate cancer    Current therapy Procrit 60,000 units SC whenever Hgb less than 10g/dl and  Hematocrit is below 30% respectively    Subjective:     Mr. Franchesca Kruger is an 51-year-old [facility] man who has an underlying diagnosis of refractory anemia/myelodysplastic syndrome. He is doing reasonably well. He is here today for a follow-up visit. He has a history of bladder cancer. He continues to complain of  fatigue and weakness. Occasionally he does experience some shortness of breath with exertion. He also has long-standing arthritic discomfort and this is essentially unchanged. Today he is using a cane for mobility support. He has not noticed any blood in his urine or stool. He also has a history of prostate cancer and receives Eligard every 4 months. He has no additional complaints to report at this time. Past Medical History:   Diagnosis Date    Arthritis     Bladder cancer ([facility] [address]     Diabetes ([facility] [address]     INsulin dependent    Gout     Myelodysplastic syndrome, unspecified     Personal history of prostate cancer     Renal cyst      Past Surgical History:   Procedure Laterality Date    HX BACK SURGERY      HX CATARACT REMOVAL      HX HERNIA REPAIR      HX KNEE REPLACEMENT       Social History     Social History    Marital status: UNKNOWN     Spouse name: N/A    Number of children: N/A    Years of education: N/A     Occupational History    Not on file.      Social History Main Topics    Smoking status: Former Smoker     Types: Cigarettes     Quit date: 1994    Smokeless tobacco: Never Used    Alcohol use No    Drug use: No    Sexual activity: Not on file     Other Topics Concern    Not on file     Social History Narrative Family History   Problem Relation Age of Onset    Diabetes Mother     Stroke Mother      Current Outpatient Prescriptions   Medication Sig Dispense Refill    traMADol (ULTRAM) 50 mg tablet Take 50 mg by mouth every six (6) hours as needed for Pain.  amLODIPine (NORVASC) 5 mg tablet Take 5 mg by mouth daily.  insulin glargine (LANTUS) 100 unit/mL injection by SubCUTAneous route nightly.  Cholecalciferol, Vitamin D3, 3,000 unit tab Take 6,000 Units by mouth.  aspirin delayed-release 81 mg tablet Take  by mouth daily.  isosorbide mononitrate ER (IMDUR) 30 mg tablet Take  by mouth daily.  TRUETEST TEST STRIPS strip       pioglitazone (ACTOS) 15 mg tablet       nitroglycerin (NITROSTAT) 0.4 mg SL tablet by SubLINGual route every five (5) minutes as needed.  simvastatin (ZOCOR) 40 mg tablet Take  by mouth nightly.  gabapentin (NEURONTIN) 300 mg capsule Take 300 mg by mouth three (3) times daily. Review of Systems  Constitutional: The patient has complaints of fatigue and weakness  HEENT: The patient denies recent head trauma, eye pain, blurred vision,  hearing deficit, oropharyngeal mucosal pain or lesions, and the patient denies throat pain or discomfort. Lymphatics: The patient denies palpable peripheral lymphadenopathy. Hematologic: The patient denies having bruising, bleeding, or progressive fatigue. Respiratory: Patient denies having shortness of breath, cough, sputum production, fever, or dyspnea on exertion. Cardiovascular: The patient denies having leg pain, leg swelling, heart palpitations, chest pain, or lightheadedness. The patient denies having dyspnea on exertion. Gastrointestinal: The patient denies having nausea, emesis, or diarrhea. The patient denies having any hematemesis or blood in the stool. Genitourinary: Patient denies having urinary urgency, frequency, or dysuria. The patient denies having blood in the urine.   Psychological: The patient denies having symptoms of nervousness, anxiety, depression, or thoughts of harming himself some of this. Skin: Patient denies having skin rashes, skin, ulcerations, or unexplained itching or pruritus. Musculoskeletal: The patient has long-standing arthritic discomfort in his lower extremities. He is using a cane for mobility support. Objective:     Visit Vitals    /52 (BP 1 Location: Right arm, BP Patient Position: Sitting)    Pulse (!) 59    Temp 97.5 °F (36.4 °C) (Oral)    Wt 132.5 kg (292 lb)    BMI 39.6 kg/m2     ECOG PS=0, Pain score=3/10    Physical Exam:   Gen. Appearance: The patient is in no acute distress. Skin: There is no bruise or rash. HEENT: The exam is unremarkable. Neck: Supple without lymphadenopathy or thyromegaly. Lungs: Clear to auscultation and percussion; there are no wheezes or rhonchi. Heart: Regular rate and rhythm; there are no murmurs, gallops, or rubs. Abdomen: Bowel sounds are present and normal.  There is no guarding, tenderness, or hepatosplenomegaly. Extremities: There is no clubbing, cyanosis, or edema. Neurologic: There are no focal neurologic deficits. Lymphatics: There is no palpable peripheral lymphadenopathy. Musculoskeletal: The patient has full range of motion at all joints. There is no evidence of joint deformity or effusions. There is no focal joint tenderness. Psychological/psychiatric: There is no clinical evidence of anxiety, depression, or melancholy.     Lab data:      Results for orders placed or performed during the hospital encounter of 11/06/17   CBC WITH 3 PART DIFF     Status: Abnormal   Result Value Ref Range Status    WBC 1.9 (L) 4.5 - 13.0 K/uL Final    RBC 3.53 (L) 4.10 - 5.10 M/uL Final    HGB 8.9 (L) 12.0 - 16 g/dL Final    HCT 31.1 (L) 36 - 48 % Final    MCV 88.1 78 - 102 FL Final    MCH 25.2 25.0 - 35.0 PG Final    MCHC 28.6 (L) 31 - 37 g/dL Final    RDW 19.6 (H) 11.5 - 14.5 % Final    NEUTROPHILS 50 40 - 70 % Final MIXED CELLS 8 0.1 - 17 % Final    LYMPHOCYTES 42 14 - 44 % Final    ABS. NEUTROPHILS 0.9 (L) 1.8 - 9.5 K/UL Final    ABS. MIXED CELLS 0.2 0.0 - 2.3 K/uL Final    ABS. LYMPHOCYTES 0.8 (L) 1.1 - 5.9 K/UL Final     Comment: Test performed at [place] [address] Location. Results Reviewed by Medical Director. DF AUTOMATED   Final           Assessment:     1. Myelodysplastic syndrome  2. Leukopenia  3. Thrombocytopenia  4. 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 History of Prostate cancer/History of bladder cancer     Plan:     Myelodysplastic syndrome: I have explained to the patient that his CBC today reveals that his WBC 1.9,  hemoglobin 8.9g/dL, hematocrit 31.1%, and his preliminary platelet count is 601,356. We will continue to monitor his CBC at 3 month intervals. Procrit will be provided whenever the hemoglobin is below 10 g/dL and hematocrit is below 30%. An iron profile and ferritin level will be ordered along with a comprehensive metabolic panel. Leukopenia: The current CBC shows a decline of WBC count of 1.9 and the absolute neutrophil count is 0.9. I have explained to the patient that if his absolute neutrophil count declines below 0.5 we will start him on treatment with Neupogen. Pt denies any fevers, unexplained infections or rash. .     Thrombocytopenia: The current CBC shows that his preliminary  platelet counts are relatively stable at 117,000. Therapeutic intervention is not necessary unless the platelet counts decline below 30,000. History of prostate cancer: The patient is clinically stable. The patient continues to receive Eligard every 4 months. On 07/18 his PSA count was 4.5. I will order a PSA level today. History of bladder cancer:  Patient is s/p for a urological procedure on 12/2/2016 by Dr. Meenakshi Sherwood. He has routine follow-up appts with the urologist.    Arthritis: The patient is using Tramadol which provides adequate relief. This will continue.     I will have the patient return in 3 months for a complete assessment or sooner if indicated.     Orders Placed This Encounter    COMPLETE CBC & AUTO DIFF WBC    InHouse CBC ([facility])     Standing Status:   Future     Number of Occurrences:   1     Standing Expiration Date:   08/23/6330    METABOLIC PANEL, COMPREHENSIVE     Standing Status:   Future     Number of Occurrences:   1     Standing Expiration Date:   11/7/2018    IRON PROFILE     Standing Status:   Future     Number of Occurrences:   1     Standing Expiration Date:   11/7/2018    FERRITIN     Standing Status:   Future     Number of Occurrences:   1     Standing Expiration Date:   11/7/2018    VITAMIN D, 25 HYDROXY     Standing Status:   Future     Number of Occurrences:   1     Standing Expiration Date:   11/7/2018    PROSTATE SPECIFIC AG     Standing Status:   Future     Number of Occurrences:   1     Standing Expiration Date:   11/7/2018       Jessica Schultz MD  11/6/2017

## 2017-11-07 LAB
25(OH)D3+25(OH)D2 SERPL-MCNC: 40.4 NG/ML (ref 30–100)
ALBUMIN SERPL-MCNC: 3.9 G/DL (ref 3.5–4.7)
ALBUMIN/GLOB SERPL: 1.5 {RATIO} (ref 1.2–2.2)
ALP SERPL-CCNC: 47 IU/L (ref 39–117)
ALT SERPL-CCNC: 8 IU/L (ref 0–44)
AST SERPL-CCNC: 19 IU/L (ref 0–40)
BILIRUB SERPL-MCNC: 0.6 MG/DL (ref 0–1.2)
BUN SERPL-MCNC: 18 MG/DL (ref 8–27)
BUN/CREAT SERPL: 13 (ref 10–24)
CALCIUM SERPL-MCNC: 8.5 MG/DL (ref 8.6–10.2)
CHLORIDE SERPL-SCNC: 97 MMOL/L (ref 96–106)
CO2 SERPL-SCNC: 30 MMOL/L (ref 18–29)
CREAT SERPL-MCNC: 1.42 MG/DL (ref 0.76–1.27)
FERRITIN SERPL-MCNC: 227 NG/ML (ref 30–400)
GFR SERPLBLD CREATININE-BSD FMLA CKD-EPI: 45 ML/MIN/1.73
GFR SERPLBLD CREATININE-BSD FMLA CKD-EPI: 52 ML/MIN/1.73
GLOBULIN SER CALC-MCNC: 2.6 G/DL (ref 1.5–4.5)
GLUCOSE SERPL-MCNC: 188 MG/DL (ref 65–99)
IRON SATN MFR SERPL: 33 % (ref 15–55)
IRON SERPL-MCNC: 91 UG/DL (ref 38–169)
POTASSIUM SERPL-SCNC: 4.4 MMOL/L (ref 3.5–5.2)
PROT SERPL-MCNC: 6.5 G/DL (ref 6–8.5)
PSA SERPL-MCNC: 4 NG/ML (ref 0–4)
SODIUM SERPL-SCNC: 142 MMOL/L (ref 134–144)
TIBC SERPL-MCNC: 274 UG/DL (ref 250–450)
UIBC SERPL-MCNC: 183 UG/DL (ref 111–343)

## 2017-11-08 NOTE — PROGRESS NOTES
Called patient to inform lab results. Mailbox is full so I can not leave message. Will try to call again today.

## 2017-11-27 ENCOUNTER — HOSPITAL ENCOUNTER (OUTPATIENT)
Dept: INFUSION THERAPY | Age: 82
End: 2017-11-27

## 2017-11-27 NOTE — PROGRESS NOTES
Mr. Vianca Tristan did NOT show up today, Monday, 11-27-17, for his [facility] appointment for CBC/Procrit injection. However, the [facility] appointment was rescheduled to Thursday, 11-30-17 at 1100, per  Iam Baron.

## 2017-11-30 ENCOUNTER — HOSPITAL ENCOUNTER (OUTPATIENT)
Dept: INFUSION THERAPY | Age: 82
Discharge: HOME | End: 2017-11-30

## 2017-12-12 ENCOUNTER — HOSPITAL ENCOUNTER (OUTPATIENT)
Dept: INFUSION THERAPY | Age: 82
End: 2017-12-12
Payer: MEDICARE

## 2017-12-15 ENCOUNTER — HOSPITAL ENCOUNTER (OUTPATIENT)
Dept: INFUSION THERAPY | Age: 82
Discharge: HOME | End: 2017-12-15
Payer: MEDICARE

## 2017-12-15 LAB
BASO+EOS+MONOS # BLD AUTO: 0.2 K/UL (ref 0–2.3)
BASO+EOS+MONOS # BLD AUTO: 9 % (ref 0.1–17)
DIFFERENTIAL METHOD BLD: ABNORMAL
ERYTHROCYTE [DISTWIDTH] IN BLOOD BY AUTOMATED COUNT: 18.9 % (ref 11.5–14.5)
HCT VFR BLD AUTO: 31.4 % (ref 36–48)
HGB BLD-MCNC: 9.1 G/DL (ref 12–16)
LYMPHOCYTES # BLD: 1 K/UL (ref 1.1–5.9)
LYMPHOCYTES NFR BLD: 43 % (ref 14–44)
MCH RBC QN AUTO: 25.7 PG (ref 25–35)
MCHC RBC AUTO-ENTMCNC: 29 G/DL (ref 31–37)
MCV RBC AUTO: 88.7 FL (ref 78–102)
NEUTS SEG # BLD: 1.2 K/UL (ref 1.8–9.5)
NEUTS SEG NFR BLD: 49 % (ref 40–70)
RBC # BLD AUTO: 3.54 M/UL (ref 4.1–5.1)
WBC # BLD AUTO: 2.4 K/UL (ref 4.5–13)

## 2017-12-15 PROCEDURE — 36415 COLL VENOUS BLD VENIPUNCTURE: CPT

## 2017-12-15 PROCEDURE — 85025 COMPLETE CBC W/AUTO DIFF WBC: CPT | Performed by: INTERNAL MEDICINE

## 2017-12-15 PROCEDURE — 85049 AUTOMATED PLATELET COUNT: CPT | Performed by: INTERNAL MEDICINE

## 2017-12-18 LAB — PLATELET # BLD AUTO: 101 K/UL (ref 135–420)

## 2018-03-05 ENCOUNTER — OFFICE VISIT (OUTPATIENT)
Dept: ONCOLOGY | Age: 83
End: 2018-03-05

## 2018-03-05 ENCOUNTER — HOSPITAL ENCOUNTER (OUTPATIENT)
Dept: ONCOLOGY | Age: 83
Discharge: HOME | End: 2018-03-05

## 2018-03-05 VITALS
DIASTOLIC BLOOD PRESSURE: 74 MMHG | SYSTOLIC BLOOD PRESSURE: 111 MMHG | HEART RATE: 63 BPM | TEMPERATURE: 98.6 F | HEIGHT: 72 IN

## 2018-03-05 DIAGNOSIS — D46.9 MYELODYSPLASTIC SYNDROME (HCC): ICD-10-CM

## 2018-03-05 DIAGNOSIS — M19.90 ARTHRITIS: ICD-10-CM

## 2018-03-05 DIAGNOSIS — D69.6 THROMBOCYTOPENIA (HCC): ICD-10-CM

## 2018-03-05 DIAGNOSIS — C67.4 MALIGNANT NEOPLASM OF POSTERIOR WALL OF URINARY BLADDER (HCC): ICD-10-CM

## 2018-03-05 DIAGNOSIS — D46.9 MYELODYSPLASTIC SYNDROME (HCC): Primary | ICD-10-CM

## 2018-03-05 DIAGNOSIS — D63.1 ANEMIA DUE TO CHRONIC KIDNEY DISEASE: ICD-10-CM

## 2018-03-05 DIAGNOSIS — E55.9 VITAMIN D DEFICIENCY: ICD-10-CM

## 2018-03-05 DIAGNOSIS — N18.9 ANEMIA DUE TO CHRONIC KIDNEY DISEASE: ICD-10-CM

## 2018-03-05 DIAGNOSIS — C61 PROSTATE CANCER (HCC): ICD-10-CM

## 2018-03-05 LAB
BASO+EOS+MONOS # BLD AUTO: 0.2 K/UL (ref 0–2.3)
BASO+EOS+MONOS # BLD AUTO: 9 % (ref 0.1–17)
DIFFERENTIAL METHOD BLD: ABNORMAL
ERYTHROCYTE [DISTWIDTH] IN BLOOD BY AUTOMATED COUNT: 20.2 % (ref 11.5–14.5)
HCT VFR BLD AUTO: 30.2 % (ref 36–48)
HGB BLD-MCNC: 9 G/DL (ref 12–16)
LYMPHOCYTES # BLD: 1 K/UL (ref 1.1–5.9)
LYMPHOCYTES NFR BLD: 39 % (ref 14–44)
MCH RBC QN AUTO: 25.6 PG (ref 25–35)
MCHC RBC AUTO-ENTMCNC: 29.8 G/DL (ref 31–37)
MCV RBC AUTO: 86 FL (ref 78–102)
NEUTS SEG # BLD: 1.3 K/UL (ref 1.8–9.5)
NEUTS SEG NFR BLD: 52 % (ref 40–70)
PLATELET # BLD AUTO: 103 K/UL (ref 135–420)
RBC # BLD AUTO: 3.51 M/UL (ref 4.1–5.1)
WBC # BLD AUTO: 2.5 K/UL (ref 4.5–13)

## 2018-03-05 NOTE — MR AVS SNAPSHOT
[address] 
[phone] Patient: Kevyn Uriarte 
MRN: [id number] Visit Information Date & Time Provider Department Dept. Phone Encounter #  
 3/5/2018 11:45 AM Stephanie Dong MD [facility] [phone] [id number] Follow-up Instructions Return in about 3 months (around 6/5/2018). Your Appointments 6/4/2018  1:30 PM  
Office Visit with Stephanie Dong MD  
[facility]  [facility] [place]) Appt Note: 3 MO RET  
 [address] [place] [address] 3200 [facility], [facility] 105 [facility] [address]  
  
    
 6/19/2018  9:30 AM  
PROCEDURE with Amada Gresham MD  
[facility] Appt Note: 4mo Cysto/ Cytology 709 [facility] 300 [facility]  
  
    
  
 3/22/2018 10:30 AM  
Any with Amada Gresahm MD  
[facility] Appt Note: 4mo Eligard 709 [facility] 1097 [facility]  
  
   
 709 [facility] [address] Upcoming Health Maintenance Date Due  
 FOOT EXAM Q1 11/14/1942 EYE EXAM RETINAL OR DILATED Q1 11/14/1942 DTaP/Tdap/Td series (1 - Tdap) 11/14/1953 ZOSTER VACCINE AGE 60> 9/14/1992 GLAUCOMA SCREENING Q2Y 11/14/1997 MEDICARE YEARLY EXAM 11/14/1997 HEMOGLOBIN A1C Q6M 7/4/2010 MICROALBUMIN Q1 1/4/2011 LIPID PANEL Q1 1/4/2011 Pneumococcal 65+ High/Highest Risk (2 of 2 - PPSV23) 5/29/2017 Influenza Age 5 to Adult 8/1/2017 Allergies as of 3/5/2018  Review Complete On: 2/27/2018 By: Marko Washington Severity Noted Reaction Type Reactions Allopurinol  05/15/2014    Unknown (comments) Pt states he is not allergic to allopurinol Current Immunizations  Reviewed on 9/1/2017 No immunizations on file. Not reviewed this visit You Were Diagnosed With   
  
 Codes Comments Myelodysplastic syndrome ([facility].)    -  Primary ICD-10-CM: D46.9 ICD-9-CM: 238.75 Prostate cancer [facility])     ICD-10-CM: X46 ICD-9-CM: 525 Malignant neoplasm of posterior wall of urinary bladder (HCC)     ICD-10-CM: C67.4 ICD-9-CM: 188. 4 Vitamin D deficiency     ICD-10-CM: E55.9 ICD-9-CM: 268.9 Thrombocytopenia ([facility].)     ICD-10-CM: D69.6 ICD-9-CM: 287.5 Anemia due to chronic kidney disease     ICD-10-CM: N18.9, D63.1 ICD-9-CM: 285.21 Arthritis     ICD-10-CM: M19.90 ICD-9-CM: 716.90 Vitals BP Pulse Temp Height(growth percentile) Smoking Status 111/74 63 98.6 °F (37 °C) 6' (1.829 m) Former Smoker Preferred Pharmacy Pharmacy Name Phone [id number] [address] [place] [address] Rd [phone] Your Updated Medication List  
  
   
This list is accurate as of 3/5/18  1:22 PM.  Always use your most recent med list.  
  
  
  
  
 allopurinol 100 mg tablet Commonly known as:  Dakotah Angles Take  by mouth daily. amLODIPine 5 mg tablet Commonly known as:  Larayne Cecilia Take 5 mg by mouth daily. aspirin delayed-release 81 mg tablet Take  by mouth daily. carvedilol 25 mg tablet Commonly known as:  COREG  
take 1 tablet by mouth twice a day Cholecalciferol (Vitamin D3) 3,000 unit Tab Take 6,000 Units by mouth. furosemide 40 mg tablet Commonly known as:  LASIX Take  by mouth daily. gabapentin 300 mg capsule Commonly known as:  NEURONTIN Take 300 mg by mouth three (3) times daily. isosorbide mononitrate ER 30 mg tablet Commonly known as:  IMDUR Take  by mouth daily. LANTUS U-100 INSULIN 100 unit/mL injection Generic drug:  insulin glargine  
by SubCUTAneous route nightly. NITROSTAT 0.4 mg SL tablet Generic drug:  nitroglycerin  
by SubLINGual route every five (5) minutes as needed. pioglitazone 15 mg tablet Commonly known as:  ACTOS  
  
 simvastatin 40 mg tablet Commonly known as:  ZOCOR Take  by mouth nightly. traMADol 50 mg tablet Commonly known as:  ULTRAM  
Take 50 mg by mouth every six (6) hours as needed for Pain. TRUETEST TEST STRIPS strip Generic drug:  glucose blood VI test strips We Performed the Following COMPLETE CBC & AUTO DIFF WBC [47342 CPT(R)] Follow-up Instructions Return in about 3 months (around 6/5/2018). To-Do List   
 03/05/2018 Lab:  CBC WITH 3 PART DIFF   
  
 03/05/2018 Lab:  FERRITIN   
  
 03/05/2018 Lab:  IRON PROFILE   
  
 03/05/2018 Lab:  METABOLIC PANEL, COMPREHENSIVE   
  
 03/05/2018 Lab:  PSA, DIAGNOSTIC (PROSTATE SPECIFIC AG) 03/05/2018 Lab:  VITAMIN D, 25 HYDROXY Patient Instructions Prostate Cancer: Care Instructions Your Care Instructions The prostate gland is a small, walnut-shaped organ that lies just below a man's bladder. It surrounds the urethra, the tube that carries urine from the bladder out of the body through the penis. Prostate cancer is the abnormal growth of cells in the prostate gland. Prostate cancer cells can spread within the prostate, to nearby lymph nodes and other tissues, and to other parts of the body. When the cancer hasn't spread outside the prostate, it is called localized prostate cancer. With localized prostate cancer, your options depend on how likely it is that your cancer will grow. Tests will show if your cancer is likely to grow. · Low-risk cancer isn't likely to grow right away. If your cancer is low-risk, you can choose active surveillance.  This means your cancer will be watched closely by your doctor with regular checkups and tests to see if the cancer grows. This choice allows you to delay having surgery or radiation, often for many years. If the cancer grows very slowly, you may never need treatment. · Medium-risk cancer is more likely to grow. Some men with this type of cancer may be able to choose active surveillance. Others may need to choose surgery or radiation. · High-risk cancer is most likely to grow. If you have high-risk cancer, you will likely need to choose surgery or radiation. If your cancer has already spread outside the prostate or to other parts of the body, then you may have other treatments, like chemotherapy or hormone therapy. If you are over age [de-identified] or have other serious health problems, like heart disease, you may choose not to have treatments to cure your cancer. Instead, you can just have treatments to manage your symptoms. This is called watchful waiting. Finding out that you have cancer is scary. You may feel many emotions and may need some help coping. Seek out family, friends, and counselors for support. You also can do things at home to make yourself feel better while you go through treatment. Call the [employer] (0-114.670.6075) or visit its website at 2436 ROIÂ[employer]. [employer]ocate for more information. Follow-up care is a key part of your treatment and safety. Be sure to make and go to all appointments, and call your doctor if you are having problems. It's also a good idea to know your test results and keep a list of the medicines you take. How can you care for yourself at home? · Your doctor will talk to you about your treatment options. You may need to learn more about each of them before you can decide which treatment is best for you. · Take your medicines exactly as prescribed. Call your doctor if you think you are having a problem with your medicine. You will get more details on the specific medicines your doctor prescribes. · Eat healthy food. If you do not feel like eating, try to eat food that has protein and extra calories to keep up your strength and prevent weight loss. Drink liquid meal replacements for extra calories and protein. Try to eat your main meal early. · Take steps to control your stress and workload. Learn relaxation techniques. ¨ Share your feelings. Stress and tension affect our emotions. By expressing your feelings to others, you may be able to understand and cope with them. ¨ Consider joining a support group. Talking about a problem with your spouse, a good friend, or other people with similar problems is a good way to reduce tension and stress. ¨ Express yourself through art. Try writing, crafts, dance, or art to relieve stress. Some dance, writing, or art groups may be available just for people who have cancer. ¨ Be kind to your body and mind. Getting enough sleep, eating a healthy diet, and taking time to do things you enjoy can contribute to an overall feeling of balance in your life and can help reduce stress. ¨ Get help if you need it. Discuss your concerns with your doctor or counselor. · Get some physical activity every day, but do not get too tired. Keep doing the hobbies you enjoy as your energy allows. · If you are vomiting or have diarrhea: ¨ Drink plenty of fluids (enough so that your urine is light yellow or clear like water) to prevent dehydration. Choose water and other caffeine-free clear liquids. If you have kidney, heart, or liver disease and have to limit fluids, talk with your doctor before you increase the amount of fluids you drink. ¨ When you are able to eat, try clear soups, mild foods, and liquids until all symptoms are gone for 12 to 48 hours. Jell-O, dry toast, crackers, and cooked cereal are also good choices. · If you have not already done so, prepare a list of advance directives.  Advance directives are instructions to your doctor and family members about what kind of care you want if you become unable to speak or express yourself. When should you call for help? Call 911 anytime you think you may need emergency care. For example, call if: 
? · You passed out (lost consciousness). ?Call your doctor now or seek immediate medical care if: 
? · You have new or worse pain. ? · You have new symptoms, such as a cough, belly pain, vomiting, diarrhea, or a rash. ? · You have symptoms of a urinary tract infection. For example: ¨ You have blood or pus in your urine. ¨ You have pain in your back just below your rib cage. This is called flank pain. ¨ You have a fever, chills, or body aches. ¨ It hurts to urinate. ¨ You have groin or belly pain. ? Watch closely for changes in your health, and be sure to contact your doctor if: 
? · You have swollen glands in your armpits, groin, or neck. ? · You have trouble controlling your urine. ? · You do not get better as expected. Where can you learn more? Go to http://glennaInteliCoat Technologiestoby.info/. Enter V141 in the search box to learn more about \"Prostate Cancer: Care Instructions. \" Current as of: May 12, 2017 Content Version: 11.4 © 7477-6532 [employer]. Care instructions adapted under license by [employer] (which disclaims liability or warranty for this information). If you have questions about a medical condition or this instruction, always ask your healthcare professional. [place] [address] any warranty or liability for your use of this information. Myelodysplastic Syndromes: Care Instructions Your Care Instructions Myelodysplastic syndromes, also called MDS, are a group of rare conditions in which the bone marrow does not make enough healthy blood cells. Normally, the bone marrow makes red blood cells, white blood cells, and platelets.  These cells carry oxygen in the blood, help the body fight infections, and help the blood clot. With MDS, you may feel weak and tired, get infections often, and bruise easily, although symptoms tend to vary. MDS is a form of blood cancer. In some cases, MDS can turn into acute myeloid leukemia (AML), another type of cancer. Some people develop MDS after treatment for cancer or exposure to pesticides or other chemicals. But in most cases, the cause of MDS is not known. Your doctor will use the results of blood tests to guide your treatment. There are many types of MDS, with different treatment plans for each. If you have enough red blood cells and are feeling all right, you may not need active treatment, but you and your doctor will want to watch your condition carefully. If you start feeling lightheaded and have no energy, you may need a blood transfusion. Your doctor also may give you antibiotics to prevent or treat infection. Follow-up care is a key part of your treatment and safety. Be sure to make and go to all appointments, and call your doctor if you are having problems. It's also a good idea to know your test results and keep a list of the medicines you take. How can you care for yourself at home? · Take your medicines exactly as prescribed. Call your doctor if you think you are having a problem with your medicine. You will get more details on the specific medicines your doctor prescribes. · If your doctor prescribed antibiotics, take them as directed. Do not stop taking them just because you feel better. You need to take the full course of antibiotics. If you have side effects from antibiotics, tell your doctor. · Take steps to control your stress and workload. Learn relaxation techniques. ¨ Share your feelings. Stress and tension affect our emotions. By expressing your feelings to others, you may be able to understand and cope with them. ¨ Consider joining a support group.  Talking about a problem with your spouse, a good friend, or other people with similar problems is a good way to reduce tension and stress. ¨ Express yourself with art. Try writing, crafts, dance, or art to relieve stress. ¨ Be kind to your body and mind. Getting enough sleep, eating a healthy diet, and taking time to do things you enjoy can contribute to an overall feeling of balance in your life and help reduce stress. ¨ Get help if you need it. Discuss your concerns with your doctor or counselor. · Do not smoke. Smoking can make blood problems worse. If you need help quitting, talk to your doctor about stop-smoking programs and medicines. These can increase your chances of quitting for good. · If you have not already done so, prepare a list of advance directives. Advance directives are instructions to your doctor and family members about what kind of care you want if you become unable to speak or express yourself. · Call the [employer] (4-378.194.6028) or visit its website at [employer] for more information. When should you call for help? Call 911 anytime you think you may need emergency care. For example, call if: 
? · You passed out (lost consciousness). ?Call your doctor now or seek immediate medical care if: 
? · You have a fever. ? · You have abnormal bleeding. ? · You have new or worse pain. ? · You think you have an infection. ? · You have new symptoms, such as a cough, belly pain, vomiting, diarrhea, or a rash. ? Watch closely for changes in your health, and be sure to contact your doctor if: 
? · You are much more tired than usual.  
? · You have swollen glands in your armpits, groin, or neck. ? · You do not get better as expected. Where can you learn more? Go to http://glenna-toby.info/. Enter Marcella Zacheryus in the search box to learn more about \"Myelodysplastic Syndromes: Care Instructions. \" Current as of: May 12, 2017 Content Version: 11.4 © 0272-4243 [employer]. Care instructions adapted under license by [employer] (which disclaims liability or warranty for this information). If you have questions about a medical condition or this instruction, always ask your healthcare professional. [address] any warranty or liability for your use of this information. Introducing [facility]! Dear Yogi Alvarez: Thank you for requesting a [employer]is account. Our records indicate that you already have an active [employer]is account. You can access your account anytime at [email or web address] Did you know that you can access your hospital and ER discharge instructions at any time in Tubis? You can also review all of your test results from your hospital stay or ER visit. Additional Information If you have questions, please visit the Frequently Asked Questions section of the [employer] website at [email or web address] Remember, Tubis is NOT to be used for urgent needs. For medical emergencies, dial 911. Now available from your iPhone and Android! Please provide this summary of care documentation to your next provider. Your primary care clinician is listed as Zoraida Lizarraga. If you have any questions after today's visit, please call [phone].

## 2018-03-05 NOTE — PROGRESS NOTES
Hematology/Oncology  Progress Note    Name: Mitchel Escalante  Date: 3/5/2018  : 1932    PCP: Trish Thomas NP     Mr. Giles Rucker is a 80 y.o. man who is seen for myriad medical problems including myelodysplastic syndrome and chronic anemia. Current therapy: Procrit 60,000 units every 2 weeks whenever the hemoglobin is below 10 g/dL and hematocrit is below 30%. Subjective:     Mr. Kartik Raman is an 80-year-old -American man who has a long-standing history of myelodysplastic syndrome. He also has chronic renal insufficiency and thus chronic anemia. He continues to tolerate the Procrit therapy without significant untoward side effects. His treatment is provided every 2-3 weeks whenever his hemoglobin is below 10 g/dL and hematocrit is below 30%. The patient has long-standing arthritic discomfort in his joints. He is using a cane or walker for mobility support most of the time. Past medical history, family history, and social history: these were reviewed and remains unchanged. Past Medical History:   Diagnosis Date    Arthritis     [facility] ([facility]     Diabetes ([facility]     INsulin dependent    Gout     Myelodysplastic syndrome, unspecified     Personal history of prostate cancer     Renal cyst      Past Surgical History:   Procedure Laterality Date    HX BACK SURGERY      HX CATARACT REMOVAL      HX HERNIA REPAIR      HX KNEE REPLACEMENT       Social History     Social History    Marital status: UNKNOWN     Spouse name: N/A    Number of children: N/A    Years of education: N/A     Occupational History    Not on file.      Social History Main Topics    Smoking status: Former Smoker     Types: Cigarettes     Quit date: 1994    Smokeless tobacco: Never Used    Alcohol use No    Drug use: No    Sexual activity: Not on file     Other Topics Concern    Not on file     Social History Narrative     Family History   Problem Relation Age of Onset    Diabetes Mother     Stroke Mother      Current Outpatient Prescriptions   Medication Sig Dispense Refill    allopurinol (ZYLOPRIM) 100 mg tablet Take  by mouth daily.  carvedilol (COREG) 25 mg tablet take 1 tablet by mouth twice a day  0    furosemide (LASIX) 40 mg tablet Take  by mouth daily.  traMADol (ULTRAM) 50 mg tablet Take 50 mg by mouth every six (6) hours as needed for Pain.  amLODIPine (NORVASC) 5 mg tablet Take 5 mg by mouth daily.  insulin glargine (LANTUS) 100 unit/mL injection by SubCUTAneous route nightly.  Cholecalciferol, Vitamin D3, 3,000 unit tab Take 6,000 Units by mouth.  aspirin delayed-release 81 mg tablet Take  by mouth daily.  isosorbide mononitrate ER (IMDUR) 30 mg tablet Take  by mouth daily.  TRUETEST TEST STRIPS strip       pioglitazone (ACTOS) 15 mg tablet       nitroglycerin (NITROSTAT) 0.4 mg SL tablet by SubLINGual route every five (5) minutes as needed.  simvastatin (ZOCOR) 40 mg tablet Take  by mouth nightly.  gabapentin (NEURONTIN) 300 mg capsule Take 300 mg by mouth three (3) times daily. Review of Systems  Constitutional: The patient has no acute distress or discomfort. HEENT: The patient denies recent head trauma, eye pain, blurred vision,  hearing deficit, oropharyngeal mucosal pain or lesions, and the patient denies throat pain or discomfort. Lymphatics: The patient denies palpable peripheral lymphadenopathy. Hematologic: The patient denies having bruising, bleeding, or progressive fatigue. Respiratory: Patient denies having shortness of breath, cough, sputum production, fever, or dyspnea on exertion. Cardiovascular: The patient denies having leg pain, leg swelling, heart palpitations, chest permit, chest pain, or lightheadedness. The patient denies having dyspnea on exertion. Gastrointestinal: The patient denies having nausea, emesis, or diarrhea. The patient denies having any hematemesis or blood in the stool.   Genitourinary: Patient denies having urinary urgency, frequency, or dysuria. The patient denies having blood in the urine. Psychological: The patient denies having symptoms of nervousness, anxiety, depression, or thoughts of harming self. Skin: Patient denies having skin rashes, skin, ulcerations, or unexplained itching or pruritus. Musculoskeletal: The patient denies having pain in the joints or bones. Objective:     Visit Vitals    /74    Pulse 63    Temp 98.6 °F (37 °C)    Ht 6' (1.829 m)     ECOG PS=1; pain score=2/10    Physical Exam:   Gen. Appearance: The patient is in no acute distress. Skin: There is no bruise or rash. HEENT: The exam is unremarkable. Neck: Supple without lymphadenopathy or thyromegaly. Lungs: Clear to auscultation and percussion; there are no wheezes or rhonchi. Heart: Regular rate and rhythm; there are no murmurs, gallops, or rubs. Abdomen: Bowel sounds are present and normal.  There is no guarding, tenderness, or hepatosplenomegaly. Extremities: There is no clubbing, cyanosis, or edema. Neurologic: There are no focal neurologic deficits. Lymphatics: There is no palpable peripheral lymphadenopathy. Musculoskeletal: The patient has full range of motion at all joints. There is no evidence of joint deformity or effusions. There is no focal joint tenderness. Psychological/psychiatric: There is no clinical evidence of anxiety, depression, or melancholy.     Lab data:      Results for orders placed or performed during the hospital encounter of 03/05/18   CBC WITH 3 PART DIFF     Status: Abnormal   Result Value Ref Range Status    WBC 2.5 (L) 4.5 - 13.0 K/uL Final    RBC 3.51 (L) 4.10 - 5.10 M/uL Final    HGB 9.0 (L) 12.0 - 16 g/dL Final    HCT 30.2 (L) 36 - 48 % Final    MCV 86.0 78 - 102 FL Final    MCH 25.6 25.0 - 35.0 PG Final    MCHC 29.8 (L) 31 - 37 g/dL Final    RDW 20.2 (H) 11.5 - 14.5 % Final    NEUTROPHILS 52 40 - 70 % Final    MIXED CELLS 9 0.1 - 17 % Final LYMPHOCYTES 39 14 - 44 % Final    ABS. NEUTROPHILS 1.3 (L) 1.8 - 9.5 K/UL Final    ABS. MIXED CELLS 0.2 0.0 - 2.3 K/uL Final    ABS. LYMPHOCYTES 1.0 (L) 1.1 - 5.9 K/UL Final     Comment: Test performed at [place] [id number] Location. Results Reviewed by Medical Director. DF AUTOMATED   Final           Assessment:     1. Myelodysplastic syndrome ([facility]    2. Prostate cancer ([facility]    3. Malignant neoplasm of posterior wall of urinary bladder (HCC)    4. Vitamin D deficiency    5. Thrombocytopenia ([facility]    6. Anemia due to chronic kidney disease    7. Arthritis      Plan:   Myelodysplastic syndrome: I have explained to the patient that his CBC today reveals that his WBC 2.5, the hemoglobin is 9 g/dL, hematocrit is 30.2%, and his preliminary platelet count is 415,754. We will continue to monitor his CBC at 3 month intervals. Procrit will be provided whenever the hemoglobin is below 10 g/dL and hematocrit is below 30%. An iron profile and ferritin level will be ordered along with a comprehensive metabolic panel. Leukopenia: The current CBC shows a decline of WBC count of 2.5 and the absolute neutrophil count is 0 1.3. I have explained to the patient that if his absolute neutrophil count declines below 0.5 we will start him on treatment with Neupogen. Pt denies any fevers, unexplained infections or rash. .     Thrombocytopenia: The current CBC shows that his preliminary  platelet counts are relatively stable at 112,000. Therapeutic intervention is not necessary unless the platelet counts decline below 30,000. History of prostate cancer: The patient is clinically stable. The patient continues to receive Eligard every 4 months. On 11/6/2017 his PSA was 4 ng/mL. I will order a PSA level today. History of bladder cancer:  Patient is s/p for a urological procedure on 12/2/2016 by Dr. Maritza Harris.  He continues to have routine follow-up appts with the urologist.    Chronic arthritis: The patient is using Tramadol which provides adequate relief. This will continue. I will have the patient return in 3 months for a complete assessment or sooner if indicated. Orders Placed This Encounter    COMPLETE CBC & AUTO DIFF WBC    InHouse CBC ([employer])     Standing Status:   Future     Number of Occurrences:   1     Standing Expiration Date:   3/65/2556    METABOLIC PANEL, COMPREHENSIVE     Standing Status:   Future     Standing Expiration Date:   3/6/2019    IRON PROFILE     Standing Status:   Future     Standing Expiration Date:   3/6/2019    FERRITIN     Standing Status:   Future     Standing Expiration Date:   3/6/2019    PROSTATE SPECIFIC AG     Standing Status:   Future     Standing Expiration Date:   3/6/2019    VITAMIN D, 25 HYDROXY     Standing Status:   Future     Standing Expiration Date:   3/6/2019       Jamar Tobar MD  3/5/2018      Please note: This document has been produced using voice recognition software. Unrecognized errors in transcription may be present.

## 2018-03-05 NOTE — PATIENT INSTRUCTIONS
Prostate Cancer: Care Instructions  Your Care Instructions    The prostate gland is a small, walnut-shaped organ that lies just below a man's bladder. It surrounds the urethra, the tube that carries urine from the bladder out of the body through the penis. Prostate cancer is the abnormal growth of cells in the prostate gland. Prostate cancer cells can spread within the prostate, to nearby lymph nodes and other tissues, and to other parts of the body. When the cancer hasn't spread outside the prostate, it is called localized prostate cancer. With localized prostate cancer, your options depend on how likely it is that your cancer will grow. Tests will show if your cancer is likely to grow. · Low-risk cancer isn't likely to grow right away. If your cancer is low-risk, you can choose active surveillance. This means your cancer will be watched closely by your doctor with regular checkups and tests to see if the cancer grows. This choice allows you to delay having surgery or radiation, often for many years. If the cancer grows very slowly, you may never need treatment. · Medium-risk cancer is more likely to grow. Some men with this type of cancer may be able to choose active surveillance. Others may need to choose surgery or radiation. · High-risk cancer is most likely to grow. If you have high-risk cancer, you will likely need to choose surgery or radiation. If your cancer has already spread outside the prostate or to other parts of the body, then you may have other treatments, like chemotherapy or hormone therapy. If you are over age [de-identified] or have other serious health problems, like heart disease, you may choose not to have treatments to cure your cancer. Instead, you can just have treatments to manage your symptoms. This is called watchful waiting. Finding out that you have cancer is scary. You may feel many emotions and may need some help coping. Seek out family, friends, and counselors for support.  You also can do things at home to make yourself feel better while you go through treatment. Call the [employer] ([phone]) or visit its website at [address] for more information. Follow-up care is a key part of your treatment and safety. Be sure to make and go to all appointments, and call your doctor if you are having problems. It's also a good idea to know your test results and keep a list of the medicines you take. How can you care for yourself at home? · Your doctor will talk to you about your treatment options. You may need to learn more about each of them before you can decide which treatment is best for you. · Take your medicines exactly as prescribed. Call your doctor if you think you are having a problem with your medicine. You will get more details on the specific medicines your doctor prescribes. · Eat healthy food. If you do not feel like eating, try to eat food that has protein and extra calories to keep up your strength and prevent weight loss. Drink liquid meal replacements for extra calories and protein. Try to eat your main meal early. · Take steps to control your stress and workload. Learn relaxation techniques. ¨ Share your feelings. Stress and tension affect our emotions. By expressing your feelings to others, you may be able to understand and cope with them. ¨ Consider joining a support group. Talking about a problem with your spouse, a good friend, or other people with similar problems is a good way to reduce tension and stress. ¨ Express yourself through art. Try writing, crafts, dance, or art to relieve stress. Some dance, writing, or art groups may be available just for people who have cancer. ¨ Be kind to your body and mind. Getting enough sleep, eating a healthy diet, and taking time to do things you enjoy can contribute to an overall feeling of balance in your life and can help reduce stress. ¨ Get help if you need it.  Discuss your concerns with your doctor or counselor. · Get some physical activity every day, but do not get too tired. Keep doing the hobbies you enjoy as your energy allows. · If you are vomiting or have diarrhea:  ¨ Drink plenty of fluids (enough so that your urine is light yellow or clear like water) to prevent dehydration. Choose water and other caffeine-free clear liquids. If you have kidney, heart, or liver disease and have to limit fluids, talk with your doctor before you increase the amount of fluids you drink. ¨ When you are able to eat, try clear soups, mild foods, and liquids until all symptoms are gone for 12 to 48 hours. Jell-O, dry toast, crackers, and cooked cereal are also good choices. · If you have not already done so, prepare a list of advance directives. Advance directives are instructions to your doctor and family members about what kind of care you want if you become unable to speak or express yourself. When should you call for help? Call 911 anytime you think you may need emergency care. For example, call if:  ? · You passed out (lost consciousness). ?Call your doctor now or seek immediate medical care if:  ? · You have new or worse pain. ? · You have new symptoms, such as a cough, belly pain, vomiting, diarrhea, or a rash. ? · You have symptoms of a urinary tract infection. For example:  ¨ You have blood or pus in your urine. ¨ You have pain in your back just below your rib cage. This is called flank pain. ¨ You have a fever, chills, or body aches. ¨ It hurts to urinate. ¨ You have groin or belly pain. ? Watch closely for changes in your health, and be sure to contact your doctor if:  ? · You have swollen glands in your armpits, groin, or neck. ? · You have trouble controlling your urine. ? · You do not get better as expected. Where can you learn more? Go to http://glenna-toby.info/. Enter V141 in the search box to learn more about \"Prostate Cancer: Care Instructions. \"  Current as of:  May 12, 2017  Content Version: 11.4  © 9793-2031 [employer]. Care instructions adapted under license by [employer] (which disclaims liability or warranty for this information). If you have questions about a medical condition or this instruction, always ask your healthcare professional. [address] any warranty or liability for your use of this information. Myelodysplastic Syndromes: Care Instructions  Your Care Instructions  Myelodysplastic syndromes, also called MDS, are a group of rare conditions in which the bone marrow does not make enough healthy blood cells. Normally, the bone marrow makes red blood cells, white blood cells, and platelets. These cells carry oxygen in the blood, help the body fight infections, and help the blood clot. With MDS, you may feel weak and tired, get infections often, and bruise easily, although symptoms tend to vary. MDS is a form of blood cancer. In some cases, MDS can turn into acute myeloid leukemia (AML), another type of cancer. Some people develop MDS after treatment for cancer or exposure to pesticides or other chemicals. But in most cases, the cause of MDS is not known. Your doctor will use the results of blood tests to guide your treatment. There are many types of MDS, with different treatment plans for each. If you have enough red blood cells and are feeling all right, you may not need active treatment, but you and your doctor will want to watch your condition carefully. If you start feeling lightheaded and have no energy, you may need a blood transfusion. Your doctor also may give you antibiotics to prevent or treat infection. Follow-up care is a key part of your treatment and safety. Be sure to make and go to all appointments, and call your doctor if you are having problems. It's also a good idea to know your test results and keep a list of the medicines you take. How can you care for yourself at home?   · Take your medicines exactly as prescribed. Call your doctor if you think you are having a problem with your medicine. You will get more details on the specific medicines your doctor prescribes. · If your doctor prescribed antibiotics, take them as directed. Do not stop taking them just because you feel better. You need to take the full course of antibiotics. If you have side effects from antibiotics, tell your doctor. · Take steps to control your stress and workload. Learn relaxation techniques. ¨ Share your feelings. Stress and tension affect our emotions. By expressing your feelings to others, you may be able to understand and cope with them. ¨ Consider joining a support group. Talking about a problem with your spouse, a good friend, or other people with similar problems is a good way to reduce tension and stress. ¨ Express yourself with art. Try writing, crafts, dance, or art to relieve stress. ¨ Be kind to your body and mind. Getting enough sleep, eating a healthy diet, and taking time to do things you enjoy can contribute to an overall feeling of balance in your life and help reduce stress. ¨ Get help if you need it. Discuss your concerns with your doctor or counselor. · Do not smoke. Smoking can make blood problems worse. If you need help quitting, talk to your doctor about stop-smoking programs and medicines. These can increase your chances of quitting for good. · If you have not already done so, prepare a list of advance directives. Advance directives are instructions to your doctor and family members about what kind of care you want if you become unable to speak or express yourself. · Call the [employer] (2-260.544.7707) or visit its website at [address] for more information. When should you call for help? Call 911 anytime you think you may need emergency care. For example, call if:  ? · You passed out (lost consciousness).    ?Call your doctor now or seek immediate medical care if:  ? · You have a fever. ? · You have abnormal bleeding. ? · You have new or worse pain. ? · You think you have an infection. ? · You have new symptoms, such as a cough, belly pain, vomiting, diarrhea, or a rash. ? Watch closely for changes in your health, and be sure to contact your doctor if:  ? · You are much more tired than usual.   ? · You have swollen glands in your armpits, groin, or neck. ? · You do not get better as expected. Where can you learn more? Go to http://glenna-toby.info/. Enter Renée Cage in the search box to learn more about \"Myelodysplastic Syndromes: Care Instructions. \"  Current as of: May 12, 2017  Content Version: 11.4  © 3438-8039 [employer]. Care instructions adapted under license by [employer] (which disclaims liability or warranty for this information). If you have questions about a medical condition or this instruction, always ask your healthcare professional. [address] any warranty or liability for your use of this information.

## 2018-03-06 LAB
25(OH)D3+25(OH)D2 SERPL-MCNC: 29.1 NG/ML (ref 30–100)
ALBUMIN SERPL-MCNC: 3.9 G/DL (ref 3.5–4.7)
ALBUMIN/GLOB SERPL: 1.4 {RATIO} (ref 1.2–2.2)
ALP SERPL-CCNC: 44 IU/L (ref 39–117)
ALT SERPL-CCNC: 8 IU/L (ref 0–44)
AST SERPL-CCNC: 19 IU/L (ref 0–40)
BILIRUB SERPL-MCNC: 0.6 MG/DL (ref 0–1.2)
BUN SERPL-MCNC: 29 MG/DL (ref 8–27)
BUN/CREAT SERPL: 24 (ref 10–24)
CALCIUM SERPL-MCNC: 8.7 MG/DL (ref 8.6–10.2)
CHLORIDE SERPL-SCNC: 100 MMOL/L (ref 96–106)
CO2 SERPL-SCNC: 28 MMOL/L (ref 18–29)
CREAT SERPL-MCNC: 1.21 MG/DL (ref 0.76–1.27)
FERRITIN SERPL-MCNC: 197 NG/ML (ref 30–400)
GFR SERPLBLD CREATININE-BSD FMLA CKD-EPI: 54 ML/MIN/1.73
GFR SERPLBLD CREATININE-BSD FMLA CKD-EPI: 63 ML/MIN/1.73
GLOBULIN SER CALC-MCNC: 2.7 G/DL (ref 1.5–4.5)
GLUCOSE SERPL-MCNC: 135 MG/DL (ref 65–99)
IRON SATN MFR SERPL: 25 % (ref 15–55)
IRON SERPL-MCNC: 69 UG/DL (ref 38–169)
POTASSIUM SERPL-SCNC: 4.7 MMOL/L (ref 3.5–5.2)
PROT SERPL-MCNC: 6.6 G/DL (ref 6–8.5)
PSA SERPL-MCNC: 4.4 NG/ML (ref 0–4)
SODIUM SERPL-SCNC: 143 MMOL/L (ref 134–144)
TIBC SERPL-MCNC: 281 UG/DL (ref 250–450)
UIBC SERPL-MCNC: 212 UG/DL (ref 111–343)

## 2018-03-07 DIAGNOSIS — E55.9 VITAMIN D DEFICIENCY: Primary | ICD-10-CM

## 2018-03-07 RX ORDER — ERGOCALCIFEROL 1.25 MG/1
50000 CAPSULE ORAL
Qty: 12 CAP | Refills: 0 | Status: SHIPPED | OUTPATIENT
Start: 2018-03-07 | End: 2018-03-08 | Stop reason: SDUPTHER

## 2018-03-08 DIAGNOSIS — E55.9 VITAMIN D DEFICIENCY: Primary | ICD-10-CM

## 2018-03-08 RX ORDER — ERGOCALCIFEROL 1.25 MG/1
50000 CAPSULE ORAL
Qty: 12 CAP | Refills: 0 | Status: SHIPPED | OUTPATIENT
Start: 2018-03-08

## 2018-04-17 PROBLEM — E11.21 TYPE 2 DIABETES WITH NEPHROPATHY (HCC): Status: ACTIVE | Noted: 2018-04-17

## 2018-04-17 PROBLEM — E66.01 OBESITY, MORBID (HCC): Status: ACTIVE | Noted: 2018-04-17

## 2018-08-15 ENCOUNTER — HOSPITAL ENCOUNTER (OUTPATIENT)
Dept: ONCOLOGY | Age: 83
Discharge: HOME | End: 2018-08-15

## 2018-08-15 ENCOUNTER — OFFICE VISIT (OUTPATIENT)
Dept: ONCOLOGY | Age: 83
End: 2018-08-15

## 2018-08-15 VITALS
BODY MASS INDEX: 39.2 KG/M2 | SYSTOLIC BLOOD PRESSURE: 110 MMHG | HEART RATE: 58 BPM | DIASTOLIC BLOOD PRESSURE: 56 MMHG | TEMPERATURE: 98.4 F | RESPIRATION RATE: 18 BRPM | WEIGHT: 289 LBS

## 2018-08-15 DIAGNOSIS — D69.6 THROMBOCYTOPENIA (HCC): ICD-10-CM

## 2018-08-15 DIAGNOSIS — E55.9 VITAMIN D DEFICIENCY: ICD-10-CM

## 2018-08-15 DIAGNOSIS — D46.9 MYELODYSPLASTIC SYNDROME (HCC): ICD-10-CM

## 2018-08-15 DIAGNOSIS — D46.9 MYELODYSPLASTIC SYNDROME (HCC): Primary | ICD-10-CM

## 2018-08-15 DIAGNOSIS — D63.1 ANEMIA DUE TO CHRONIC KIDNEY DISEASE: ICD-10-CM

## 2018-08-15 DIAGNOSIS — C61 PROSTATE CANCER (HCC): ICD-10-CM

## 2018-08-15 DIAGNOSIS — N18.9 ANEMIA DUE TO CHRONIC KIDNEY DISEASE: ICD-10-CM

## 2018-08-15 LAB
BASO+EOS+MONOS # BLD AUTO: 0.3 K/UL (ref 0–2.3)
BASO+EOS+MONOS # BLD AUTO: 10 % (ref 0.1–17)
DIFFERENTIAL METHOD BLD: ABNORMAL
ERYTHROCYTE [DISTWIDTH] IN BLOOD BY AUTOMATED COUNT: 21.5 % (ref 11.5–14.5)
HCT VFR BLD AUTO: 29.8 % (ref 36–48)
HGB BLD-MCNC: 8.5 G/DL (ref 12–16)
LYMPHOCYTES # BLD: 1.2 K/UL (ref 1.1–5.9)
LYMPHOCYTES NFR BLD: 38 % (ref 14–44)
MCH RBC QN AUTO: 25 PG (ref 25–35)
MCHC RBC AUTO-ENTMCNC: 28.5 G/DL (ref 31–37)
MCV RBC AUTO: 87.6 FL (ref 78–102)
NEUTS SEG # BLD: 1.5 K/UL (ref 1.8–9.5)
NEUTS SEG NFR BLD: 51 % (ref 40–70)
PLATELET # BLD AUTO: 185 K/UL (ref 140–440)
RBC # BLD AUTO: 3.4 M/UL (ref 4.1–5.1)
WBC # BLD AUTO: 3 K/UL (ref 4.5–13)

## 2018-08-15 NOTE — PATIENT INSTRUCTIONS
Anemia From Chronic Disease: Care Instructions  Your Care Instructions    Anemia is a low level of red blood cells. Red blood cells carry oxygen from your lungs to the rest of your body. Sometimes when you have a long-term (chronic) disease, such as kidney disease, arthritis, diabetes, cancer, or an infection, your body does not make enough red blood cells. Follow-up care is a key part of your treatment and safety. Be sure to make and go to all appointments, and call your doctor if you are having problems. It's also a good idea to know your test results and keep a list of the medicines you take. How can you care for yourself at home? · Follow your doctor's instructions to treat the chronic condition that is causing the anemia. · Be safe with medicines. Take your medicine to treat your chronic condition exactly as prescribed. Call your doctor if you think you are having a problem with your medicine. · Take your medicine for anemia exactly as prescribed. Call your doctor if you think you are having a problem with your medicine. Medicines to increase the number of red blood cells (such as epoetin or darbepoetin) may be given as an injection. ¨ If you miss a dose, take it as soon as you can, unless it is almost time for your next dose. In that case, get back on your regular schedule and take only one dose. ¨ Do not freeze this medicine. Store it in the refrigerator. Do not shake the bottle before you prepare the shot. · Keep all your appointments for blood tests to check on your hemoglobin levels. When should you call for help? Call 911 anytime you think you may need emergency care.  For example, call if:    · You passed out (lost consciousness).    Call your doctor now or seek immediate medical care if:    · You are short of breath.     · You are dizzy or light-headed, or you feel like you may faint.     · You have new or worse bleeding.    Watch closely for changes in your health, and be sure to contact your doctor if:    · You feel weaker or more tired than usual.     · You do not get better as expected. Where can you learn more? Go to http://glenna-toby.info/. Enter E502 in the search box to learn more about \"Anemia From Chronic Disease: Care Instructions. \"  Current as of: October 9, 2017  Content Version: 11.7  © 1258-7528 [employer]. Care instructions adapted under license by [employer] (which disclaims liability or warranty for this information). If you have questions about a medical condition or this instruction, always ask your healthcare professional. [place] [address] any warranty or liability for your use of this information. Myelodysplastic Syndromes: Care Instructions  Your Care Instructions  Myelodysplastic syndromes, also called MDS, are a group of rare conditions in which the bone marrow does not make enough healthy blood cells. Normally, the bone marrow makes red blood cells, white blood cells, and platelets. These cells carry oxygen in the blood, help the body fight infections, and help the blood clot. With MDS, you may feel weak and tired, get infections often, and bruise easily, although symptoms tend to vary. MDS is a form of blood cancer. In some cases, MDS can turn into acute myeloid leukemia (AML), another type of cancer. Some people develop MDS after treatment for cancer or exposure to pesticides or other chemicals. But in most cases, the cause of MDS is not known. Your doctor will use the results of blood tests to guide your treatment. There are many types of MDS, with different treatment plans for each. If you have enough red blood cells and are feeling all right, you may not need active treatment, but you and your doctor will want to watch your condition carefully. If you start feeling lightheaded and have no energy, you may need a blood transfusion.  Your doctor also may give you antibiotics to prevent or treat infection. Follow-up care is a key part of your treatment and safety. Be sure to make and go to all appointments, and call your doctor if you are having problems. It's also a good idea to know your test results and keep a list of the medicines you take. How can you care for yourself at home? · Take your medicines exactly as prescribed. Call your doctor if you think you are having a problem with your medicine. You will get more details on the specific medicines your doctor prescribes. · If your doctor prescribed antibiotics, take them as directed. Do not stop taking them just because you feel better. You need to take the full course of antibiotics. If you have side effects from antibiotics, tell your doctor. · Take steps to control your stress and workload. Learn relaxation techniques. ¨ Share your feelings. Stress and tension affect our emotions. By expressing your feelings to others, you may be able to understand and cope with them. ¨ Consider joining a support group. Talking about a problem with your spouse, a good friend, or other people with similar problems is a good way to reduce tension and stress. ¨ Express yourself with art. Try writing, crafts, dance, or art to relieve stress. ¨ Be kind to your body and mind. Getting enough sleep, eating a healthy diet, and taking time to do things you enjoy can contribute to an overall feeling of balance in your life and help reduce stress. ¨ Get help if you need it. Discuss your concerns with your doctor or counselor. · Do not smoke. Smoking can make blood problems worse. If you need help quitting, talk to your doctor about stop-smoking programs and medicines. These can increase your chances of quitting for good. · If you have not already done so, prepare a list of advance directives. Advance directives are instructions to your doctor and family members about what kind of care you want if you become unable to speak or express yourself.   · Call the [employer] Society (1-259.217.5390) or visit its website at [address].cl. org for more information. When should you call for help? Call 911 anytime you think you may need emergency care. For example, call if:    · You passed out (lost consciousness).    Call your doctor now or seek immediate medical care if:    · You have a fever.     · You have abnormal bleeding.     · You have new or worse pain.     · You think you have an infection.     · You have new symptoms, such as a cough, belly pain, vomiting, diarrhea, or a rash.    Watch closely for changes in your health, and be sure to contact your doctor if:    · You are much more tired than usual.     · You have swollen glands in your armpits, groin, or neck.     · You do not get better as expected. Where can you learn more? Go to http://glennaTrendingGamestoby.info/. Enter Deep Jorge in the search box to learn more about \"Myelodysplastic Syndromes: Care Instructions. \"  Current as of: May 12, 2017  Content Version: 11.7  © 5220-7170 [employer]. Care instructions adapted under license by [employer] (which disclaims liability or warranty for this information). If you have questions about a medical condition or this instruction, always ask your healthcare professional. [address] any warranty or liability for your use of this information. Complete Blood Count (CBC): About This Test  What is it? A complete blood count (CBC) is a blood test that gives important information about your blood cells, especially red blood cells, white blood cells, and platelets. Why is this test done? A CBC may be done as part of a regular physical exam. There are many other reasons that a doctor may want this blood test, including to:  · Find the cause of symptoms such as fatigue, weakness, fever, bruising, or weight loss. · Find anemia or an infection. · See how much blood has been lost if there is bleeding.   · Diagnose diseases of the blood, such as leukemia or polycythemia. How can you prepare for the test?  You do not need to do anything before having this test.  What happens during the test?  The health professional taking a sample of your blood will:  · Wrap an elastic band around your upper arm. This makes the veins below the band larger so it is easier to put a needle into the vein. · Clean the needle site with alcohol. · Put the needle into the vein. · Attach a tube to the needle to fill it with blood. · Remove the band from your arm when enough blood is collected. · Put a gauze pad or cotton ball over the needle site as the needle is removed. · Put pressure on the site and then put on a bandage. If this blood test is done on a baby, a heel stick may be done instead of a blood draw from a vein. What happens after the test?  · You will probably be able to go home right away. · You can go back to your usual activities right away. Follow-up care is a key part of your treatment and safety. Be sure to make and go to all appointments, and call your doctor if you are having problems. It's also a good idea to keep a list of the medicines you take. Ask your doctor when you can expect to have your test results. Where can you learn more? Go to http://glenna-toby.info/. Enter I716 in the search box to learn more about \"Complete Blood Count (CBC): About This Test.\"  Current as of: October 9, 2017  Content Version: 11.7  © 3913-7436 [employer]. Care instructions adapted under license by [employer] (which disclaims liability or warranty for this information). If you have questions about a medical condition or this instruction, always ask your healthcare professional. [address] any warranty or liability for your use of this information.

## 2018-08-15 NOTE — PROGRESS NOTES
Hematology/Oncology  Progress Note    Name: Mary Schroeder. Date: 8/15/2018  : 1932    PCP: Imelda Parekh NP     Mr. Fior Damon is a 80 y.o. man who is seen for myriad medical problems including myelodysplastic syndrome and chronic anemia. Current therapy: Procrit 60,000 units every 2 weeks whenever the hemoglobin is below 10 g/dL and hematocrit is below 30%. Subjective:     Mr. Fior Damon is an 51-year-old -American man who has a long-standing history of myelodysplastic syndrome. He also has chronic renal insufficiency and thus chronic anemia. The patient has long-standing arthritic discomfort in his joints. He is using a cane or walker for mobility support most of the time. He is in the office with family member. Past medical history, family history, and social history: these were reviewed and remains unchanged. Past Medical History:   Diagnosis Date    Arthritis     Bl[facility] ([facility] [address]     Diabetes ([facility] [address]     INsulin dependent    Gout     Myelodysplastic syndrome, unspecified ([facility] [address]     Personal history of prostate cancer     Renal cyst      Past Surgical History:   Procedure Laterality Date    HX BACK SURGERY      HX CATARACT REMOVAL      HX HERNIA REPAIR      HX KNEE REPLACEMENT       Social History     Social History    Marital status:      Spouse name: N/A    Number of children: N/A    Years of education: N/A     Occupational History    Not on file.      Social History Main Topics    Smoking status: Former Smoker     Types: Cigarettes     Quit date: 1994    Smokeless tobacco: Never Used    Alcohol use No    Drug use: No    Sexual activity: Not on file     Other Topics Concern    Not on file     Social History Narrative     Family History   Problem Relation Age of Onset    Diabetes Mother     Stroke Mother      Current Outpatient Prescriptions   Medication Sig Dispense Refill    amoxicillin-clavulanate (AUGMENTIN) 875-125 mg per tablet TAKE 1 TABLET BY MOUTH EVERY 12 HOURS FOR 4 DAYS WITH FOOD OR MILK  0    VITAMIN C 250 mg tablet TAKE 1 TABLET BY MOUTH ONCE DAILY. TAKE ON EMPTY STOMACH WITH FERROUS SULFATE  0    colchicine 0.6 mg tablet take 1 tablet by mouth once daily  0    ferrous sulfate 325 mg (65 mg iron) tablet TAKE 1 TABLET BY MOUTH ONCE DAILY WITH BREAKFAST. TAKE WITH VITAMIN C ON EMPTY STOMACH  0    Insulin Syringes, Disposable, 1 mL syrg BD INsulin syringes with the BD Ultra Fine needle-8 mm/31 gauge      ACCU-CHEK MULTICLIX LANCET misc   0    lidocaine (SALONPAS/ASPERCREME) 4 % patch Apply 1 Patch to affected area.  lidocaine (LIDODERM) 5 % 1 Patch.  naproxen (NAPROSYN) 500 mg tablet TAKE 1 TABLET BY MOUTH EVERY 12 HOURS AS NEEDED FOR PAIN FOR UP TO 30 DAYS  0    leuprolide (ELIGARD, 4 MONTH,) 30 mg syrg injection 1 Syringe by SubCUTAneous route once for 1 dose. 1 Syringe 0    ergocalciferol (VITAMIN D2) 50,000 unit capsule Take 1 Cap by mouth every seven (7) days. Indications: Vitamin D Deficiency 12 Cap 0    allopurinol (ZYLOPRIM) 100 mg tablet Take  by mouth daily.  carvedilol (COREG) 25 mg tablet take 1 tablet by mouth twice a day  0    furosemide (LASIX) 40 mg tablet Take  by mouth daily.  traMADol (ULTRAM) 50 mg tablet Take 50 mg by mouth every six (6) hours as needed for Pain.  amLODIPine (NORVASC) 5 mg tablet Take 5 mg by mouth daily.  insulin glargine (LANTUS) 100 unit/mL injection by SubCUTAneous route nightly.  Cholecalciferol, Vitamin D3, 3,000 unit tab Take 6,000 Units by mouth.  aspirin delayed-release 81 mg tablet Take  by mouth daily.  isosorbide mononitrate ER (IMDUR) 30 mg tablet Take  by mouth daily.  TRUETEST TEST STRIPS strip       pioglitazone (ACTOS) 15 mg tablet       nitroglycerin (NITROSTAT) 0.4 mg SL tablet by SubLINGual route every five (5) minutes as needed.  simvastatin (ZOCOR) 40 mg tablet Take  by mouth nightly.       gabapentin (NEURONTIN) 300 mg capsule Take 300 mg by mouth three (3) times daily. Review of Systems  Constitutional: The patient has no acute distress or discomfort. HEENT: The patient denies recent head trauma, eye pain, blurred vision,  hearing deficit, oropharyngeal mucosal pain or lesions, and the patient denies throat pain or discomfort. Lymphatics: The patient denies palpable peripheral lymphadenopathy. Hematologic: The patient denies having bruising, bleeding, or progressive fatigue. Respiratory: Patient denies having shortness of breath, cough, sputum production, fever, or dyspnea on exertion. Cardiovascular: The patient denies having leg pain, leg swelling, heart palpitations, chest permit, chest pain, or lightheadedness. The patient denies having dyspnea on exertion. Gastrointestinal: The patient denies having nausea, emesis, or diarrhea. The patient denies having any hematemesis or blood in the stool. Genitourinary: Patient denies having urinary urgency, frequency, or dysuria. The patient denies having blood in the urine. Psychological: The patient denies having symptoms of nervousness, anxiety, depression, or thoughts of harming self. Skin: Patient denies having skin rashes, skin, ulcerations, or unexplained itching or pruritus. Musculoskeletal: The patient denies having pain in the joints or bones. Objective:     Visit Vitals    /56 (BP 1 Location: Left arm, BP Patient Position: Sitting)    Pulse (!) 58    Temp 98.4 °F (36.9 °C) (Oral)    Resp 18    Wt 131.1 kg (289 lb)    BMI 39.2 kg/m2     ECOG PS=1; pain score=2/10    Physical Exam:   Gen. Appearance: The patient is in no acute distress. Skin: There is no bruise or rash. HEENT: The exam is unremarkable. Neck: Supple without lymphadenopathy or thyromegaly. Lungs: Clear to auscultation and percussion; there are no wheezes or rhonchi. Heart: Regular rate and rhythm; there are no murmurs, gallops, or rubs. Abdomen:  Bowel sounds are present and normal.  There is no guarding, tenderness, or hepatosplenomegaly. Extremities: There is no clubbing, cyanosis, or edema. Neurologic: There are no focal neurologic deficits. Lymphatics: There is no palpable peripheral lymphadenopathy. Musculoskeletal: The patient has full range of motion at all joints. There is no evidence of joint deformity or effusions. There is no focal joint tenderness. Psychological/psychiatric: There is no clinical evidence of anxiety, depression, or melancholy. Lab data:      Results for orders placed or performed during the hospital encounter of 08/15/18   CBC WITH 3 PART DIFF     Status: Abnormal   Result Value Ref Range Status    WBC 3.0 (L) 4.5 - 13.0 K/uL Final    RBC 3.40 (L) 4.10 - 5.10 M/uL Final    HGB 8.5 (L) 12.0 - 16 g/dL Final    HCT 29.8 (L) 36 - 48 % Final    MCV 87.6 78 - 102 FL Final    MCH 25.0 25.0 - 35.0 PG Final    MCHC 28.5 (L) 31 - 37 g/dL Final    RDW 21.5 (H) 11.5 - 14.5 % Final    PLATELET 319 307 - 688 K/uL Final    NEUTROPHILS 51 40 - 70 % Final    MIXED CELLS 10 0.1 - 17 % Final    LYMPHOCYTES 38 14 - 44 % Final    ABS. NEUTROPHILS 1.5 (L) 1.8 - 9.5 K/UL Final    ABS. MIXED CELLS 0.3 0.0 - 2.3 K/uL Final    ABS. LYMPHOCYTES 1.2 1.1 - 5.9 K/UL Final     Comment: Test performed at [address]. Results Reviewed by Medical Director. DF AUTOMATED   Final           Assessment:     1. Myelodysplastic syndrome ([facility] [address]    2. Thrombocytopenia ([facility] [address]    3. Vitamin D deficiency    4. Anemia due to chronic kidney disease    5. Prostate cancer ([facility] [address]      Plan:   Myelodysplastic syndrome: I have explained to the patient that his CBC today reveals that his WBC 3.0, the hemoglobin is 8.5 g/dL, hematocrit is 29.8%, and his platelet count is 018,496. We will continue to monitor his CBC at 3 month intervals. Procrit will be resume for every 4 weeks whenever the hemoglobin is below 10 g/dL and hematocrit is below 30%.   An iron profile and ferritin level will be ordered along with a comprehensive metabolic panel. Leukopenia: The current CBC shows a decline of WBC count of 3.0 and the absolute neutrophil count is 1.5. I have explained to the patient that if his absolute neutrophil count declines below 0.5 we will start him on treatment with Neupogen. Pt denies any fevers, unexplained infections or rash. .     Thrombocytopenia: The current CBC shows that his platelet counts are relatively stable at 185,000. Therapeutic intervention is not necessary unless the platelet counts decline below 30,000. History of prostate cancer: The patient is clinically stable. The patient continues to receive Eligard every 4 months. On 03/05/2018 his PSA was 4.4ng/mL. I will order a PSA level today. History of bladder cancer:  Patient is s/p for a urological procedure on 12/2/2016 by Dr. Rony Alvarez. He continues to have routine follow-up appts with the urologist.    Chronic arthritis: The patient is using Tramadol which provides adequate relief. This will continue. I will have the patient return in 3 months for a complete assessment or sooner if indicated.   Orders Placed This Encounter    COMPLETE CBC & AUTO DIFF WBC    InHouse CBC ([employer])     Standing Status:   Future     Number of Occurrences:   1     Standing Expiration Date:   1/34/7490    METABOLIC PANEL, COMPREHENSIVE     Standing Status:   Future     Number of Occurrences:   1     Standing Expiration Date:   8/16/2019    IRON PROFILE     Standing Status:   Future     Number of Occurrences:   1     Standing Expiration Date:   8/16/2019    FERRITIN     Standing Status:   Future     Number of Occurrences:   1     Standing Expiration Date:   8/16/2019    PROSTATE SPECIFIC AG     Standing Status:   Future     Number of Occurrences:   1     Standing Expiration Date:   8/16/2019    VITAMIN D, 25 HYDROXY     Standing Status:   Future     Number of Occurrences:   1     Standing Expiration Date:   8/16/2019 Etelvina Harris NP  8/15/2018     I have assessed the patient independently and  agree with the full assessment as outlined. Arianne Gar MD, FACP      Please note: This document has been produced using voice recognition software. Unrecognized errors in transcription may be present.

## 2018-08-15 NOTE — MR AVS SNAPSHOT
[address] [facility] 
 
 
 [address] [address] 200 [facility] 
[phone] Patient: Leigh Kaplan 
MRN: [id number] RPJ:92/42/1994 Visit Information Date & Time Provider Department Dept. Phone Encounter #  
 8/15/2018  9:15 AM Andrew Berman MD [facility] [phone] [id number] Your Appointments 11/19/2018 10:00 AM  
Office Visit with Andrew Berman MD  
[address] Oncology [facility]) Appt Note: 3 MO RET  
 [address] [facility] [address] [facility] [address]  
  
   
 [address] [place] [address] Sander Dia  
  
    
  
 8/15/2018 11:15 AM  
Any with Dinora Jerez MD  
[facility] Appt Note: 4mo Eligard 08-8 to 08-14  
 [id number] [address] [address] [address] [facility]  
  
   
 [address] [facility] 23[address] [facility] 100 200 [facility] Upcoming Health Maintenance Date Due  
 FOOT EXAM Q1 11/14/1942 EYE EXAM RETINAL OR DILATED Q1 11/14/1942 DTaP/Tdap/Td series (1 - Tdap) 11/14/1953 ZOSTER VACCINE AGE 60> 9/14/1992 GLAUCOMA SCREENING Q2Y 11/14/1997 HEMOGLOBIN A1C Q6M 7/4/2010 MICROALBUMIN Q1 1/4/2011 LIPID PANEL Q1 1/4/2011 Pneumococcal 65+ High/Highest Risk (2 of 2 - PPSV23) 5/29/2017 MEDICARE YEARLY EXAM 3/14/2018 Influenza Age 5 to Adult 8/1/2018 Allergies as of 8/15/2018  Review Complete On: 8/15/2018 By: Andrew Berman MD  
  
 Severity Noted Reaction Type Reactions Allopurinol  05/15/2014    Unknown (comments) Pt states he is not allergic to allopurinol Current Immunizations  Reviewed on 9/1/2017 No immunizations on file. Not reviewed this visit You Were Diagnosed With   
  
 Codes Comments Myelodysplastic syndrome ([facility]    -  Primary ICD-10-CM: D46.9 ICD-9-CM: 238.75   
 Thrombocytopenia ([facility]     ICD-10-CM: D69.6 ICD-9-CM: 287.5 Vitamin D deficiency     ICD-10-CM: E55.9 ICD-9-CM: 268.9 Anemia due to chronic kidney disease     ICD-10-CM: N18.9, D63.1 ICD-9-CM: 285.21 Prostate cancer [facility])     ICD-10-CM: X71 ICD-9-CM: 274 Vitals BP Pulse Temp Resp Weight(growth percentile) BMI  
 110/56 (BP 1 Location: Left arm, BP Patient Position: Sitting) (!) 58 98.4 °F (36.9 °C) (Oral) 18 289 lb (131.1 kg) 39.2 kg/m2 Smoking Status Former Smoker BMI and BSA Data Body Mass Index Body Surface Area  
 39.2 kg/m 2 2.58 m 2 Preferred Pharmacy Pharmacy Name Phone [id number] [address] [place] [address] Rd [phone] Your Updated Medication List  
  
   
This list is accurate as of 8/15/18  9:57 AM.  Always use your most recent med list.  
  
  
  
  
 allopurinol 100 mg tablet Commonly known as:  Yu Bless Take  by mouth daily. amLODIPine 5 mg tablet Commonly known as:  Varghese Evangelista Take 5 mg by mouth daily. aspirin delayed-release 81 mg tablet Take  by mouth daily. carvedilol 25 mg tablet Commonly known as:  COREG  
take 1 tablet by mouth twice a day Cholecalciferol (Vitamin D3) 3,000 unit Tab Take 6,000 Units by mouth.  
  
 ergocalciferol 50,000 unit capsule Commonly known as:  VITAMIN D2 Take 1 Cap by mouth every seven (7) days. Indications: Vitamin D Deficiency  
  
 furosemide 40 mg tablet Commonly known as:  LASIX Take  by mouth daily. gabapentin 300 mg capsule Commonly known as:  NEURONTIN Take 300 mg by mouth three (3) times daily. isosorbide mononitrate ER 30 mg tablet Commonly known as:  IMDUR Take  by mouth daily. LANTUS U-100 INSULIN 100 unit/mL injection Generic drug:  insulin glargine  
by SubCUTAneous route nightly. NITROSTAT 0.4 mg SL tablet Generic drug:  nitroglycerin by SubLINGual route every five (5) minutes as needed. pioglitazone 15 mg tablet Commonly known as:  ACTOS  
  
 simvastatin 40 mg tablet Commonly known as:  ZOCOR Take  by mouth nightly. traMADol 50 mg tablet Commonly known as:  ULTRAM  
Take 50 mg by mouth every six (6) hours as needed for Pain. TRUETEST TEST STRIPS strip Generic drug:  glucose blood VI test strips We Performed the Following COMPLETE CBC & AUTO DIFF WBC [60438 CPT(R)] To-Do List   
 08/15/2018 Lab:  CBC WITH 3 PART DIFF Introducing [facility] SERVICES! Dear Oc Mena: Thank you for requesting a [employer] account. Our records indicate that you already have an active [employer] account. You can access your account anytime at [email or web address]. [email or web address] Did you know that you can access your hospital and ER discharge instructions at any time in [employer]? You can also review all of your test results from your hospital stay or ER visit. Additional Information If you have questions, please visit the Frequently Asked Questions section of the [employer] website at [email or web address] Remember, [employer] is NOT to be used for urgent needs. For medical emergencies, dial 911. Now available from your iPhone and Android! Please provide this summary of care documentation to your next provider. Your primary care clinician is listed as Giana Yoder. If you have any questions after today's visit, please call [phone].

## 2018-08-16 LAB
25(OH)D3+25(OH)D2 SERPL-MCNC: 66.9 NG/ML (ref 30–100)
ALBUMIN SERPL-MCNC: 4.1 G/DL (ref 3.5–4.7)
ALBUMIN/GLOB SERPL: 1.6 {RATIO} (ref 1.2–2.2)
ALP SERPL-CCNC: 35 IU/L (ref 39–117)
ALT SERPL-CCNC: 6 IU/L (ref 0–44)
AST SERPL-CCNC: 15 IU/L (ref 0–40)
BILIRUB SERPL-MCNC: 0.8 MG/DL (ref 0–1.2)
BUN SERPL-MCNC: 34 MG/DL (ref 8–27)
BUN/CREAT SERPL: 23 (ref 10–24)
CALCIUM SERPL-MCNC: 9.1 MG/DL (ref 8.6–10.2)
CHLORIDE SERPL-SCNC: 99 MMOL/L (ref 96–106)
CO2 SERPL-SCNC: 25 MMOL/L (ref 20–29)
CREAT SERPL-MCNC: 1.46 MG/DL (ref 0.76–1.27)
FERRITIN SERPL-MCNC: 513 NG/ML (ref 30–400)
GLOBULIN SER CALC-MCNC: 2.6 G/DL (ref 1.5–4.5)
GLUCOSE SERPL-MCNC: 59 MG/DL (ref 65–99)
IRON SATN MFR SERPL: 33 % (ref 15–55)
IRON SERPL-MCNC: 94 UG/DL (ref 38–169)
POTASSIUM SERPL-SCNC: 5 MMOL/L (ref 3.5–5.2)
PROT SERPL-MCNC: 6.7 G/DL (ref 6–8.5)
PSA SERPL-MCNC: 3.3 NG/ML (ref 0–4)
SODIUM SERPL-SCNC: 140 MMOL/L (ref 134–144)
TESTOST SERPL-MCNC: <3 NG/DL (ref 264–916)
TIBC SERPL-MCNC: 282 UG/DL (ref 250–450)
UIBC SERPL-MCNC: 188 UG/DL (ref 111–343)

## 2018-08-30 ENCOUNTER — HOSPITAL ENCOUNTER (OUTPATIENT)
Dept: INFUSION THERAPY | Age: 83
End: 2018-08-30

## 2018-09-10 ENCOUNTER — HOSPITAL ENCOUNTER (OUTPATIENT)
Dept: INFUSION THERAPY | Age: 83
Discharge: HOME | End: 2018-09-10
Payer: MEDICARE

## 2018-09-10 VITALS
SYSTOLIC BLOOD PRESSURE: 103 MMHG | HEART RATE: 56 BPM | OXYGEN SATURATION: 93 % | DIASTOLIC BLOOD PRESSURE: 56 MMHG | TEMPERATURE: 98.3 F | RESPIRATION RATE: 18 BRPM

## 2018-09-10 LAB
BASO+EOS+MONOS # BLD AUTO: 0.2 K/UL (ref 0–2.3)
BASO+EOS+MONOS # BLD AUTO: 9 % (ref 0.1–17)
DIFFERENTIAL METHOD BLD: ABNORMAL
ERYTHROCYTE [DISTWIDTH] IN BLOOD BY AUTOMATED COUNT: 20.3 % (ref 11.5–14.5)
HCT VFR BLD AUTO: 30 % (ref 36–48)
HGB BLD-MCNC: 8.6 G/DL (ref 12–16)
LYMPHOCYTES # BLD: 0.9 K/UL (ref 1.1–5.9)
LYMPHOCYTES NFR BLD: 44 % (ref 14–44)
MCH RBC QN AUTO: 25.1 PG (ref 25–35)
MCHC RBC AUTO-ENTMCNC: 28.7 G/DL (ref 31–37)
MCV RBC AUTO: 87.7 FL (ref 78–102)
NEUTS SEG # BLD: 1 K/UL (ref 1.8–9.5)
NEUTS SEG NFR BLD: 48 % (ref 40–70)
PLATELET # BLD AUTO: 105 K/UL (ref 135–420)
RBC # BLD AUTO: 3.42 M/UL (ref 4.1–5.1)
WBC # BLD AUTO: 2.1 K/UL (ref 4.5–13)

## 2018-09-10 PROCEDURE — 36415 COLL VENOUS BLD VENIPUNCTURE: CPT | Performed by: INTERNAL MEDICINE

## 2018-09-10 PROCEDURE — 96372 THER/PROPH/DIAG INJ SC/IM: CPT

## 2018-09-10 PROCEDURE — 36415 COLL VENOUS BLD VENIPUNCTURE: CPT

## 2018-09-10 PROCEDURE — 85049 AUTOMATED PLATELET COUNT: CPT | Performed by: INTERNAL MEDICINE

## 2018-09-10 PROCEDURE — 74011250636 HC RX REV CODE- 250/636: Performed by: INTERNAL MEDICINE

## 2018-09-10 PROCEDURE — 85025 COMPLETE CBC W/AUTO DIFF WBC: CPT | Performed by: INTERNAL MEDICINE

## 2018-09-10 RX ADMIN — ERYTHROPOIETIN 40000 UNITS: 40000 INJECTION, SOLUTION INTRAVENOUS; SUBCUTANEOUS at 09:47

## 2018-09-10 RX ADMIN — ERYTHROPOIETIN 20000 UNITS: 20000 INJECTION, SOLUTION INTRAVENOUS; SUBCUTANEOUS at 09:47

## 2018-09-10 NOTE — PROGRESS NOTES
[address] \[facility] [place] [facility]\"" Progress Note    Date: September 10, 2018    Name: Bucky Morrissey    MRN: [id number]         : 1932    Procrit/cbc Q 4 weeks    Mr. Saad Santana arrived to [facility]. No complaints or concerns voiced    Mr. Saad Santana was assessed and education was provided. Mr. Rosenda Torres vitals were reviewed. Visit Vitals    /56 (BP 1 Location: Left arm, BP Patient Position: Post activity)    Pulse (!) 56    Temp 98.3 °F (36.8 °C)    Resp 18    SpO2 93%       Blood drawn for labs via LEFT HAND venipuncture on the 2nd attempt. Lab results were obtained and reviewed. Recent Results (from the past 12 hour(s))   CBC WITH 3 PART DIFF    Collection Time: 09/10/18  9:26 AM   Result Value Ref Range    WBC 2.1 (L) 4.5 - 13.0 K/uL    RBC 3.42 (L) 4.10 - 5.10 M/uL    HGB 8.6 (L) 12.0 - 16 g/dL    HCT 30.0 (L) 36 - 48 %    MCV 87.7 78 - 102 FL    MCH 25.1 25.0 - 35.0 PG    MCHC 28.7 (L) 31 - 37 g/dL    RDW 20.3 (H) 11.5 - 14.5 %    NEUTROPHILS 48 40 - 70 %    MIXED CELLS 9 0.1 - 17 %    LYMPHOCYTES 44 14 - 44 %    ABS. NEUTROPHILS 1.0 (L) 1.8 - 9.5 K/UL    ABS. MIXED CELLS 0.2 0.0 - 2.3 K/uL    ABS. LYMPHOCYTES 0.9 (L) 1.1 - 5.9 K/UL    DF AUTOMATED     PLATELET COUNT    Collection Time: 09/10/18  9:26 AM   Result Value Ref Range    PLATELET 174 (L) 263 - 420 K/uL       Procrit injection 60,000 units given SQ to upper back of left arm for H/H . Tolerated well. Bandaid to site    Mr. Saad Santana tolerated well without complaints. Mr. Saad Santana was discharged from [place] [address] in stable condition at [phone]. He is to return on 10/8/18 at 0930 for his next appointment.     Humera Shepard RN  September 10, 2018

## 2018-09-27 ENCOUNTER — APPOINTMENT (OUTPATIENT)
Dept: INFUSION THERAPY | Age: 83
End: 2018-09-27
Payer: MEDICARE

## 2018-11-26 ENCOUNTER — OFFICE VISIT (OUTPATIENT)
Dept: ONCOLOGY | Age: 83
End: 2018-11-26

## 2018-11-26 ENCOUNTER — HOSPITAL ENCOUNTER (OUTPATIENT)
Dept: INFUSION THERAPY | Age: 83
Discharge: HOME | End: 2018-11-26
Payer: MEDICARE

## 2018-11-26 ENCOUNTER — HOSPITAL ENCOUNTER (OUTPATIENT)
Dept: ONCOLOGY | Age: 83
Discharge: HOME | End: 2018-11-26

## 2018-11-26 VITALS
SYSTOLIC BLOOD PRESSURE: 123 MMHG | DIASTOLIC BLOOD PRESSURE: 75 MMHG | RESPIRATION RATE: 18 BRPM | HEART RATE: 83 BPM | OXYGEN SATURATION: 96 % | TEMPERATURE: 98.6 F

## 2018-11-26 VITALS
HEIGHT: 72 IN | WEIGHT: 286 LBS | OXYGEN SATURATION: 97 % | SYSTOLIC BLOOD PRESSURE: 107 MMHG | RESPIRATION RATE: 18 BRPM | DIASTOLIC BLOOD PRESSURE: 59 MMHG | BODY MASS INDEX: 38.74 KG/M2 | HEART RATE: 69 BPM | TEMPERATURE: 97.7 F

## 2018-11-26 DIAGNOSIS — C61 PROSTATE CANCER (HCC): ICD-10-CM

## 2018-11-26 DIAGNOSIS — D69.6 THROMBOCYTOPENIA (HCC): ICD-10-CM

## 2018-11-26 DIAGNOSIS — N18.4 ANEMIA DUE TO STAGE 4 CHRONIC KIDNEY DISEASE (HCC): ICD-10-CM

## 2018-11-26 DIAGNOSIS — D63.1 ANEMIA DUE TO STAGE 4 CHRONIC KIDNEY DISEASE (HCC): ICD-10-CM

## 2018-11-26 DIAGNOSIS — E55.9 VITAMIN D DEFICIENCY: ICD-10-CM

## 2018-11-26 DIAGNOSIS — D69.6 THROMBOCYTOPENIA (HCC): Primary | ICD-10-CM

## 2018-11-26 DIAGNOSIS — C67.4 MALIGNANT NEOPLASM OF POSTERIOR WALL OF URINARY BLADDER (HCC): ICD-10-CM

## 2018-11-26 LAB
BASO+EOS+MONOS # BLD AUTO: 0.1 K/UL (ref 0–2.3)
BASO+EOS+MONOS # BLD AUTO: 10 % (ref 0.1–17)
DIFFERENTIAL METHOD BLD: ABNORMAL
ERYTHROCYTE [DISTWIDTH] IN BLOOD BY AUTOMATED COUNT: 19.1 % (ref 11.5–14.5)
HCT VFR BLD AUTO: 26.2 % (ref 36–48)
HGB BLD-MCNC: 7.3 G/DL (ref 12–16)
LYMPHOCYTES # BLD: 0.7 K/UL (ref 1.1–5.9)
LYMPHOCYTES NFR BLD: 46 % (ref 14–44)
MCH RBC QN AUTO: 23.9 PG (ref 25–35)
MCHC RBC AUTO-ENTMCNC: 27.9 G/DL (ref 31–37)
MCV RBC AUTO: 85.9 FL (ref 78–102)
NEUTS SEG # BLD: 0.7 K/UL (ref 1.8–9.5)
NEUTS SEG NFR BLD: 45 % (ref 40–70)
PLATELET # BLD AUTO: 74 K/UL (ref 135–420)
RBC # BLD AUTO: 3.05 M/UL (ref 4.1–5.1)
WBC # BLD AUTO: 1.5 K/UL (ref 4.5–13)

## 2018-11-26 PROCEDURE — 96372 THER/PROPH/DIAG INJ SC/IM: CPT

## 2018-11-26 PROCEDURE — 74011250636 HC RX REV CODE- 250/636: Performed by: INTERNAL MEDICINE

## 2018-11-26 RX ADMIN — ERYTHROPOIETIN 20000 UNITS: 20000 INJECTION, SOLUTION INTRAVENOUS; SUBCUTANEOUS at 12:26

## 2018-11-26 RX ADMIN — ERYTHROPOIETIN 40000 UNITS: 40000 INJECTION, SOLUTION INTRAVENOUS; SUBCUTANEOUS at 12:26

## 2018-11-26 NOTE — PROGRESS NOTES
[facility] Progress Note Date: 2018 Name: Noam Ramirez. MRN: [id number] : 1932 Procrit/cbc Q 4 weeks Mr. Cherri Crespo arrived to [facility] at [phone] via wheel chair, accompanied by his daughter. Patient had office visit today, and cbc was done. Patient to be given procrit today per Dr Daniel Bolton. Patient states he was recently discharged from the hospital. He states he is weak, tired and fatigued. No complaints of pain and no acute distress noted Mr. Cherri Crespo was assessed and education was provided. Mr. Roxane De La O vitals were reviewed. Visit Vitals /75 (BP 1 Location: Left arm, BP Patient Position: Sitting) Pulse 83 Temp 98.6 °F (37 °C) Resp 18 SpO2 96% Lab results were obtained and reviewed. Recent Results (from the past 12 hour(s)) CBC WITH 3 PART DIFF Collection Time: 18 11:28 AM  
Result Value Ref Range WBC 1.5 (L) 4.5 - 13.0 K/uL  
 RBC 3.05 (L) 4.10 - 5.10 M/uL HGB 7.3 (L) 12.0 - 16 g/dL HCT 26.2 (L) 36 - 48 % MCV 85.9 78 - 102 FL  
 MCH 23.9 (L) 25.0 - 35.0 PG  
 MCHC 27.9 (L) 31 - 37 g/dL  
 RDW 19.1 (H) 11.5 - 14.5 % NEUTROPHILS 45 40 - 70 % MIXED CELLS 10 0.1 - 17 % LYMPHOCYTES 46 (H) 14 - 44 % ABS. NEUTROPHILS 0.7 (L) 1.8 - 9.5 K/UL  
 ABS. MIXED CELLS 0.1 0.0 - 2.3 K/uL  
 ABS. LYMPHOCYTES 0.7 (L) 1.1 - 5.9 K/UL  
 DF AUTOMATED Procrit injection 60,000 units given SQ to upper back of right arm per parameters . Tolerated well. Bandaid to site Mr. Matta tolerated well without complaints. Mr. Cherri Crespo was discharged from [place] [address] in stable condition at 1230. He is to return on 18  for his next appointment. Shruthi Smith RN 2018

## 2018-11-26 NOTE — PROGRESS NOTES
Hematology/Oncology  Progress Note    Name: Abhilash Hayden. Date: 2018  : 1932    PCP: Warren Neff NP     Mr. Rachel Darnell is a 80 y.o. man who is seen for myriad medical problems including myelodysplastic syndrome and chronic anemia. Current therapy: Procrit 60,000 units every 2 weeks whenever the hemoglobin is below 10 g/dL and hematocrit is below 30%. Subjective:     Mr. Rachel Darnell is an 80-year-old -American man who has a long-standing history of myelodysplastic syndrome. He also has chronic renal insufficiency and thus chronic anemia. The patient has long-standing arthritic discomfort in his joints. He is currently using a wheelchair for mobility support. The patient reports that he was recently hospitalized with congestive heart failure. He is having more difficulty standing and ambulating. Therefore he is using a wheelchair. Past medical history, family history, and social history: these were reviewed and remains unchanged.     Past Medical History:   Diagnosis Date    Arthritis     Bladder cancer ([facility]     Diabetes ([facility]     INsulin dependent    Gout     Myelodysplastic syndrome, unspecified ([facility]     Personal history of prostate cancer     Renal cyst      Past Surgical History:   Procedure Laterality Date    HX BACK SURGERY      HX CATARACT REMOVAL      HX HERNIA REPAIR      HX KNEE REPLACEMENT       Social History     Socioeconomic History    Marital status:      Spouse name: Not on file    Number of children: Not on file    Years of education: Not on file    Highest education level: Not on file   Social Needs    Financial resource strain: Not on file    Food insecurity - worry: Not on file    Food insecurity - inability: Not on file   Sinhala [employer] needs - medical: Not on file   [employer] needs - non-medical: Not on file   Occupational History    Not on file   Tobacco Use    Smoking status: Former Smoker     Types: Cigarettes     Last attempt to quit: 1994     Years since quittin.5    Smokeless tobacco: Never Used   Substance and Sexual Activity    Alcohol use: No    Drug use: No    Sexual activity: Not on file   Other Topics Concern    Not on file   Social History Narrative    Not on file     Family History   Problem Relation Age of Onset    Diabetes Mother     Stroke Mother      Current Outpatient Medications   Medication Sig Dispense Refill    amoxicillin-clavulanate (AUGMENTIN) 875-125 mg per tablet TAKE 1 TABLET BY MOUTH EVERY 12 HOURS FOR 4 DAYS WITH FOOD OR MILK  0    VITAMIN C 250 mg tablet TAKE 1 TABLET BY MOUTH ONCE DAILY. TAKE ON EMPTY STOMACH WITH FERROUS SULFATE  0    colchicine 0.6 mg tablet take 1 tablet by mouth once daily  0    ferrous sulfate 325 mg (65 mg iron) tablet TAKE 1 TABLET BY MOUTH ONCE DAILY WITH BREAKFAST. TAKE WITH VITAMIN C ON EMPTY STOMACH  0    Insulin Syringes, Disposable, 1 mL syrg BD INsulin syringes with the BD Ultra Fine needle-8 mm/31 gauge      ACCU-CHEK MULTICLIX LANCET misc   0    lidocaine (SALONPAS/ASPERCREME) 4 % patch Apply 1 Patch to affected area.  lidocaine (LIDODERM) 5 % 1 Patch.  naproxen (NAPROSYN) 500 mg tablet TAKE 1 TABLET BY MOUTH EVERY 12 HOURS AS NEEDED FOR PAIN FOR UP TO 30 DAYS  0    ergocalciferol (VITAMIN D2) 50,000 unit capsule Take 1 Cap by mouth every seven (7) days. Indications: Vitamin D Deficiency 12 Cap 0    allopurinol (ZYLOPRIM) 100 mg tablet Take  by mouth daily.  carvedilol (COREG) 25 mg tablet take 1 tablet by mouth twice a day  0    furosemide (LASIX) 40 mg tablet Take  by mouth daily.  traMADol (ULTRAM) 50 mg tablet Take 50 mg by mouth every six (6) hours as needed for Pain.  amLODIPine (NORVASC) 5 mg tablet Take 5 mg by mouth daily.  insulin glargine (LANTUS) 100 unit/mL injection by SubCUTAneous route nightly.  Cholecalciferol, Vitamin D3, 3,000 unit tab Take 6,000 Units by mouth.       aspirin delayed-release 81 mg tablet Take  by mouth daily.  isosorbide mononitrate ER (IMDUR) 30 mg tablet Take  by mouth daily.  TRUETEST TEST STRIPS strip       pioglitazone (ACTOS) 15 mg tablet       nitroglycerin (NITROSTAT) 0.4 mg SL tablet by SubLINGual route every five (5) minutes as needed.  simvastatin (ZOCOR) 40 mg tablet Take  by mouth nightly.  gabapentin (NEURONTIN) 300 mg capsule Take 300 mg by mouth three (3) times daily. Review of Systems  Constitutional: The patient has no acute distress or discomfort. HEENT: The patient denies recent head trauma, eye pain, blurred vision,  hearing deficit, oropharyngeal mucosal pain or lesions, and the patient denies throat pain or discomfort. Lymphatics: The patient denies palpable peripheral lymphadenopathy. Hematologic: The patient denies having bruising, bleeding, or progressive fatigue. Respiratory: Patient denies having shortness of breath, cough, sputum production, fever, or dyspnea on exertion. Cardiovascular: The patient denies having leg pain, leg swelling, heart palpitations, chest permit, chest pain, or lightheadedness. The patient denies having dyspnea on exertion. Gastrointestinal: The patient denies having nausea, emesis, or diarrhea. The patient denies having any hematemesis or blood in the stool. Genitourinary: Patient denies having urinary urgency, frequency, or dysuria. The patient denies having blood in the urine. Psychological: The patient denies having symptoms of nervousness, anxiety, depression, or thoughts of harming self. Skin: Patient denies having skin rashes, skin, ulcerations, or unexplained itching or pruritus. Musculoskeletal: The patient denies having pain in the joints or bones. Objective:     Visit Vitals  /59   Pulse 69   Temp 97.7 °F (36.5 °C) (Oral)   Resp 18   Ht 6' (1.829 m)   Wt 129.7 kg (286 lb)   SpO2 97%   BMI 38.79 kg/m²     ECOG PS=1; pain score=2/10    Physical Exam:   Gen. Appearance:  The patient is in no acute distress. Skin: There is no bruise or rash. HEENT: The exam is unremarkable. Neck: Supple without lymphadenopathy or thyromegaly. Lungs: Clear to auscultation and percussion; there are no wheezes or rhonchi. Heart: Regular rate and rhythm; there are no murmurs, gallops, or rubs. Abdomen: Bowel sounds are present and normal.  There is no guarding, tenderness, or hepatosplenomegaly. Extremities: There is no clubbing, cyanosis, or edema. Neurologic: There are no focal neurologic deficits. Lymphatics: There is no palpable peripheral lymphadenopathy. Musculoskeletal: The patient has full range of motion at all joints. There is no evidence of joint deformity or effusions. There is no focal joint tenderness. Psychological/psychiatric: There is no clinical evidence of anxiety, depression, or melancholy. Lab data:      Results for orders placed or performed during the hospital encounter of 11/26/18   CBC WITH 3 PART DIFF     Status: Abnormal   Result Value Ref Range Status    WBC 1.5 (L) 4.5 - 13.0 K/uL Final    RBC 3.05 (L) 4.10 - 5.10 M/uL Final    HGB 7.3 (L) 12.0 - 16 g/dL Final    HCT 26.2 (L) 36 - 48 % Final    MCV 85.9 78 - 102 FL Final    MCH 23.9 (L) 25.0 - 35.0 PG Final    MCHC 27.9 (L) 31 - 37 g/dL Final    RDW 19.1 (H) 11.5 - 14.5 % Final    NEUTROPHILS 45 40 - 70 % Final    MIXED CELLS 10 0.1 - 17 % Final    LYMPHOCYTES 46 (H) 14 - 44 % Final    ABS. NEUTROPHILS 0.7 (L) 1.8 - 9.5 K/UL Final    ABS. MIXED CELLS 0.1 0.0 - 2.3 K/uL Final    ABS. LYMPHOCYTES 0.7 (L) 1.1 - 5.9 K/UL Final     Comment: Test performed at [address] Results Reviewed by Medical Director. DF AUTOMATED   Final           Assessment:     1. Thrombocytopenia ([facility]    2. Vitamin D deficiency    3. Prostate cancer ([facility]    4. Malignant neoplasm of posterior wall of urinary bladder (HCC)    5.  Anemia due to stage 4 chronic kidney disease ([address]      Plan:   Myelodysplastic syndrome: I have explained to the patient that his CBC today reveals that his WBC 1.5, the hemoglobin is 7.3 g/dL, and hematocrit is 26.2%. The current platelet count is 031,157. Sosa Homar We will continue to monitor his CBC at 3 month intervals. I will contact the infusion center to see if he will be able to get his Procrit injection today. Procrit will be resumed every 4 weeks whenever the hemoglobin is below 10 g/dL and hematocrit is below 30%. An iron profile and ferritin level will be ordered along with a comprehensive metabolic panel. Leukopenia: The current CBC shows a decline of WBC count of 1.5 and the absolute neutrophil count is 0.7. I have explained to the patient that if his absolute neutrophil count declines below 0.5 we will start him on treatment with Neupogen. Pt denies any fevers, unexplained infections or rash. .     Thrombocytopenia: The current CBC shows that his platelet counts are relatively stable at 100,000. Therapeutic intervention is not necessary unless the platelet counts decline below 30,000. History of prostate cancer: The patient is clinically stable. The patient continues to receive Eligard every 4 months. On 03/05/2018 his PSA was 4.4ng/mL. I will order a PSA level today. History of bladder cancer:  Patient is s/p for a urological procedure on 12/2/2016 by Dr. Beverly Ganser. He continues to have routine follow-up appts with the urologist.    Chronic arthritis: The patient is using Tramadol which provides adequate relief. This will continue. I will have the patient return in 3 months for a complete assessment or sooner if indicated.   Orders Placed This Encounter    COMPLETE CBC & AUTO DIFF WBC    METABOLIC PANEL, COMPREHENSIVE     Standing Status:   Future     Number of Occurrences:   1     Standing Expiration Date:   11/27/2019    IRON PROFILE     Standing Status:   Future     Number of Occurrences:   1     Standing Expiration Date:   11/27/2019    FERRITIN     Standing Status:   Future     Number of Occurrences:   1     Standing Expiration Date:   11/27/2019    VITAMIN D, 25 HYDROXY     Standing Status:   Future     Number of Occurrences:   1     Standing Expiration Date:   11/27/2019    PROSTATE SPECIFIC AG     Standing Status:   Future     Number of Occurrences:   1     Standing Expiration Date:   11/27/2019    InHouse CBC ([employer])     Standing Status:   Future     Number of Occurrences:   1     Standing Expiration Date:   12/3/2018       Cary Rubi MD  11/26/2018         Please note: This document has been produced using voice recognition software. Unrecognized errors in transcription may be present.

## 2018-11-26 NOTE — PATIENT INSTRUCTIONS
Anemia From Chronic Disease: Care Instructions  Your Care Instructions    Anemia is a low level of red blood cells. Red blood cells carry oxygen from your lungs to the rest of your body. Sometimes when you have a long-term (chronic) disease, such as kidney disease, arthritis, diabetes, cancer, or an infection, your body does not make enough red blood cells. Follow-up care is a key part of your treatment and safety. Be sure to make and go to all appointments, and call your doctor if you are having problems. It's also a good idea to know your test results and keep a list of the medicines you take. How can you care for yourself at home? · Follow your doctor's instructions to treat the chronic condition that is causing the anemia. · Be safe with medicines. Take your medicine to treat your chronic condition exactly as prescribed. Call your doctor if you think you are having a problem with your medicine. · Take your medicine for anemia exactly as prescribed. Call your doctor if you think you are having a problem with your medicine. Medicines to increase the number of red blood cells (such as epoetin or darbepoetin) may be given as an injection. ? If you miss a dose, take it as soon as you can, unless it is almost time for your next dose. In that case, get back on your regular schedule and take only one dose. ? Do not freeze this medicine. Store it in the refrigerator. Do not shake the bottle before you prepare the shot. · Keep all your appointments for blood tests to check on your hemoglobin levels. When should you call for help? Call 911 anytime you think you may need emergency care.  For example, call if:    · You passed out (lost consciousness).    Call your doctor now or seek immediate medical care if:    · You are short of breath.     · You are dizzy or light-headed, or you feel like you may faint.     · You have new or worse bleeding.    Watch closely for changes in your health, and be sure to contact your doctor if:    · You feel weaker or more tired than usual.     · You do not get better as expected. Where can you learn more? Go to http://glenna-toby.info/. Enter E502 in the search box to learn more about \"Anemia From Chronic Disease: Care Instructions. \"  Current as of: May 7, 2018  Content Version: 11.8  © 2694-5979 [employer]. Care instructions adapted under license by [employer] (which disclaims liability or warranty for this information). If you have questions about a medical condition or this instruction, always ask your healthcare professional. [place] [address] any warranty or liability for your use of this information.

## 2018-11-27 NOTE — PROGRESS NOTES
Patient received Procrit 60,000 SQ on 11/26/2018.  CBC every 4 weeks and Procrit will be administered for H/H below 10&30.0

## 2018-11-28 LAB
25(OH)D3+25(OH)D2 SERPL-MCNC: 95.7 NG/ML (ref 30–100)
ALBUMIN SERPL-MCNC: 4 G/DL (ref 3.5–4.7)
ALBUMIN/GLOB SERPL: 1.7 {RATIO} (ref 1.2–2.2)
ALP SERPL-CCNC: 32 IU/L (ref 39–117)
ALT SERPL-CCNC: 6 IU/L (ref 0–44)
AST SERPL-CCNC: 17 IU/L (ref 0–40)
BILIRUB SERPL-MCNC: 0.8 MG/DL (ref 0–1.2)
BUN SERPL-MCNC: 36 MG/DL (ref 8–27)
BUN/CREAT SERPL: 22 (ref 10–24)
CALCIUM SERPL-MCNC: 8.6 MG/DL (ref 8.6–10.2)
CHLORIDE SERPL-SCNC: 102 MMOL/L (ref 96–106)
CO2 SERPL-SCNC: 28 MMOL/L (ref 20–29)
CREAT SERPL-MCNC: 1.62 MG/DL (ref 0.76–1.27)
FERRITIN SERPL-MCNC: 512 NG/ML (ref 30–400)
GLOBULIN SER CALC-MCNC: 2.4 G/DL (ref 1.5–4.5)
GLUCOSE SERPL-MCNC: 130 MG/DL (ref 65–99)
IRON SATN MFR SERPL: 37 % (ref 15–55)
IRON SERPL-MCNC: 97 UG/DL (ref 38–169)
POTASSIUM SERPL-SCNC: 4.5 MMOL/L (ref 3.5–5.2)
PROT SERPL-MCNC: 6.4 G/DL (ref 6–8.5)
PSA SERPL-MCNC: 2.6 NG/ML (ref 0–4)
SODIUM SERPL-SCNC: 144 MMOL/L (ref 134–144)
TIBC SERPL-MCNC: 261 UG/DL (ref 250–450)
UIBC SERPL-MCNC: 164 UG/DL (ref 111–343)

## 2018-12-03 ENCOUNTER — HOSPITAL ENCOUNTER (OUTPATIENT)
Dept: INFUSION THERAPY | Age: 83
Discharge: HOME | End: 2018-12-03
Payer: MEDICARE

## 2018-12-03 VITALS
SYSTOLIC BLOOD PRESSURE: 128 MMHG | TEMPERATURE: 98.4 F | OXYGEN SATURATION: 94 % | HEART RATE: 64 BPM | DIASTOLIC BLOOD PRESSURE: 68 MMHG | RESPIRATION RATE: 18 BRPM

## 2018-12-03 LAB
BASO+EOS+MONOS # BLD AUTO: 0.3 K/UL (ref 0–2.3)
BASO+EOS+MONOS # BLD AUTO: 10 % (ref 0.1–17)
DIFFERENTIAL METHOD BLD: ABNORMAL
ERYTHROCYTE [DISTWIDTH] IN BLOOD BY AUTOMATED COUNT: 22.1 % (ref 11.5–14.5)
HCT VFR BLD AUTO: 28.5 % (ref 36–48)
HGB BLD-MCNC: 8.1 G/DL (ref 12–16)
LYMPHOCYTES # BLD: 1.2 K/UL (ref 1.1–5.9)
LYMPHOCYTES NFR BLD: 45 % (ref 14–44)
MCH RBC QN AUTO: 24.6 PG (ref 25–35)
MCHC RBC AUTO-ENTMCNC: 28.4 G/DL (ref 31–37)
MCV RBC AUTO: 86.6 FL (ref 78–102)
NEUTS SEG # BLD: 1.1 K/UL (ref 1.8–9.5)
NEUTS SEG NFR BLD: 46 % (ref 40–70)
PLATELET # BLD AUTO: 132 K/UL (ref 135–420)
RBC # BLD AUTO: 3.29 M/UL (ref 4.1–5.1)
WBC # BLD AUTO: 2.6 K/UL (ref 4.5–13)

## 2018-12-03 PROCEDURE — 85049 AUTOMATED PLATELET COUNT: CPT

## 2018-12-03 PROCEDURE — 85025 COMPLETE CBC W/AUTO DIFF WBC: CPT

## 2018-12-03 PROCEDURE — 96372 THER/PROPH/DIAG INJ SC/IM: CPT

## 2018-12-03 PROCEDURE — 74011250636 HC RX REV CODE- 250/636: Performed by: INTERNAL MEDICINE

## 2018-12-03 PROCEDURE — 36415 COLL VENOUS BLD VENIPUNCTURE: CPT

## 2018-12-03 RX ADMIN — ERYTHROPOIETIN 20000 UNITS: 20000 INJECTION, SOLUTION INTRAVENOUS; SUBCUTANEOUS at 09:48

## 2018-12-03 RX ADMIN — ERYTHROPOIETIN 40000 UNITS: 40000 INJECTION, SOLUTION INTRAVENOUS; SUBCUTANEOUS at 09:48

## 2018-12-03 NOTE — PROGRESS NOTES
[facility] Progress Note Date: December 3, 2018 Name: Toni Salguero. MRN: [id number] : 1932 Procrit/cbc Q 4 weeks Mr. Odilia Schwarz arrived to [facility] at 9789 ambulatory. No complaints or concerns voiced Mr. Odilia Schwarz was assessed and education was provided. Mr. Marisa Mata vitals were reviewed. Visit Vitals /68 (BP 1 Location: Right arm, BP Patient Position: Sitting) Pulse 64 Temp 98.4 °F (36.9 °C) Resp 18 SpO2 94% Blood drawn for labs via LEFT AC venipuncture on the 1st attempt. Lab results were obtained and reviewed. Recent Results (from the past 12 hour(s)) CBC WITH 3 PART DIFF Collection Time: 18  9:30 AM  
Result Value Ref Range WBC 2.6 (L) 4.5 - 13.0 K/uL  
 RBC 3.29 (L) 4.10 - 5.10 M/uL HGB 8.1 (L) 12.0 - 16 g/dL HCT 28.5 (L) 36 - 48 % MCV 86.6 78 - 102 FL  
 MCH 24.6 (L) 25.0 - 35.0 PG  
 MCHC 28.4 (L) 31 - 37 g/dL RDW 22.1 (H) 11.5 - 14.5 % NEUTROPHILS 46 40 - 70 % MIXED CELLS 10 0.1 - 17 % LYMPHOCYTES 45 (H) 14 - 44 % ABS. NEUTROPHILS 1.1 (L) 1.8 - 9.5 K/UL  
 ABS. MIXED CELLS 0.3 0.0 - 2.3 K/uL  
 ABS. LYMPHOCYTES 1.2 1.1 - 5.9 K/UL  
 DF AUTOMATED Procrit injection 60,000 units given SQ to upper back of left arm per parameters. Tolerated well. Bandaid to site Mr. Matta tolerated well without complaints. Mr. Odilia Schwarz was discharged from [place] [address] in stable condition at 2945. He is to return on 18 at 0930 for his next appointment. Ariadna Hayes RN December 3, 2018

## 2018-12-31 ENCOUNTER — HOSPITAL ENCOUNTER (OUTPATIENT)
Dept: INFUSION THERAPY | Age: 83
End: 2018-12-31
Payer: MEDICARE

## 2019-01-02 ENCOUNTER — HOSPITAL ENCOUNTER (OUTPATIENT)
Dept: INFUSION THERAPY | Age: 84
Discharge: HOME | End: 2019-01-02
Payer: MEDICARE

## 2019-01-02 VITALS
TEMPERATURE: 98.3 F | OXYGEN SATURATION: 93 % | SYSTOLIC BLOOD PRESSURE: 105 MMHG | RESPIRATION RATE: 18 BRPM | HEART RATE: 60 BPM | DIASTOLIC BLOOD PRESSURE: 65 MMHG

## 2019-01-02 LAB
BASO+EOS+MONOS # BLD AUTO: 0.1 K/UL (ref 0–2.3)
BASO+EOS+MONOS NFR BLD AUTO: 7 % (ref 0.1–17)
DIFFERENTIAL METHOD BLD: ABNORMAL
ERYTHROCYTE [DISTWIDTH] IN BLOOD BY AUTOMATED COUNT: 20 % (ref 11.5–14.5)
HCT VFR BLD AUTO: 28.3 % (ref 36–48)
HGB BLD-MCNC: 8 G/DL (ref 12–16)
LYMPHOCYTES # BLD: 0.8 K/UL (ref 1.1–5.9)
LYMPHOCYTES NFR BLD: 50 % (ref 14–44)
MCH RBC QN AUTO: 23.9 PG (ref 25–35)
MCHC RBC AUTO-ENTMCNC: 28.3 G/DL (ref 31–37)
MCV RBC AUTO: 84.5 FL (ref 78–102)
NEUTS SEG # BLD: 0.7 K/UL (ref 1.8–9.5)
NEUTS SEG NFR BLD: 43 % (ref 40–70)
PLATELET # BLD AUTO: 201 K/UL (ref 140–440)
RBC # BLD AUTO: 3.35 M/UL (ref 4.1–5.1)
WBC # BLD AUTO: 1.6 K/UL (ref 4.5–13)

## 2019-01-02 PROCEDURE — 85049 AUTOMATED PLATELET COUNT: CPT

## 2019-01-02 PROCEDURE — 74011250636 HC RX REV CODE- 250/636: Performed by: INTERNAL MEDICINE

## 2019-01-02 PROCEDURE — 36415 COLL VENOUS BLD VENIPUNCTURE: CPT

## 2019-01-02 PROCEDURE — 85025 COMPLETE CBC W/AUTO DIFF WBC: CPT

## 2019-01-02 PROCEDURE — 96372 THER/PROPH/DIAG INJ SC/IM: CPT

## 2019-01-02 RX ADMIN — ERYTHROPOIETIN 20000 UNITS: 20000 INJECTION, SOLUTION INTRAVENOUS; SUBCUTANEOUS at 13:05

## 2019-01-02 RX ADMIN — ERYTHROPOIETIN 40000 UNITS: 40000 INJECTION, SOLUTION INTRAVENOUS; SUBCUTANEOUS at 13:05

## 2019-01-02 NOTE — PROGRESS NOTES
[facility] Progress Note Date: 2019 Name: Mirta Olivas MRN: [id number] : 1932 Procrit/cbc Q 4 weeks Mr. José Luis Boss arrived to [facility] at [phone] ambulatory for rescheduled visit. No complaints or concerns voiced Mr. José Luis Boss was assessed and education was provided. Mr. Darry Landau vitals were reviewed. Visit Vitals /65 (BP 1 Location: Left arm, BP Patient Position: Post activity) Pulse 60 Temp 98.3 °F (36.8 °C) Resp 18 SpO2 93% Blood drawn for labs via right  FA venipuncture on the 2nd attempt. Lab results were obtained and reviewed. Recent Results (from the past 12 hour(s)) AMB POC URINALYSIS DIP STICK AUTO W/O MICRO Collection Time: 19 10:49 AM  
Result Value Ref Range Color (UA POC) Yellow Clarity (UA POC) Clear Glucose (UA POC) Negative Negative Bilirubin (UA POC) Negative Negative Ketones (UA POC) Negative Negative Specific gravity (UA POC) 1.010 1.001 - 1.035 Blood (UA POC) 3+ Negative pH (UA POC) 6.0 4.6 - 8.0 Protein (UA POC) Negative Negative Urobilinogen (UA POC) 0.2 mg/dL 0.2 - 1 Nitrites (UA POC) Negative Negative Leukocyte esterase (UA POC) Negative Negative CBC WITH 3 PART DIFF Collection Time: 19 12:50 PM  
Result Value Ref Range WBC 1.6 (L) 4.5 - 13.0 K/uL  
 RBC 3.35 (L) 4.10 - 5.10 M/uL HGB 8.0 (L) 12.0 - 16 g/dL HCT 28.3 (L) 36 - 48 % MCV 84.5 78 - 102 FL  
 MCH 23.9 (L) 25.0 - 35.0 PG  
 MCHC 28.3 (L) 31 - 37 g/dL RDW 20.0 (H) 11.5 - 14.5 % NEUTROPHILS 43 40 - 70 % MIXED CELLS 7 0.1 - 17 % LYMPHOCYTES 50 (H) 14 - 44 % ABS. NEUTROPHILS 0.7 (L) 1.8 - 9.5 K/UL  
 ABS. MIXED CELLS 0.1 0.0 - 2.3 K/uL  
 ABS. LYMPHOCYTES 0.8 (L) 1.1 - 5.9 K/UL  
 DF AUTOMATED PLATELET COUNT Collection Time: 19 12:50 PM  
Result Value Ref Range PLATELET 144 885 - 900 K/uL Procrit injection 60,000 units given SQ to upper back of right arm per parameters. Tolerated well. Bandaid to site Mr. Matta tolerated well without complaints. Mr. Rikki Villegas was discharged from [place] [address] in stable condition at 1310. He is to return on 1/30/19 at 1130 for his next appointment. Eddie Sharp RN 
January 2, 2019

## 2019-01-28 ENCOUNTER — APPOINTMENT (OUTPATIENT)
Dept: INFUSION THERAPY | Age: 84
End: 2019-01-28
Payer: MEDICARE

## 2019-01-30 ENCOUNTER — HOSPITAL ENCOUNTER (OUTPATIENT)
Dept: INFUSION THERAPY | Age: 84
End: 2019-01-30
Payer: MEDICARE

## 2019-02-04 ENCOUNTER — HOSPITAL ENCOUNTER (OUTPATIENT)
Dept: ONCOLOGY | Age: 84
Discharge: HOME | End: 2019-02-04

## 2019-02-04 ENCOUNTER — HOSPITAL ENCOUNTER (OUTPATIENT)
Dept: INFUSION THERAPY | Age: 84
Discharge: HOME | End: 2019-02-04
Payer: MEDICARE

## 2019-02-04 ENCOUNTER — OFFICE VISIT (OUTPATIENT)
Dept: ONCOLOGY | Age: 84
End: 2019-02-04

## 2019-02-04 ENCOUNTER — HOSPITAL ENCOUNTER (OUTPATIENT)
Dept: LAB | Age: 84
Discharge: HOME | End: 2019-02-04

## 2019-02-04 VITALS
SYSTOLIC BLOOD PRESSURE: 119 MMHG | HEIGHT: 72 IN | TEMPERATURE: 97.9 F | OXYGEN SATURATION: 100 % | DIASTOLIC BLOOD PRESSURE: 64 MMHG | RESPIRATION RATE: 18 BRPM | HEART RATE: 72 BPM | BODY MASS INDEX: 35.35 KG/M2 | WEIGHT: 261 LBS

## 2019-02-04 DIAGNOSIS — N18.4 ANEMIA DUE TO STAGE 4 CHRONIC KIDNEY DISEASE (HCC): ICD-10-CM

## 2019-02-04 DIAGNOSIS — E55.9 VITAMIN D DEFICIENCY: ICD-10-CM

## 2019-02-04 DIAGNOSIS — D63.1 ANEMIA DUE TO STAGE 4 CHRONIC KIDNEY DISEASE (HCC): ICD-10-CM

## 2019-02-04 DIAGNOSIS — D69.6 THROMBOCYTOPENIA (HCC): Primary | ICD-10-CM

## 2019-02-04 DIAGNOSIS — D46.9 MYELODYSPLASTIC SYNDROME (HCC): ICD-10-CM

## 2019-02-04 DIAGNOSIS — D69.6 THROMBOCYTOPENIA (HCC): ICD-10-CM

## 2019-02-04 LAB
BASO+EOS+MONOS # BLD AUTO: 0.2 K/UL (ref 0–2.3)
BASO+EOS+MONOS NFR BLD AUTO: 10 % (ref 0.1–17)
DIFFERENTIAL METHOD BLD: ABNORMAL
ERYTHROCYTE [DISTWIDTH] IN BLOOD BY AUTOMATED COUNT: 19.5 % (ref 11.5–14.5)
HCT VFR BLD AUTO: 28.1 % (ref 36–48)
HGB BLD-MCNC: 8.1 G/DL (ref 12–16)
LYMPHOCYTES # BLD: 0.8 K/UL (ref 1.1–5.9)
LYMPHOCYTES NFR BLD: 43 % (ref 14–44)
MCH RBC QN AUTO: 24.2 PG (ref 25–35)
MCHC RBC AUTO-ENTMCNC: 28.8 G/DL (ref 31–37)
MCV RBC AUTO: 83.9 FL (ref 78–102)
NEUTS SEG # BLD: 0.9 K/UL (ref 1.8–9.5)
NEUTS SEG NFR BLD: 47 % (ref 40–70)
PLATELET # BLD AUTO: 174 K/UL (ref 140–440)
RBC # BLD AUTO: 3.35 M/UL (ref 4.1–5.1)
WBC # BLD AUTO: 1.9 K/UL (ref 4.5–13)

## 2019-02-04 PROCEDURE — 96372 THER/PROPH/DIAG INJ SC/IM: CPT

## 2019-02-04 PROCEDURE — 99001 SPECIMEN HANDLING PT-LAB: CPT

## 2019-02-04 PROCEDURE — 74011250636 HC RX REV CODE- 250/636: Performed by: INTERNAL MEDICINE

## 2019-02-04 RX ADMIN — ERYTHROPOIETIN 40000 UNITS: 40000 INJECTION, SOLUTION INTRAVENOUS; SUBCUTANEOUS at 12:48

## 2019-02-04 RX ADMIN — ERYTHROPOIETIN 20000 UNITS: 20000 INJECTION, SOLUTION INTRAVENOUS; SUBCUTANEOUS at 12:48

## 2019-02-04 NOTE — PATIENT INSTRUCTIONS
Anemia From Chronic Disease: Care Instructions  Your Care Instructions    Anemia is a low level of red blood cells. Red blood cells carry oxygen from your lungs to the rest of your body. Sometimes when you have a long-term (chronic) disease, such as kidney disease, arthritis, diabetes, cancer, or an infection, your body does not make enough red blood cells. Follow-up care is a key part of your treatment and safety. Be sure to make and go to all appointments, and call your doctor if you are having problems. It's also a good idea to know your test results and keep a list of the medicines you take. How can you care for yourself at home? · Follow your doctor's instructions to treat the chronic condition that is causing the anemia. · Be safe with medicines. Take your medicine to treat your chronic condition exactly as prescribed. Call your doctor if you think you are having a problem with your medicine. · Take your medicine for anemia exactly as prescribed. Call your doctor if you think you are having a problem with your medicine. Medicines to increase the number of red blood cells (such as epoetin or darbepoetin) may be given as an injection. ? If you miss a dose, take it as soon as you can, unless it is almost time for your next dose. In that case, get back on your regular schedule and take only one dose. ? Do not freeze this medicine. Store it in the refrigerator. Do not shake the bottle before you prepare the shot. · Keep all your appointments for blood tests to check on your hemoglobin levels. When should you call for help? Call 911 anytime you think you may need emergency care.  For example, call if:    · You passed out (lost consciousness).    Call your doctor now or seek immediate medical care if:    · You are short of breath.     · You are dizzy or light-headed, or you feel like you may faint.     · You have new or worse bleeding.    Watch closely for changes in your health, and be sure to contact your doctor if:    · You feel weaker or more tired than usual.     · You do not get better as expected. Where can you learn more? Go to http://glenna-toby.info/. Enter E502 in the search box to learn more about \"Anemia From Chronic Disease: Care Instructions. \"  Current as of: May 6, 2018  Content Version: 11.9  © 4785-2436 [employer]. Care instructions adapted under license by [employer] (which disclaims liability or warranty for this information). If you have questions about a medical condition or this instruction, always ask your healthcare professional. [place] [address] any warranty or liability for your use of this information. Myelodysplastic Syndromes: Care Instructions  Your Care Instructions  Myelodysplastic syndromes, also called MDS, are a group of rare conditions in which the bone marrow does not make enough healthy blood cells. Normally, the bone marrow makes red blood cells, white blood cells, and platelets. These cells carry oxygen in the blood, help the body fight infections, and help the blood clot. With MDS, you may feel weak and tired, get infections often, and bruise easily, although symptoms tend to vary. MDS is a form of blood cancer. In some cases, MDS can turn into acute myeloid leukemia (AML), another type of cancer. Some people develop MDS after treatment for cancer or exposure to pesticides or other chemicals. But in most cases, the cause of MDS is not known. Your doctor will use the results of blood tests to guide your treatment. There are many types of MDS, with different treatment plans for each. If you have enough red blood cells and are feeling all right, you may not need active treatment, but you and your doctor will want to watch your condition carefully. If you start feeling lightheaded and have no energy, you may need a blood transfusion.  Your doctor also may give you antibiotics to prevent or treat infection. Follow-up care is a key part of your treatment and safety. Be sure to make and go to all appointments, and call your doctor if you are having problems. It's also a good idea to know your test results and keep a list of the medicines you take. How can you care for yourself at home? · Take your medicines exactly as prescribed. Call your doctor if you think you are having a problem with your medicine. You will get more details on the specific medicines your doctor prescribes. · If your doctor prescribed antibiotics, take them as directed. Do not stop taking them just because you feel better. You need to take the full course of antibiotics. If you have side effects from antibiotics, tell your doctor. · Take steps to control your stress and workload. Learn relaxation techniques. ? Share your feelings. Stress and tension affect our emotions. By expressing your feelings to others, you may be able to understand and cope with them. ? Consider joining a support group. Talking about a problem with your spouse, a good friend, or other people with similar problems is a good way to reduce tension and stress. ? Express yourself with art. Try writing, crafts, dance, or art to relieve stress. ? Be kind to your body and mind. Getting enough sleep, eating a healthy diet, and taking time to do things you enjoy can contribute to an overall feeling of balance in your life and help reduce stress. ? Get help if you need it. Discuss your concerns with your doctor or counselor. · Do not smoke. Smoking can make blood problems worse. If you need help quitting, talk to your doctor about stop-smoking programs and medicines. These can increase your chances of quitting for good. · If you have not already done so, prepare a list of advance directives. Advance directives are instructions to your doctor and family members about what kind of care you want if you become unable to speak or express yourself.   · Call the American [employer] Society (5-736-805-845-282-7466) or visit its website at 72[address] Faves. org for more information. When should you call for help? Call 911 anytime you think you may need emergency care. For example, call if:    · You passed out (lost consciousness).    Call your doctor now or seek immediate medical care if:    · You have a fever.     · You have abnormal bleeding.     · You have new or worse pain.     · You think you have an infection.     · You have new symptoms, such as a cough, belly pain, vomiting, diarrhea, or a rash.    Watch closely for changes in your health, and be sure to contact your doctor if:    · You are much more tired than usual.     · You have swollen glands in your armpits, groin, or neck.     · You do not get better as expected. Where can you learn more? Go to http://glenna-toby.info/. Enter Ml Rodríguez in the search box to learn more about \"Myelodysplastic Syndromes: Care Instructions. \"  Current as of: March 27, 2018  Content Version: 11.9  © 2184-2280 [employer]. Care instructions adapted under license by [facility] (which disclaims liability or warranty for this information). If you have questions about a medical condition or this instruction, always ask your healthcare professional. [address] any warranty or liability for your use of this information.

## 2019-02-04 NOTE — PROGRESS NOTES
Hematology/Oncology  Progress Note    Name: Frankie Vazquez. Date: 2019  : 1932    PCP: Benita Antoine NP     Mr. Aron Reeves is a 80 y.o. man who is seen for myriad medical problems including myelodysplastic syndrome and chronic anemia. Current therapy: Procrit 60,000 units every 4 weeks whenever the hemoglobin is below 10 g/dL and hematocrit is below 30%. Subjective:     Mr. Aron Reeves is an 80-year-old -American man who has a long-standing history of myelodysplastic syndrome. He also has chronic renal insufficiency and thus chronic anemia. The patient has long-standing arthritic discomfort in his joints. He is currently using a wheelchair for mobility support. The patient reports that he was recently hospitalized with congestive heart failure. He is currently in a rehab facility. Therefore he is using a wheelchair. Past medical history, family history, and social history: these were reviewed and remains unchanged.     Past Medical History:   Diagnosis Date    Arthritis     Bladder cancer ([facility])     Diabetes ([facility])     INsulin dependent    Gout     H/O CHF     acute    Myelodysplastic syndrome, unspecified ([facility])     Personal history of prostate cancer     Renal cyst      Past Surgical History:   Procedure Laterality Date    HX BACK SURGERY      HX CATARACT REMOVAL      HX HERNIA REPAIR      HX KNEE REPLACEMENT       Social History     Socioeconomic History    Marital status:      Spouse name: Not on file    Number of children: Not on file    Years of education: Not on file    Highest education level: Not on file   Social Needs    Financial resource strain: Not on file    Food insecurity - worry: Not on file    Food insecurity - inability: Not on file   [employer] needs - medical: Not on file   [employer] needs - non-medical: Not on file   Occupational History    Not on file   Tobacco Use    Smoking status: Former Smoker     Types: Cigarettes     Last attempt to quit: 1994     Years since quittin.7    Smokeless tobacco: Never Used   Substance and Sexual Activity    Alcohol use: No    Drug use: No    Sexual activity: Not on file   Other Topics Concern    Not on file   Social History Narrative    Not on file     Family History   Problem Relation Age of Onset    Diabetes Mother     Stroke Mother      Current Outpatient Medications   Medication Sig Dispense Refill    benzonatate (TESSALON) 100 mg capsule 100 mg.  bumetanide (BUMEX) 2 mg tablet 2 mg.  clopidogrel (PLAVIX) 75 mg tab 75 mg.      rosuvastatin (CRESTOR) 20 mg tablet 20 mg.      isosorbide dinitrate (ISORDIL) 30 mg tablet Take 20 mg by mouth four (4) times daily.  valsartan (DIOVAN) 40 mg tablet Take  by mouth daily.  colchicine 0.6 mg tablet take 1 tablet by mouth once daily  0    ferrous sulfate 325 mg (65 mg iron) tablet TAKE 1 TABLET BY MOUTH ONCE DAILY WITH BREAKFAST. TAKE WITH VITAMIN C ON EMPTY STOMACH  0    Insulin Syringes, Disposable, 1 mL syrg BD INsulin syringes with the BD Ultra Fine needle-8 mm/31 gauge      ergocalciferol (VITAMIN D2) 50,000 unit capsule Take 1 Cap by mouth every seven (7) days. Indications: Vitamin D Deficiency 12 Cap 0    allopurinol (ZYLOPRIM) 100 mg tablet Take  by mouth daily.  carvedilol (COREG) 25 mg tablet take 1 tablet by mouth twice a day  0    insulin glargine (LANTUS) 100 unit/mL injection by SubCUTAneous route nightly.  Cholecalciferol, Vitamin D3, 3,000 unit tab Take 6,000 Units by mouth.  aspirin delayed-release 81 mg tablet Take  by mouth daily.  TRUETEST TEST STRIPS strip       pioglitazone (ACTOS) 15 mg tablet       nitroglycerin (NITROSTAT) 0.4 mg SL tablet by SubLINGual route every five (5) minutes as needed.  gabapentin (NEURONTIN) 300 mg capsule Take 300 mg by mouth three (3) times daily.          Review of Systems  Constitutional: The patient has no acute distress or discomfort. HEENT: The patient denies recent head trauma, eye pain, blurred vision,  hearing deficit, oropharyngeal mucosal pain or lesions, and the patient denies throat pain or discomfort. Lymphatics: The patient denies palpable peripheral lymphadenopathy. Hematologic: The patient denies having bruising, bleeding, or progressive fatigue. Respiratory: Patient denies having shortness of breath, cough, sputum production, fever, or dyspnea on exertion. Cardiovascular: The patient denies having leg pain, leg swelling, heart palpitations, chest permit, chest pain, or lightheadedness. The patient denies having dyspnea on exertion. Gastrointestinal: The patient denies having nausea, emesis, or diarrhea. The patient denies having any hematemesis or blood in the stool. Genitourinary: Patient denies having urinary urgency, frequency, or dysuria. The patient denies having blood in the urine. Psychological: The patient denies having symptoms of nervousness, anxiety, depression, or thoughts of harming self. Skin: Patient denies having skin rashes, skin, ulcerations, or unexplained itching or pruritus. Musculoskeletal: The patient denies having pain in the joints or bones. Objective:     Visit Vitals  /64   Pulse 72   Temp 97.9 °F (36.6 °C) (Oral)   Resp 18   Ht 6' (1.829 m)   Wt 118.4 kg (261 lb)   SpO2 100%   BMI 35.40 kg/m²     ECOG PS=1; pain score=2/10    Physical Exam:   Gen. Appearance: The patient is in no acute distress. Skin: There is no bruise or rash. HEENT: The exam is unremarkable. Neck: Supple without lymphadenopathy or thyromegaly. Lungs: Clear to auscultation and percussion; there are no wheezes or rhonchi. Heart: Regular rate and rhythm; there are no murmurs, gallops, or rubs. Abdomen: Bowel sounds are present and normal.  There is no guarding, tenderness, or hepatosplenomegaly. Extremities: There is no clubbing, cyanosis, or edema.   Neurologic: There are no focal neurologic deficits. Lymphatics: There is no palpable peripheral lymphadenopathy. Musculoskeletal: The patient has full range of motion at all joints. There is no evidence of joint deformity or effusions. There is no focal joint tenderness. Psychological/psychiatric: There is no clinical evidence of anxiety, depression, or melancholy. Lab data:      Results for orders placed or performed during the hospital encounter of 02/04/19   CBC WITH 3 PART DIFF     Status: Abnormal   Result Value Ref Range Status    WBC 1.9 (L) 4.5 - 13.0 K/uL Final    RBC 3.35 (L) 4.10 - 5.10 M/uL Final    HGB 8.1 (L) 12.0 - 16 g/dL Final    HCT 28.1 (L) 36 - 48 % Final    MCV 83.9 78 - 102 FL Final    MCH 24.2 (L) 25.0 - 35.0 PG Final    MCHC 28.8 (L) 31 - 37 g/dL Final    RDW 19.5 (H) 11.5 - 14.5 % Final    PLATELET 744 277 - 903 K/uL Final    NEUTROPHILS 47 40 - 70 % Final    MIXED CELLS 10 0.1 - 17 % Final    LYMPHOCYTES 43 14 - 44 % Final    ABS. NEUTROPHILS 0.9 (L) 1.8 - 9.5 K/UL Final    ABS. MIXED CELLS 0.2 0.0 - 2.3 K/uL Final    ABS. LYMPHOCYTES 0.8 (L) 1.1 - 5.9 K/UL Final     Comment: Test performed at [address] Results Reviewed by Medical Director. DF AUTOMATED   Final           Assessment:     1. Thrombocytopenia ([facility] [address]    2. Myelodysplastic syndrome ([facility] [address]    3. Vitamin D deficiency    4. Anemia due to stage 4 chronic kidney disease ([facility] [address]      Plan:   Myelodysplastic syndrome: I have explained to the patient that his CBC today reveals that his WBC 1.9, the hemoglobin is 8.1g/dL, and hematocrit is 28.1%. The current platelet count is 989,107. Jeanmarie Cabello We will continue to monitor his CBC at 3 month intervals. I will contact the infusion center to see if he will be able to get his Procrit injection today. Procrit will be resumed every 4 weeks whenever the hemoglobin is below 10 g/dL and hematocrit is below 30%. An iron profile and ferritin level will be ordered along with a comprehensive metabolic panel. Leukopenia: The current CBC shows a decline of WBC count of 1.9 and the absolute neutrophil count is 0.9. I have explained to the patient that if his absolute neutrophil count declines below 0.5 we will start him on treatment with Neupogen. Pt denies any fevers, unexplained infections or rash. .     Thrombocytopenia: The current CBC shows that his platelet counts are relatively stable at 174,000. Therapeutic intervention is not necessary unless the platelet counts decline below 30,000. History of prostate cancer: The patient is clinically stable. The patient continues to receive Eligard every 4 months. On 01/02/2019 his PSA was 3.8 ng/mL. I will order a PSA level today. History of bladder cancer:  Patient is s/p for a urological procedure on 12/2/2016 by Dr. Luh Jeffers. He continues to have routine follow-up appts with the urologist.    Chronic arthritis: The patient is using Tramadol which provides adequate relief. This will continue. He is using a walker for support and mobility,     I will have the patient return in 3 months for a complete assessment or sooner if indicated. Orders Placed This Encounter    COMPLETE CBC & AUTO DIFF WBC    InHouse CBC ([employer])     Standing Status:   Future     Number of Occurrences:   1     Standing Expiration Date:   2/11/2019    IRON PROFILE     Standing Status:   Future     Standing Expiration Date:   2/5/2020    FERRITIN     Standing Status:   Future     Standing Expiration Date:   2/5/2020    VITAMIN D, 25 HYDROXY     Standing Status:   Future     Standing Expiration Date:   3/5/1864    METABOLIC PANEL, COMPREHENSIVE     Standing Status:   Future     Standing Expiration Date:   2/5/2020       González Do, YAIR  2/4/2019         I have assessed the patient independently and  agree with the full assessment as outlined. Mariana Brown MD, FACP      Please note: This document has been produced using voice recognition software.   Unrecognized errors in transcription may be present.

## 2019-02-05 LAB
25(OH)D3+25(OH)D2 SERPL-MCNC: 53.3 NG/ML (ref 30–100)
ALBUMIN SERPL-MCNC: 4 G/DL (ref 3.5–4.7)
ALBUMIN/GLOB SERPL: 1.5 {RATIO} (ref 1.2–2.2)
ALP SERPL-CCNC: 38 IU/L (ref 39–117)
ALT SERPL-CCNC: 14 IU/L (ref 0–44)
AST SERPL-CCNC: 26 IU/L (ref 0–40)
BILIRUB SERPL-MCNC: 0.8 MG/DL (ref 0–1.2)
BUN SERPL-MCNC: 26 MG/DL (ref 8–27)
BUN/CREAT SERPL: 18 (ref 10–24)
CALCIUM SERPL-MCNC: 8.7 MG/DL (ref 8.6–10.2)
CHLORIDE SERPL-SCNC: 102 MMOL/L (ref 96–106)
CO2 SERPL-SCNC: 25 MMOL/L (ref 20–29)
CREAT SERPL-MCNC: 1.46 MG/DL (ref 0.76–1.27)
FERRITIN SERPL-MCNC: 860 NG/ML (ref 30–400)
GLOBULIN SER CALC-MCNC: 2.6 G/DL (ref 1.5–4.5)
GLUCOSE SERPL-MCNC: 168 MG/DL (ref 65–99)
IRON SATN MFR SERPL: 40 % (ref 15–55)
IRON SERPL-MCNC: 94 UG/DL (ref 38–169)
POTASSIUM SERPL-SCNC: 3.7 MMOL/L (ref 3.5–5.2)
PROT SERPL-MCNC: 6.6 G/DL (ref 6–8.5)
SODIUM SERPL-SCNC: 143 MMOL/L (ref 134–144)
TIBC SERPL-MCNC: 234 UG/DL (ref 250–450)
UIBC SERPL-MCNC: 140 UG/DL (ref 111–343)

## 2019-03-04 ENCOUNTER — HOSPITAL ENCOUNTER (OUTPATIENT)
Dept: INFUSION THERAPY | Age: 84
Discharge: HOME | End: 2019-03-04
Payer: MEDICARE

## 2019-03-04 VITALS
HEART RATE: 67 BPM | DIASTOLIC BLOOD PRESSURE: 72 MMHG | OXYGEN SATURATION: 96 % | TEMPERATURE: 98.3 F | RESPIRATION RATE: 17 BRPM | SYSTOLIC BLOOD PRESSURE: 132 MMHG

## 2019-03-04 LAB
BASO+EOS+MONOS # BLD AUTO: 0.2 K/UL (ref 0–2.3)
BASO+EOS+MONOS NFR BLD AUTO: 9 % (ref 0.1–17)
DIFFERENTIAL METHOD BLD: ABNORMAL
ERYTHROCYTE [DISTWIDTH] IN BLOOD BY AUTOMATED COUNT: 20.2 % (ref 11.5–14.5)
HCT VFR BLD AUTO: 27.8 % (ref 36–48)
HGB BLD-MCNC: 8.1 G/DL (ref 12–16)
LYMPHOCYTES # BLD: 0.9 K/UL (ref 1.1–5.9)
LYMPHOCYTES NFR BLD: 42 % (ref 14–44)
MCH RBC QN AUTO: 25 PG (ref 25–35)
MCHC RBC AUTO-ENTMCNC: 29.1 G/DL (ref 31–37)
MCV RBC AUTO: 85.8 FL (ref 78–102)
NEUTS SEG # BLD: 1.1 K/UL (ref 1.8–9.5)
NEUTS SEG NFR BLD: 48 % (ref 40–70)
PLATELET # BLD AUTO: 179 K/UL (ref 140–440)
RBC # BLD AUTO: 3.24 M/UL (ref 4.1–5.1)
WBC # BLD AUTO: 2.2 K/UL (ref 4.5–13)

## 2019-03-04 PROCEDURE — 85025 COMPLETE CBC W/AUTO DIFF WBC: CPT

## 2019-03-04 PROCEDURE — 85049 AUTOMATED PLATELET COUNT: CPT

## 2019-03-04 PROCEDURE — 36415 COLL VENOUS BLD VENIPUNCTURE: CPT

## 2019-03-04 PROCEDURE — 74011250636 HC RX REV CODE- 250/636: Performed by: INTERNAL MEDICINE

## 2019-03-04 PROCEDURE — 96372 THER/PROPH/DIAG INJ SC/IM: CPT

## 2019-03-04 RX ADMIN — EPOETIN ALFA-EPBX 60000 UNITS: 40000 INJECTION, SOLUTION INTRAVENOUS; SUBCUTANEOUS at 13:42

## 2019-03-04 NOTE — PROGRESS NOTES
[facility] Progress Note Date: 2019 Name: Leydi Guevara MRN: [id number] : 1932 Retacrit/cbc Q 4 weeks Mr. Mago Langley arrived to [facility] at 1325 ambulatory. No complaints or concerns voiced. Mr. Mago Langley was assessed and education was provided. CBC drawn from left ac x 1 attempt. Gauze and coban applied to site. Lab results were obtained and reviewed. Recent Results (from the past 12 hour(s)) CBC WITH 3 PART DIFF Collection Time: 19  1:29 PM  
Result Value Ref Range WBC 2.2 (L) 4.5 - 13.0 K/uL  
 RBC 3.24 (L) 4.10 - 5.10 M/uL HGB 8.1 (L) 12.0 - 16 g/dL HCT 27.8 (L) 36 - 48 % MCV 85.8 78 - 102 FL  
 MCH 25.0 25.0 - 35.0 PG  
 MCHC 29.1 (L) 31 - 37 g/dL RDW 20.2 (H) 11.5 - 14.5 % NEUTROPHILS 48 40 - 70 % MIXED CELLS 9 0.1 - 17 % LYMPHOCYTES 42 14 - 44 % ABS. NEUTROPHILS 1.1 (L) 1.8 - 9.5 K/UL  
 ABS. MIXED CELLS 0.2 0.0 - 2.3 K/uL  
 ABS. LYMPHOCYTES 0.9 (L) 1.1 - 5.9 K/UL  
 DF AUTOMATED PLATELET COUNT Collection Time: 19  1:29 PM  
Result Value Ref Range PLATELET 877 689 - 637 K/uL Retacrit injection 60,000 units given SQ to upper back of leftt arm in two divided syringes per parameters. Tolerated well. Bandaid to site Mr. Matta tolerated well without complaints. Mr. Mago Langley was discharged from [place] [address] in stable condition at 1350. He is to return on 19 at [phone] for his next appointment. Valentino Shepard RN 
2019 
1350

## 2019-04-01 ENCOUNTER — HOSPITAL ENCOUNTER (OUTPATIENT)
Dept: INFUSION THERAPY | Age: 84
End: 2019-04-01
Payer: MEDICARE

## 2019-04-04 ENCOUNTER — HOSPITAL ENCOUNTER (OUTPATIENT)
Dept: INFUSION THERAPY | Age: 84
Discharge: HOME | End: 2019-04-04
Payer: MEDICARE

## 2019-04-04 VITALS
DIASTOLIC BLOOD PRESSURE: 60 MMHG | TEMPERATURE: 98.5 F | OXYGEN SATURATION: 97 % | SYSTOLIC BLOOD PRESSURE: 103 MMHG | HEART RATE: 58 BPM | RESPIRATION RATE: 18 BRPM

## 2019-04-04 LAB
BASOPHILS # BLD: 0 K/UL (ref 0–0.06)
BASOPHILS NFR BLD: 0 % (ref 0–3)
DIFFERENTIAL METHOD BLD: ABNORMAL
EOSINOPHIL # BLD: 0 K/UL (ref 0–0.4)
EOSINOPHIL NFR BLD: 0 % (ref 0–5)
ERYTHROCYTE [DISTWIDTH] IN BLOOD BY AUTOMATED COUNT: 18.5 % (ref 11.6–14.5)
HCT VFR BLD AUTO: 24.2 % (ref 36–48)
HGB BLD-MCNC: 7.2 G/DL (ref 13–16)
LYMPHOCYTES # BLD: 0.4 K/UL (ref 0.8–3.5)
LYMPHOCYTES NFR BLD: 36 % (ref 20–51)
MCH RBC QN AUTO: 24.2 PG (ref 24–34)
MCHC RBC AUTO-ENTMCNC: 29.8 G/DL (ref 31–37)
MCV RBC AUTO: 81.2 FL (ref 74–97)
MONOCYTES # BLD: 0.1 K/UL (ref 0–1)
MONOCYTES NFR BLD: 10 % (ref 2–9)
NEUTS SEG # BLD: 0.5 K/UL (ref 1.8–8)
NEUTS SEG NFR BLD: 54 % (ref 42–75)
PLATELET # BLD AUTO: 122 K/UL (ref 135–420)
PLATELET COMMENTS,PCOM: ABNORMAL
RBC # BLD AUTO: 2.98 M/UL (ref 4.7–5.5)
RBC MORPH BLD: ABNORMAL
TOTAL CELLS COUNTED SPEC: 50
WBC # BLD AUTO: 1 K/UL (ref 4.6–13.2)

## 2019-04-04 PROCEDURE — 36415 COLL VENOUS BLD VENIPUNCTURE: CPT

## 2019-04-04 PROCEDURE — 74011250636 HC RX REV CODE- 250/636: Performed by: INTERNAL MEDICINE

## 2019-04-04 PROCEDURE — 85025 COMPLETE CBC W/AUTO DIFF WBC: CPT

## 2019-04-04 PROCEDURE — 96372 THER/PROPH/DIAG INJ SC/IM: CPT

## 2019-04-04 RX ADMIN — EPOETIN ALFA-EPBX 60000 UNITS: 40000 INJECTION, SOLUTION INTRAVENOUS; SUBCUTANEOUS at 09:38

## 2019-04-04 NOTE — PROGRESS NOTES
[facility] Progress Note Date: 2019 Name: Edwige Mckeon. MRN: [id number] : 1932 Retacrit/cbc Q 4 weeks Mr. Gisel Luis arrived to [facility] at 5 ambulatory. No complaints or concerns voiced. Mr. Gisel Luis was assessed and education was provided. CBC drawn from left ac x 1 attempt. Gauze and coban applied to site. Lab results were obtained and reviewed. No results found for this or any previous visit (from the past 12 hour(s)). Preliminary Lab Results scanned into media tab. H&H 7.2/24. 9. Notified Irma Arriaza NP, no new orders received. Continue to monitor. Retacrit injection 60,000 units given SQ to upper back of leftt arm in two divided syringes per parameters. Tolerated well. Bandaid to site Mr. Matta tolerated well without complaints. Mr. Gisel Luis was discharged from [place] [address] in stable condition at 10 [address] Cast[address]eda. He is to return on 19 at 0930 for his next appointment. Talat Jiménez, GREGORY 2019  
10 [address]

## 2019-04-04 NOTE — PROGRESS NOTES
Results reviewed. Lab Results Component                Value               Date/Time      WBC                      1.0 (L)             04/04/2019 09:23 AM  
     HGB (POC)                9.5 (A)             04/09/2014 12:10 PM  
     HGB                      7.2 (L)             04/04/2019 09:23 AM  
     HCT (POC)                33.6 (A)            04/09/2014 12:10 PM  
     HCT                      24.2 (L)            04/04/2019 09:23 AM  
     PLATELET                 122 (L)             04/04/2019 09:23 AM  
     MCV                      81.2                04/04/2019 09:23 AM

## 2019-04-29 ENCOUNTER — APPOINTMENT (OUTPATIENT)
Dept: INFUSION THERAPY | Age: 84
End: 2019-04-29
Payer: MEDICARE

## 2019-05-02 ENCOUNTER — HOSPITAL ENCOUNTER (OUTPATIENT)
Dept: INFUSION THERAPY | Age: 84
Discharge: HOME | End: 2019-05-02
Payer: MEDICARE

## 2019-05-02 VITALS
RESPIRATION RATE: 20 BRPM | HEART RATE: 64 BPM | SYSTOLIC BLOOD PRESSURE: 98 MMHG | TEMPERATURE: 98.5 F | OXYGEN SATURATION: 98 % | DIASTOLIC BLOOD PRESSURE: 60 MMHG

## 2019-05-02 LAB
BASOPHILS # BLD: 0 K/UL (ref 0–0.1)
BASOPHILS NFR BLD: 0 % (ref 0–2)
DIFFERENTIAL METHOD BLD: ABNORMAL
EOSINOPHIL # BLD: 0 K/UL (ref 0–0.4)
EOSINOPHIL NFR BLD: 1 % (ref 0–5)
ERYTHROCYTE [DISTWIDTH] IN BLOOD BY AUTOMATED COUNT: 19.4 % (ref 11.6–14.5)
HCT VFR BLD AUTO: 23.2 % (ref 36–48)
HGB BLD-MCNC: 6.9 G/DL (ref 13–16)
LYMPHOCYTES # BLD: 0.6 K/UL (ref 0.9–3.6)
LYMPHOCYTES NFR BLD: 19 % (ref 21–52)
MCH RBC QN AUTO: 23.9 PG (ref 24–34)
MCHC RBC AUTO-ENTMCNC: 29.7 G/DL (ref 31–37)
MCV RBC AUTO: 80.3 FL (ref 74–97)
MONOCYTES # BLD: 0.1 K/UL (ref 0.05–1.2)
MONOCYTES NFR BLD: 4 % (ref 3–10)
NEUTS SEG # BLD: 2.2 K/UL (ref 1.8–8)
NEUTS SEG NFR BLD: 76 % (ref 40–73)
PLATELET # BLD AUTO: 202 K/UL (ref 135–420)
PLATELET COMMENTS,PCOM: ABNORMAL
RBC # BLD AUTO: 2.89 M/UL (ref 4.7–5.5)
RBC MORPH BLD: ABNORMAL
WBC # BLD AUTO: 2.9 K/UL (ref 4.6–13.2)

## 2019-05-02 PROCEDURE — 96372 THER/PROPH/DIAG INJ SC/IM: CPT

## 2019-05-02 PROCEDURE — 86900 BLOOD TYPING SEROLOGIC ABO: CPT

## 2019-05-02 PROCEDURE — 86923 COMPATIBILITY TEST ELECTRIC: CPT

## 2019-05-02 PROCEDURE — 85025 COMPLETE CBC W/AUTO DIFF WBC: CPT

## 2019-05-02 PROCEDURE — 36415 COLL VENOUS BLD VENIPUNCTURE: CPT

## 2019-05-02 PROCEDURE — 74011250636 HC RX REV CODE- 250/636: Performed by: INTERNAL MEDICINE

## 2019-05-02 RX ORDER — SODIUM CHLORIDE 9 MG/ML
250 INJECTION, SOLUTION INTRAVENOUS AS NEEDED
Status: DISCONTINUED | OUTPATIENT
Start: 2019-05-02 | End: 2019-05-06 | Stop reason: HOSPADM

## 2019-05-02 RX ADMIN — EPOETIN ALFA-EPBX 60000 UNITS: 40000 INJECTION, SOLUTION INTRAVENOUS; SUBCUTANEOUS at 10:40

## 2019-05-03 ENCOUNTER — HOSPITAL ENCOUNTER (OUTPATIENT)
Dept: INFUSION THERAPY | Age: 84
Discharge: HOME | End: 2019-05-03
Payer: MEDICARE

## 2019-05-03 VITALS
TEMPERATURE: 98.8 F | SYSTOLIC BLOOD PRESSURE: 112 MMHG | OXYGEN SATURATION: 100 % | HEART RATE: 62 BPM | RESPIRATION RATE: 18 BRPM | DIASTOLIC BLOOD PRESSURE: 65 MMHG

## 2019-05-03 PROCEDURE — 77030013169 SET IV BLD ICUM -A

## 2019-05-03 PROCEDURE — 74011250636 HC RX REV CODE- 250/636: Performed by: INTERNAL MEDICINE

## 2019-05-03 PROCEDURE — 74011250637 HC RX REV CODE- 250/637: Performed by: NURSE PRACTITIONER

## 2019-05-03 PROCEDURE — P9016 RBC LEUKOCYTES REDUCED: HCPCS

## 2019-05-03 PROCEDURE — 36430 TRANSFUSION BLD/BLD COMPNT: CPT

## 2019-05-03 RX ORDER — DIPHENHYDRAMINE HCL 25 MG
25 CAPSULE ORAL ONCE
Status: COMPLETED | OUTPATIENT
Start: 2019-05-03 | End: 2019-05-03

## 2019-05-03 RX ORDER — SODIUM CHLORIDE 9 MG/ML
250 INJECTION, SOLUTION INTRAVENOUS AS NEEDED
Status: DISCONTINUED | OUTPATIENT
Start: 2019-05-03 | End: 2019-05-07 | Stop reason: HOSPADM

## 2019-05-03 RX ORDER — SODIUM CHLORIDE 0.9 % (FLUSH) 0.9 %
10-40 SYRINGE (ML) INJECTION AS NEEDED
Status: DISCONTINUED | OUTPATIENT
Start: 2019-05-03 | End: 2019-05-07 | Stop reason: HOSPADM

## 2019-05-03 RX ORDER — ACETAMINOPHEN 325 MG/1
650 TABLET ORAL ONCE
Status: COMPLETED | OUTPATIENT
Start: 2019-05-03 | End: 2019-05-03

## 2019-05-03 RX ORDER — SODIUM CHLORIDE 9 MG/ML
250 INJECTION, SOLUTION INTRAVENOUS CONTINUOUS
Status: DISCONTINUED | OUTPATIENT
Start: 2019-05-03 | End: 2019-05-04 | Stop reason: HOSPADM

## 2019-05-03 RX ADMIN — Medication 10 ML: at 13:25

## 2019-05-03 RX ADMIN — ACETAMINOPHEN 650 MG: 325 TABLET ORAL at 13:08

## 2019-05-03 RX ADMIN — DIPHENHYDRAMINE HYDROCHLORIDE 25 MG: 25 CAPSULE ORAL at 13:08

## 2019-05-03 RX ADMIN — SODIUM CHLORIDE 250 ML: 9 INJECTION, SOLUTION INTRAVENOUS at 13:25

## 2019-05-03 NOTE — PROGRESS NOTES
[facility] Progress Note Date: May 3, 2019 Name: Linnea Domingo MRN: [id number] : 1932 Mr. Jazlyn Mahoney arrived to [facility] at 1300 via wheel chair, accompanied by his daughter. No complaints of pain voiced, and no acute distress noted Mr. Jazlyn Mahoney was assessed and education was provided. Care notes provided. Patient verbalized understanding of blood transfusion, pre meds, route, side effects, possible reaction, and management if reaction or complications occurs. Mr. Roel Zapata vitals were reviewed. Visit Vitals /62 (BP 1 Location: Right arm, BP Patient Position: At rest) Pulse 61 Temp 98.7 °F (37.1 °C) Resp 16 SpO2 99% # 22g IV inserted in patient's LEFT FA  on the 3rd attempt. Positive for blood return and flushes without difficulty. Normal saline initiated at [place]. Pre-medications of tylenol and benadryl PO were administered as ordered. 1 unit of PRBCs administered as ordered followed by normal saline flush. Mr. Jazlyn Mahoney tolerated infusion without complaints. He declined to stay for observation. Patient Vitals for the past 8 hrs: 
 Temp Pulse Resp BP SpO2  
19 1545 98.8 °F (37.1 °C) 62 18 112/65 100 % 19 1445 97.8 °F (36.6 °C) 60 18 96/54   
19 1415 98.7 °F (37.1 °C) 61 16 107/62   
19 1400 99.1 °F (37.3 °C) (!) 59 16 105/60   
19 1344 99 °F (37.2 °C) 66 18 98/61   
19 1300 98.8 °F (37.1 °C) 69 18 99/56 99 % IV removed. Site without redness, swelling, bleeding or tenderness. Gauze and paper tape applied to site. Discharge instructions reviewed with pt. Pt verbalized understanding. Mr. Jazlyn Mahoney was discharged from [place] [address] in stable condition at 1600, via wheel chair, accompanied by his daughter. He is to return on 19 at 0900 for his cbc/retacrit appointment. Colleen Kelly RN May 3, 2019

## 2019-05-04 LAB
ABO + RH BLD: NORMAL
BLD PROD TYP BPU: NORMAL
BLOOD GROUP ANTIBODIES SERPL: NORMAL
BPU ID: NORMAL
CROSSMATCH RESULT,%XM: NORMAL
SPECIMEN EXP DATE BLD: NORMAL
STATUS OF UNIT,%ST: NORMAL
UNIT DIVISION, %UDIV: 0

## 2019-05-30 ENCOUNTER — HOSPITAL ENCOUNTER (OUTPATIENT)
Dept: INFUSION THERAPY | Age: 84
End: 2019-05-30
Payer: MEDICARE

## 2019-06-27 ENCOUNTER — HOSPITAL ENCOUNTER (OUTPATIENT)
Dept: INFUSION THERAPY | Age: 84
End: 2019-06-27
Payer: MEDICARE

## 2019-06-28 ENCOUNTER — HOSPITAL ENCOUNTER (OUTPATIENT)
Dept: INFUSION THERAPY | Age: 84
Discharge: HOME | End: 2019-06-28
Payer: MEDICARE

## 2019-06-28 VITALS
TEMPERATURE: 98.3 F | SYSTOLIC BLOOD PRESSURE: 98 MMHG | HEART RATE: 62 BPM | DIASTOLIC BLOOD PRESSURE: 57 MMHG | OXYGEN SATURATION: 100 % | RESPIRATION RATE: 18 BRPM

## 2019-06-28 LAB
BASO+EOS+MONOS # BLD AUTO: 0.2 K/UL (ref 0–2.3)
BASO+EOS+MONOS NFR BLD AUTO: 12 % (ref 0.1–17)
DIFFERENTIAL METHOD BLD: ABNORMAL
ERYTHROCYTE [DISTWIDTH] IN BLOOD BY AUTOMATED COUNT: 18.8 % (ref 11.5–14.5)
HCT VFR BLD AUTO: 26.3 % (ref 36–48)
HGB BLD-MCNC: 7.6 G/DL (ref 12–16)
LYMPHOCYTES # BLD: 0.5 K/UL (ref 1.1–5.9)
LYMPHOCYTES NFR BLD: 37 % (ref 14–44)
MCH RBC QN AUTO: 24.4 PG (ref 25–35)
MCHC RBC AUTO-ENTMCNC: 28.9 G/DL (ref 31–37)
MCV RBC AUTO: 84.3 FL (ref 78–102)
NEUTS SEG # BLD: 0.8 K/UL (ref 1.8–9.5)
NEUTS SEG NFR BLD: 51 % (ref 40–70)
PLATELET # BLD AUTO: 161 K/UL (ref 140–440)
RBC # BLD AUTO: 3.12 M/UL (ref 4.1–5.1)
WBC # BLD AUTO: 1.5 K/UL (ref 4.5–13)

## 2019-06-28 PROCEDURE — 85025 COMPLETE CBC W/AUTO DIFF WBC: CPT

## 2019-06-28 PROCEDURE — 96372 THER/PROPH/DIAG INJ SC/IM: CPT

## 2019-06-28 PROCEDURE — 36415 COLL VENOUS BLD VENIPUNCTURE: CPT

## 2019-06-28 PROCEDURE — 74011250636 HC RX REV CODE- 250/636: Performed by: INTERNAL MEDICINE

## 2019-06-28 RX ADMIN — EPOETIN ALFA-EPBX 60000 UNITS: 40000 INJECTION, SOLUTION INTRAVENOUS; SUBCUTANEOUS at 13:24

## 2019-06-28 NOTE — PROGRESS NOTES
[facility] Progress Note Date: 2019 Name: Keshav Navarrete. MRN: [id number] : 1932 Retacrit/cbc Q 4 weeks Mr. Cleopatra Burch arrived to [facility] at [phone] ambulatory. No complaints or concerns voiced. Mr. Cleopatra Burch was assessed and education was provided. CBC drawn from left lower arm near wrist x 1 attempt. Gauze and coban applied to site. Lab results were obtained and reviewed. Recent Results (from the past 12 hour(s)) CBC WITH 3 PART DIFF Collection Time: 19  1:10 PM  
Result Value Ref Range WBC 1.5 (L) 4.5 - 13.0 K/uL  
 RBC 3.12 (L) 4.10 - 5.10 M/uL HGB 7.6 (L) 12.0 - 16 g/dL HCT 26.3 (L) 36 - 48 % MCV 84.3 78 - 102 FL  
 MCH 24.4 (L) 25.0 - 35.0 PG  
 MCHC 28.9 (L) 31 - 37 g/dL  
 RDW 18.8 (H) 11.5 - 14.5 % PLATELET 223 025 - 218 K/uL NEUTROPHILS 51 40 - 70 % MIXED CELLS 12 0.1 - 17 % LYMPHOCYTES 37 14 - 44 % ABS. NEUTROPHILS 0.8 (L) 1.8 - 9.5 K/UL  
 ABS. MIXED CELLS 0.2 0.0 - 2.3 K/uL  
 ABS. LYMPHOCYTES 0.5 (L) 1.1 - 5.9 K/UL  
 DF AUTOMATED Retacrit injection 60,000 units given SQ to upper back of leftt arm in two divided syringes per parameters. Tolerated well. Bandaid to site Mr. Matta tolerated well without complaints. Mr. Cleopatra Burch was discharged from [place] [address] in stable condition at 1330. He is to return on 19 at 1400 for his next appointment. Hira Rg RN 
2019  
1421

## 2019-08-21 ENCOUNTER — OFFICE VISIT (OUTPATIENT)
Dept: ONCOLOGY | Age: 84
End: 2019-08-21

## 2019-08-21 VITALS
WEIGHT: 228 LBS | TEMPERATURE: 97.5 F | HEART RATE: 71 BPM | HEIGHT: 72 IN | DIASTOLIC BLOOD PRESSURE: 52 MMHG | BODY MASS INDEX: 30.88 KG/M2 | OXYGEN SATURATION: 98 % | SYSTOLIC BLOOD PRESSURE: 95 MMHG | RESPIRATION RATE: 16 BRPM

## 2019-08-21 DIAGNOSIS — N18.9 ANEMIA DUE TO CHRONIC KIDNEY DISEASE, UNSPECIFIED CKD STAGE: ICD-10-CM

## 2019-08-21 DIAGNOSIS — D63.1 ANEMIA DUE TO CHRONIC KIDNEY DISEASE, UNSPECIFIED CKD STAGE: ICD-10-CM

## 2019-08-21 DIAGNOSIS — C61 PROSTATE CANCER (HCC): ICD-10-CM

## 2019-08-21 DIAGNOSIS — D69.6 THROMBOCYTOPENIA (HCC): ICD-10-CM

## 2019-08-21 DIAGNOSIS — N18.4 ANEMIA DUE TO STAGE 4 CHRONIC KIDNEY DISEASE (HCC): ICD-10-CM

## 2019-08-21 DIAGNOSIS — C67.4 MALIGNANT NEOPLASM OF POSTERIOR WALL OF URINARY BLADDER (HCC): ICD-10-CM

## 2019-08-21 DIAGNOSIS — D63.1 ANEMIA DUE TO STAGE 4 CHRONIC KIDNEY DISEASE (HCC): ICD-10-CM

## 2019-08-21 DIAGNOSIS — D46.9 MYELODYSPLASTIC SYNDROME (HCC): Primary | ICD-10-CM

## 2019-08-22 NOTE — PATIENT INSTRUCTIONS
Anemia From Chronic Disease: Care Instructions  Your Care Instructions    Anemia is a low level of red blood cells. Red blood cells carry oxygen from your lungs to the rest of your body. Sometimes when you have a long-term (chronic) disease, such as kidney disease, arthritis, diabetes, cancer, or an infection, your body does not make enough red blood cells. Follow-up care is a key part of your treatment and safety. Be sure to make and go to all appointments, and call your doctor if you are having problems. It's also a good idea to know your test results and keep a list of the medicines you take. How can you care for yourself at home? · Follow your doctor's instructions to treat the chronic condition that is causing the anemia. · Be safe with medicines. Take your medicine to treat your chronic condition exactly as prescribed. Call your doctor if you think you are having a problem with your medicine. · Take your medicine for anemia exactly as prescribed. Call your doctor if you think you are having a problem with your medicine. Medicines to increase the number of red blood cells (such as epoetin or darbepoetin) may be given as an injection. ? If you miss a dose, take it as soon as you can, unless it is almost time for your next dose. In that case, get back on your regular schedule and take only one dose. ? Do not freeze this medicine. Store it in the refrigerator. Do not shake the bottle before you prepare the shot. · Keep all your appointments for blood tests to check on your hemoglobin levels. When should you call for help? Call 911 anytime you think you may need emergency care.  For example, call if:    · You passed out (lost consciousness).    Call your doctor now or seek immediate medical care if:    · You are short of breath.     · You are dizzy or light-headed, or you feel like you may faint.     · You have new or worse bleeding.    Watch closely for changes in your health, and be sure to contact your doctor if:    · You feel weaker or more tired than usual.     · You do not get better as expected. Where can you learn more? Go to http://glenna-toby.info/. Enter E502 in the search box to learn more about \"Anemia From Chronic Disease: Care Instructions. \"  Current as of: March 28, 2019  Content Version: 12.1  © 6591-2913 [employer]. Care instructions adapted under license by [employer] (which disclaims liability or warranty for this information). If you have questions about a medical condition or this instruction, always ask your healthcare professional. [place] [address] any warranty or liability for your use of this information.

## 2019-08-22 NOTE — PROGRESS NOTES
Hematology/Oncology  Progress Note    Name: Elaine Oppenheim. Date: 2019  : 1932    PCP: Jamil Temple NP     Mr. Romaine Alcaraz is a 80 y.o. man who is seen for myriad medical problems including myelodysplastic syndrome and chronic anemia. Current therapy: Procrit 60,000 units every 4 weeks whenever the hemoglobin is below 10 g/dL and hematocrit is below 30%. Subjective:     Mr. Romaine Alcaraz is an 72-year-old -American man who has a long-standing history of myelodysplastic syndrome. He also has chronic renal insufficiency and thus chronic anemia. The patient has long-standing arthritic discomfort in his joints. He is currently using a wheelchair for mobility support. He denies any concern or issues at this time. Past medical history, family history, and social history: these were reviewed and remains unchanged.     Past Medical History:   Diagnosis Date    Arthritis     [facility]     Diabetes ([facility]     INsulin dependent    Gout     H/O CHF     acute    Myelodysplastic syndrome, unspecified ([facility]     Personal history of prostate cancer     Renal cyst      Past Surgical History:   Procedure Laterality Date    HX BACK SURGERY      HX CATARACT REMOVAL      HX HERNIA REPAIR      HX KNEE REPLACEMENT       Social History     Socioeconomic History    Marital status:      Spouse name: Not on file    Number of children: Not on file    Years of education: Not on file    Highest education level: Not on file   Occupational History    Not on file   Social Needs    Financial resource strain: Not on file    Food insecurity:     Worry: Not on file     Inability: Not on file    Transportation needs:     Medical: Not on file     Non-medical: Not on file   Tobacco Use    Smoking status: Former Smoker     Types: Cigarettes     Last attempt to quit: 1994     Years since quittin.2    Smokeless tobacco: Never Used   Substance and Sexual Activity    Alcohol use: No    Drug use: No    Sexual activity: Not on file   Lifestyle    Physical activity:     Days per week: Not on file     Minutes per session: Not on file    Stress: Not on file   Relationships    Social connections:     Talks on phone: Not on file     Gets together: Not on file     Attends Tenriism service: Not on file     Active member of club or organization: Not on file     Attends meetings of clubs or organizations: Not on file     Relationship status: Not on file    Intimate partner violence:     Fear of current or ex partner: Not on file     Emotionally abused: Not on file     Physically abused: Not on file     Forced sexual activity: Not on file   Other Topics Concern    Not on file   Social History Narrative    Not on file     Family History   Problem Relation Age of Onset    Diabetes Mother     Stroke Mother      Current Outpatient Medications   Medication Sig Dispense Refill    benzonatate (TESSALON) 100 mg capsule 100 mg.  bumetanide (BUMEX) 2 mg tablet 2 mg.  clopidogrel (PLAVIX) 75 mg tab 75 mg.      rosuvastatin (CRESTOR) 20 mg tablet 20 mg.      isosorbide dinitrate (ISORDIL) 30 mg tablet Take 20 mg by mouth four (4) times daily.  valsartan (DIOVAN) 40 mg tablet Take  by mouth daily.  colchicine 0.6 mg tablet take 1 tablet by mouth once daily  0    ferrous sulfate 325 mg (65 mg iron) tablet TAKE 1 TABLET BY MOUTH ONCE DAILY WITH BREAKFAST. TAKE WITH VITAMIN C ON EMPTY STOMACH  0    Insulin Syringes, Disposable, 1 mL syrg BD INsulin syringes with the BD Ultra Fine needle-8 mm/31 gauge      ergocalciferol (VITAMIN D2) 50,000 unit capsule Take 1 Cap by mouth every seven (7) days. Indications: Vitamin D Deficiency 12 Cap 0    allopurinol (ZYLOPRIM) 100 mg tablet Take  by mouth daily.  carvedilol (COREG) 25 mg tablet take 1 tablet by mouth twice a day  0    insulin glargine (LANTUS) 100 unit/mL injection by SubCUTAneous route nightly.       Cholecalciferol, Vitamin D3, 3,000 unit tab Take 6,000 Units by mouth.  aspirin delayed-release 81 mg tablet Take  by mouth daily.  TRUETEST TEST STRIPS strip       pioglitazone (ACTOS) 15 mg tablet       nitroglycerin (NITROSTAT) 0.4 mg SL tablet by SubLINGual route every five (5) minutes as needed.  gabapentin (NEURONTIN) 300 mg capsule Take 300 mg by mouth three (3) times daily. Review of Systems  Constitutional: The patient has no acute distress or discomfort. HEENT: The patient denies recent head trauma, eye pain, blurred vision,  hearing deficit, oropharyngeal mucosal pain or lesions, and the patient denies throat pain or discomfort. Lymphatics: The patient denies palpable peripheral lymphadenopathy. Hematologic: The patient denies having bruising, bleeding, or progressive fatigue. Respiratory: Patient denies having shortness of breath, cough, sputum production, fever, or dyspnea on exertion. Cardiovascular: The patient denies having leg pain, leg swelling, heart palpitations, chest permit, chest pain, or lightheadedness. The patient denies having dyspnea on exertion. Gastrointestinal: The patient denies having nausea, emesis, or diarrhea. The patient denies having any hematemesis or blood in the stool. Genitourinary: Patient denies having urinary urgency, frequency, or dysuria. The patient denies having blood in the urine. Psychological: The patient denies having symptoms of nervousness, anxiety, depression, or thoughts of harming self. Skin: Patient denies having skin rashes, skin, ulcerations, or unexplained itching or pruritus. Musculoskeletal: The patient denies having pain in the joints or bones. Objective:     Visit Vitals  BP 95/52   Pulse 71   Temp 97.5 °F (36.4 °C) (Oral)   Resp 16   Ht 6' (1.829 m)   Wt 103.4 kg (228 lb)   SpO2 98%   BMI 30.92 kg/m²     ECOG PS=1; pain score=2/10    Physical Exam:   Gen. Appearance: The patient is in no acute distress. Skin: There is no bruise or rash. HEENT: The exam is unremarkable. Neck: Supple without lymphadenopathy or thyromegaly. Lungs: Clear to auscultation and percussion; there are no wheezes or rhonchi. Heart: Regular rate and rhythm; there are no murmurs, gallops, or rubs. Abdomen: Bowel sounds are present and normal.  There is no guarding, tenderness, or hepatosplenomegaly. Extremities: There is no clubbing, cyanosis, or edema. Neurologic: There are no focal neurologic deficits. Lymphatics: There is no palpable peripheral lymphadenopathy. Musculoskeletal: The patient has full range of motion at all joints. There is no evidence of joint deformity or effusions. There is no focal joint tenderness. Psychological/psychiatric: There is no clinical evidence of anxiety, depression, or melancholy. Lab data:      Results for orders placed or performed during the hospital encounter of 06/28/19   CBC WITH 3 PART DIFF     Status: Abnormal   Result Value Ref Range Status    WBC 1.5 (L) 4.5 - 13.0 K/uL Final    RBC 3.12 (L) 4.10 - 5.10 M/uL Final    HGB 7.6 (L) 12.0 - 16 g/dL Final    HCT 26.3 (L) 36 - 48 % Final    MCV 84.3 78 - 102 FL Final    MCH 24.4 (L) 25.0 - 35.0 PG Final    MCHC 28.9 (L) 31 - 37 g/dL Final    RDW 18.8 (H) 11.5 - 14.5 % Final    PLATELET 335 672 - 618 K/uL Final    NEUTROPHILS 51 40 - 70 % Final    MIXED CELLS 12 0.1 - 17 % Final    LYMPHOCYTES 37 14 - 44 % Final    ABS. NEUTROPHILS 0.8 (L) 1.8 - 9.5 K/UL Final    ABS. MIXED CELLS 0.2 0.0 - 2.3 K/uL Final    ABS. LYMPHOCYTES 0.5 (L) 1.1 - 5.9 K/UL Final     Comment: Test performed at [address] [place] [address] or Outpatient Infusion Center Location. Reviewed by Medical Director. DF AUTOMATED   Final           Assessment:     1. Myelodysplastic syndrome ([facility]    2. Anemia due to chronic kidney disease, unspecified CKD stage    3. Anemia due to stage 4 chronic kidney disease ([facility]    4. Thrombocytopenia ([facility]    5. [facility]    6.  Malignant neoplasm of posterior wall of urinary bladder (HCC)      Plan:   Myelodysplastic syndrome: I have explained to the patient that his CBC for today is not available he will be notify of his labs result when there is any abnormality  We will continue to monitor his CBC at 3 month intervals. Pt is schedule for Procrit and CBC at the infusion center on 8/29/2019. Procrit will be resumed every 4 weeks whenever the hemoglobin is below 10 g/dL and hematocrit is below 30%. An iron profile and ferritin level will be ordered along with a comprehensive metabolic panel. Leukopenia: The current CBC is not available at this time. I have explained to the patient that if his absolute neutrophil count declines below 0.5 we will start him on treatment with Neupogen. Pt denies any fevers, unexplained infections or rash. .     Thrombocytopenia: The current CBC is not available at this time. Previous  platelet counts was relatively stable at 174,000. Therapeutic intervention not necessary unless the platelet counts decline below 30,000. History of prostate cancer: The patient is clinically stable. The patient continues to receive Eligard every 4 months. On 01/02/2019 his PSA was 3.8 ng/mL. I will order a PSA level today. History of bladder cancer:  He continues to have routine follow-up appts with the urologist Dr Tereza Claire. Chronic arthritis: The patient is using Tramadol which provides adequate relief. This will continue. He is using a walker for support and mobility,     I will have the patient return in 3 months for a complete assessment or sooner if indicated.   Orders Placed This Encounter    FERRITIN     Standing Status:   Future     Standing Expiration Date:   4/81/8084    METABOLIC PANEL, COMPREHENSIVE     Standing Status:   Future     Standing Expiration Date:   8/22/2020    IRON PROFILE     Standing Status:   Future     Standing Expiration Date:   8/22/2020    PROTEIN S ANTIGEN     Standing Status:   Future Standing Expiration Date:   8/22/2020       Ashley Aranda NP  8/22/2019         I have assessed the patient independently and  agree with the full assessment as outlined. Candice Frias MD, FACP      Please note: This document has been produced using voice recognition software. Unrecognized errors in transcription may be present.

## 2019-08-23 ENCOUNTER — HOSPITAL ENCOUNTER (OUTPATIENT)
Dept: INFUSION THERAPY | Age: 84
End: 2019-08-23

## 2019-08-29 ENCOUNTER — HOSPITAL ENCOUNTER (OUTPATIENT)
Dept: INFUSION THERAPY | Age: 84
End: 2019-08-29

## 2019-08-30 ENCOUNTER — HOSPITAL ENCOUNTER (OUTPATIENT)
Dept: INFUSION THERAPY | Age: 84
End: 2019-08-30

## 2019-10-08 ENCOUNTER — HOSPITAL ENCOUNTER (OUTPATIENT)
Dept: INFUSION THERAPY | Age: 84
Discharge: HOME | End: 2019-10-08
Payer: MEDICARE

## 2019-10-08 VITALS
SYSTOLIC BLOOD PRESSURE: 114 MMHG | DIASTOLIC BLOOD PRESSURE: 61 MMHG | RESPIRATION RATE: 18 BRPM | OXYGEN SATURATION: 100 % | TEMPERATURE: 99.3 F | HEART RATE: 68 BPM

## 2019-10-08 LAB
BASO+EOS+MONOS # BLD AUTO: 0.5 K/UL (ref 0–2.3)
BASO+EOS+MONOS NFR BLD AUTO: 19 % (ref 0.1–17)
DIFFERENTIAL METHOD BLD: ABNORMAL
ERYTHROCYTE [DISTWIDTH] IN BLOOD BY AUTOMATED COUNT: 16 % (ref 11.5–14.5)
HCT VFR BLD AUTO: 27 % (ref 36–48)
HGB BLD-MCNC: 8.1 G/DL (ref 12–16)
LYMPHOCYTES # BLD: 0.9 K/UL (ref 1.1–5.9)
LYMPHOCYTES NFR BLD: 31 % (ref 14–44)
MCH RBC QN AUTO: 24.4 PG (ref 25–35)
MCHC RBC AUTO-ENTMCNC: 30 G/DL (ref 31–37)
MCV RBC AUTO: 81.3 FL (ref 78–102)
NEUTS SEG # BLD: 1.4 K/UL (ref 1.8–9.5)
NEUTS SEG NFR BLD: 50 % (ref 40–70)
PLATELET # BLD AUTO: 179 K/UL (ref 140–440)
RBC # BLD AUTO: 3.32 M/UL (ref 4.1–5.1)
WBC # BLD AUTO: 2.8 K/UL (ref 4.5–13)

## 2019-10-08 PROCEDURE — 85025 COMPLETE CBC W/AUTO DIFF WBC: CPT

## 2019-10-08 PROCEDURE — 36415 COLL VENOUS BLD VENIPUNCTURE: CPT

## 2019-10-08 PROCEDURE — 96372 THER/PROPH/DIAG INJ SC/IM: CPT

## 2019-10-08 PROCEDURE — 74011250636 HC RX REV CODE- 250/636: Performed by: INTERNAL MEDICINE

## 2019-10-08 RX ADMIN — EPOETIN ALFA-EPBX 60000 UNITS: 40000 INJECTION, SOLUTION INTRAVENOUS; SUBCUTANEOUS at 09:55

## 2019-10-08 NOTE — PROGRESS NOTES
[facility] Progress Note Date: 2019 Name: Edwige Mckeon. MRN: [id number] : 1932 Mr. Gisel Luis arrived to [facility] at 5690. Mr. Gisel Luis was assessed and education was provided. Mr. Lavelle Rosario vitals were reviewed. Visit Vitals /61 (BP 1 Location: Right arm, BP Patient Position: Sitting) Pulse 68 Temp 99.3 °F (37.4 °C) Resp 18 SpO2 100% Pt was observed for 5 minutes after obtaining vital signs prior to initiating treatment. Blood drawn for labs via left AC venipuncture x1 attempt. Lab results were obtained and reviewed. Recent Results (from the past 12 hour(s)) CBC WITH 3 PART DIFF Collection Time: 10/08/19  9:40 AM  
Result Value Ref Range WBC 2.8 (L) 4.5 - 13.0 K/uL  
 RBC 3.32 (L) 4.10 - 5.10 M/uL HGB 8.1 (L) 12.0 - 16 g/dL HCT 27.0 (L) 36 - 48 % MCV 81.3 78 - 102 FL  
 MCH 24.4 (L) 25.0 - 35.0 PG  
 MCHC 30.0 (L) 31 - 37 g/dL  
 RDW 16.0 (H) 11.5 - 14.5 % PLATELET 749 525 - 752 K/uL NEUTROPHILS 50 40 - 70 % MIXED CELLS 19 (H) 0.1 - 17 % LYMPHOCYTES 31 14 - 44 % ABS. NEUTROPHILS 1.4 (L) 1.8 - 9.5 K/UL  
 ABS. MIXED CELLS 0.5 0.0 - 2.3 K/uL  
 ABS. LYMPHOCYTES 0.9 (L) 1.1 - 5.9 K/UL  
 DF AUTOMATED Retacrit [id number] units was administered as ordered SQ in patient's L upper arm (in 2 divided doses). Mr. Gisel Luis tolerated well without complaints. Pt armband removed & shredded. Mr. Gisel Luis was discharged from [place] [address] in stable condition at 1000. He is to return on 19 at 0930 for his next appointment. Sander Cleaning RN 2019

## 2019-10-29 RX ORDER — SODIUM CHLORIDE 0.9 % (FLUSH) 0.9 %
10 SYRINGE (ML) INJECTION AS NEEDED
Status: CANCELLED
Start: 2019-11-05

## 2019-10-29 RX ORDER — HEPARIN 100 UNIT/ML
300-500 SYRINGE INTRAVENOUS AS NEEDED
Status: CANCELLED
Start: 2019-11-05

## 2019-10-29 RX ORDER — SODIUM CHLORIDE 9 MG/ML
10 INJECTION INTRAMUSCULAR; INTRAVENOUS; SUBCUTANEOUS AS NEEDED
Status: CANCELLED | OUTPATIENT
Start: 2019-11-05

## 2019-11-05 ENCOUNTER — HOSPITAL ENCOUNTER (OUTPATIENT)
Dept: INFUSION THERAPY | Age: 84
Discharge: HOME | End: 2019-11-05
Payer: MEDICARE

## 2019-11-05 VITALS
HEART RATE: 69 BPM | TEMPERATURE: 98.6 F | OXYGEN SATURATION: 100 % | SYSTOLIC BLOOD PRESSURE: 91 MMHG | DIASTOLIC BLOOD PRESSURE: 48 MMHG | RESPIRATION RATE: 18 BRPM

## 2019-11-05 DIAGNOSIS — D63.1 ANEMIA DUE TO STAGE 4 CHRONIC KIDNEY DISEASE (HCC): Primary | ICD-10-CM

## 2019-11-05 DIAGNOSIS — N18.4 ANEMIA DUE TO STAGE 4 CHRONIC KIDNEY DISEASE (HCC): ICD-10-CM

## 2019-11-05 DIAGNOSIS — D63.1 ANEMIA DUE TO STAGE 4 CHRONIC KIDNEY DISEASE (HCC): ICD-10-CM

## 2019-11-05 DIAGNOSIS — N18.4 ANEMIA DUE TO STAGE 4 CHRONIC KIDNEY DISEASE (HCC): Primary | ICD-10-CM

## 2019-11-05 LAB
BASO+EOS+MONOS # BLD AUTO: 0.3 K/UL (ref 0–2.3)
BASO+EOS+MONOS NFR BLD AUTO: 19 % (ref 0.1–17)
DIFFERENTIAL METHOD BLD: ABNORMAL
ERYTHROCYTE [DISTWIDTH] IN BLOOD BY AUTOMATED COUNT: 17.2 % (ref 11.5–14.5)
HCT VFR BLD AUTO: 25.6 % (ref 36–48)
HGB BLD-MCNC: 7.5 G/DL (ref 12–16)
LYMPHOCYTES # BLD: 0.5 K/UL (ref 1.1–5.9)
LYMPHOCYTES NFR BLD: 27 % (ref 14–44)
MCH RBC QN AUTO: 23.4 PG (ref 25–35)
MCHC RBC AUTO-ENTMCNC: 29.3 G/DL (ref 31–37)
MCV RBC AUTO: 80 FL (ref 78–102)
NEUTS SEG # BLD: 1 K/UL (ref 1.8–9.5)
NEUTS SEG NFR BLD: 55 % (ref 40–70)
PLATELET # BLD AUTO: 229 K/UL (ref 140–440)
RBC # BLD AUTO: 3.2 M/UL (ref 4.1–5.1)
WBC # BLD AUTO: 1.8 K/UL (ref 4.5–13)

## 2019-11-05 PROCEDURE — 85025 COMPLETE CBC W/AUTO DIFF WBC: CPT

## 2019-11-05 PROCEDURE — 74011250636 HC RX REV CODE- 250/636: Performed by: INTERNAL MEDICINE

## 2019-11-05 PROCEDURE — 96372 THER/PROPH/DIAG INJ SC/IM: CPT

## 2019-11-05 PROCEDURE — 36415 COLL VENOUS BLD VENIPUNCTURE: CPT

## 2019-11-05 PROCEDURE — 99211 OFF/OP EST MAY X REQ PHY/QHP: CPT

## 2019-11-05 RX ORDER — SODIUM CHLORIDE 0.9 % (FLUSH) 0.9 %
10 SYRINGE (ML) INJECTION AS NEEDED
Status: CANCELLED
Start: 2019-12-03

## 2019-11-05 RX ORDER — SODIUM CHLORIDE 9 MG/ML
10 INJECTION INTRAMUSCULAR; INTRAVENOUS; SUBCUTANEOUS AS NEEDED
Status: CANCELLED | OUTPATIENT
Start: 2019-12-03

## 2019-11-05 RX ORDER — HEPARIN 100 UNIT/ML
300-500 SYRINGE INTRAVENOUS AS NEEDED
Status: CANCELLED
Start: 2019-12-03

## 2019-11-05 RX ADMIN — EPOETIN ALFA-EPBX 60000 UNITS: 40000 INJECTION, SOLUTION INTRAVENOUS; SUBCUTANEOUS at 10:36

## 2019-11-05 NOTE — PROGRESS NOTES
[facility] Progress Note    Date: 2019    Name: Jacque Fuller. MRN: [id number]         : 1932      Mr. Yenny Sutherland arrived to [facility] at 7425. Mr. Yenny Sutherland was assessed and education was provided. Mr. Sulma Malave vitals were reviewed. Visit Vitals  BP 91/48 (BP 1 Location: Left arm, BP Patient Position: Sitting)   Pulse 69   Temp 98.6 °F (37 °C)   Resp 18   SpO2 100%     Pt was observed for 5 minutes after obtaining vital signs prior to initiating treatment. Blood drawn for labs via left AC venipuncture x1 attempt. Lab results were obtained and reviewed. Recent Results (from the past 12 hour(s))   CBC WITH 3 PART DIFF    Collection Time: 19 10:11 AM   Result Value Ref Range    WBC 1.8 (L) 4.5 - 13.0 K/uL    RBC 3.20 (L) 4.10 - 5.10 M/uL    HGB 7.5 (L) 12.0 - 16 g/dL    HCT 25.6 (L) 36 - 48 %    MCV 80.0 78 - 102 FL    MCH 23.4 (L) 25.0 - 35.0 PG    MCHC 29.3 (L) 31 - 37 g/dL    RDW 17.2 (H) 11.5 - 14.5 %    PLATELET 692 210 - 151 K/uL    NEUTROPHILS 55 40 - 70 %    MIXED CELLS 19 (H) 0.1 - 17 %    LYMPHOCYTES 27 14 - 44 %    ABS. NEUTROPHILS 1.0 (L) 1.8 - 9.5 K/UL    ABS. MIXED CELLS 0.3 0.0 - 2.3 K/uL    ABS. LYMPHOCYTES 0.5 (L) 1.1 - 5.9 K/UL    DF AUTOMATED         Notified Armida Deluca NP, of pt's h/h results & orders received to increase pt's CBC/ Retacrit appointments from q4 to q2 weeks. Updated plan of care d/w pt & pt's daughter & they verbalized understanding. Retacrit [id number] units was administered as ordered SQ in patient's L upper arm (in 2 divided doses). Mr. Yenny Sutherland tolerated well without complaints. Pt armband removed & shredded. Mr. Yenny Sutherland was discharged from [place] [address] in stable condition at 1040. He is to return on 19 at 1300 for his next appointment.     Jose Riddle RN  2019

## 2019-11-12 RX ORDER — SODIUM CHLORIDE 9 MG/ML
10 INJECTION INTRAMUSCULAR; INTRAVENOUS; SUBCUTANEOUS AS NEEDED
Status: CANCELLED | OUTPATIENT
Start: 2019-11-19

## 2019-11-12 RX ORDER — HEPARIN 100 UNIT/ML
300-500 SYRINGE INTRAVENOUS AS NEEDED
Status: CANCELLED
Start: 2019-11-19

## 2019-11-12 RX ORDER — SODIUM CHLORIDE 0.9 % (FLUSH) 0.9 %
10 SYRINGE (ML) INJECTION AS NEEDED
Status: CANCELLED
Start: 2019-11-19

## 2019-11-19 ENCOUNTER — HOSPITAL ENCOUNTER (OUTPATIENT)
Dept: INFUSION THERAPY | Age: 84
Discharge: HOME | End: 2019-11-19
Payer: MEDICARE

## 2019-11-19 VITALS
OXYGEN SATURATION: 100 % | RESPIRATION RATE: 18 BRPM | DIASTOLIC BLOOD PRESSURE: 57 MMHG | SYSTOLIC BLOOD PRESSURE: 107 MMHG | TEMPERATURE: 98.6 F | HEART RATE: 65 BPM

## 2019-11-19 DIAGNOSIS — D63.1 ANEMIA DUE TO STAGE 4 CHRONIC KIDNEY DISEASE (HCC): Primary | ICD-10-CM

## 2019-11-19 DIAGNOSIS — N18.4 ANEMIA DUE TO STAGE 4 CHRONIC KIDNEY DISEASE (HCC): Primary | ICD-10-CM

## 2019-11-19 LAB
BASO+EOS+MONOS # BLD AUTO: 0.1 K/UL (ref 0–2.3)
BASO+EOS+MONOS NFR BLD AUTO: 8 % (ref 0.1–17)
DIFFERENTIAL METHOD BLD: ABNORMAL
ERYTHROCYTE [DISTWIDTH] IN BLOOD BY AUTOMATED COUNT: 19 % (ref 11.5–14.5)
HCT VFR BLD AUTO: 26.4 % (ref 36–48)
HGB BLD-MCNC: 7.6 G/DL (ref 12–16)
LYMPHOCYTES # BLD: 0.5 K/UL (ref 1.1–5.9)
LYMPHOCYTES NFR BLD: 26 % (ref 14–44)
MCH RBC QN AUTO: 23.4 PG (ref 25–35)
MCHC RBC AUTO-ENTMCNC: 28.8 G/DL (ref 31–37)
MCV RBC AUTO: 81.2 FL (ref 78–102)
NEUTS SEG # BLD: 1.2 K/UL (ref 1.8–9.5)
NEUTS SEG NFR BLD: 67 % (ref 40–70)
PLATELET # BLD AUTO: 188 K/UL (ref 140–440)
RBC # BLD AUTO: 3.25 M/UL (ref 4.1–5.1)
WBC # BLD AUTO: 1.8 K/UL (ref 4.5–13)

## 2019-11-19 PROCEDURE — 85025 COMPLETE CBC W/AUTO DIFF WBC: CPT

## 2019-11-19 PROCEDURE — 36415 COLL VENOUS BLD VENIPUNCTURE: CPT

## 2019-11-19 PROCEDURE — 74011250636 HC RX REV CODE- 250/636: Performed by: INTERNAL MEDICINE

## 2019-11-19 PROCEDURE — 96372 THER/PROPH/DIAG INJ SC/IM: CPT

## 2019-11-19 RX ORDER — SODIUM CHLORIDE 9 MG/ML
10 INJECTION INTRAMUSCULAR; INTRAVENOUS; SUBCUTANEOUS AS NEEDED
Status: CANCELLED | OUTPATIENT
Start: 2019-12-17

## 2019-11-19 RX ORDER — HEPARIN 100 UNIT/ML
300-500 SYRINGE INTRAVENOUS AS NEEDED
Status: CANCELLED
Start: 2019-12-17

## 2019-11-19 RX ORDER — SODIUM CHLORIDE 0.9 % (FLUSH) 0.9 %
10 SYRINGE (ML) INJECTION AS NEEDED
Status: CANCELLED
Start: 2019-12-17

## 2019-11-19 RX ADMIN — EPOETIN ALFA-EPBX 60000 UNITS: 40000 INJECTION, SOLUTION INTRAVENOUS; SUBCUTANEOUS at 13:50

## 2019-11-19 NOTE — PROGRESS NOTES
[facility] Progress Note    Date: 2019    Name: Jacque Fuller. MRN: [id number]         : 1932      Mr. Yenny Sutherland arrived to [place] at 0    Mr. Yenny Sutherland was assessed and education was provided. Mr. Sulma Malave vitals were reviewed. Visit Vitals  /57 (BP 1 Location: Left arm, BP Patient Position: At rest)   Pulse 65   Temp 98.6 °F (37 °C)   Resp 18   SpO2 100%     Pt was observed for 5 minutes after obtaining vital signs prior to initiating treatment. Blood drawn for labs via left AC venipuncture x 4 attempt. One by the writer and two by [employer] 1 successful attempt by Mercedes Dailey RN. Lab results were obtained and reviewed. Recent Results (from the past 12 hour(s))   CBC WITH 3 PART DIFF    Collection Time: 19  1:30 PM   Result Value Ref Range    WBC 1.8 (L) 4.5 - 13.0 K/uL    RBC 3.25 (L) 4.10 - 5.10 M/uL    HGB 7.6 (L) 12.0 - 16 g/dL    HCT 26.4 (L) 36 - 48 %    MCV 81.2 78 - 102 FL    MCH 23.4 (L) 25.0 - 35.0 PG    MCHC 28.8 (L) 31 - 37 g/dL    RDW 19.0 (H) 11.5 - 14.5 %    PLATELET 700 776 - 222 K/uL    NEUTROPHILS 67 40 - 70 %    MIXED CELLS 8 0.1 - 17 %    LYMPHOCYTES 26 14 - 44 %    ABS. NEUTROPHILS 1.2 (L) 1.8 - 9.5 K/UL    ABS. MIXED CELLS 0.1 0.0 - 2.3 K/uL    ABS. LYMPHOCYTES 0.5 (L) 1.1 - 5.9 K/UL    DF AUTOMATED         Retacrit [id number] units was administered as ordered SQ in patient's L upper arm (in 2 divided doses). Mr. Yenny Sutherland tolerated well without complaints. Pt armband removed & shredded. Mr. Yenny Sutherland was discharged from [place] [address] in stable condition at 1400. He is to return on 19 at 1300 for his next appointment.     Belle Guerrero RN  2019

## 2019-11-25 ENCOUNTER — HOSPITAL ENCOUNTER (OUTPATIENT)
Dept: ONCOLOGY | Age: 84
Discharge: HOME | End: 2019-11-25

## 2019-11-25 ENCOUNTER — OFFICE VISIT (OUTPATIENT)
Dept: ONCOLOGY | Age: 84
End: 2019-11-25

## 2019-11-25 VITALS
RESPIRATION RATE: 18 BRPM | HEART RATE: 72 BPM | HEIGHT: 72 IN | TEMPERATURE: 97.8 F | DIASTOLIC BLOOD PRESSURE: 68 MMHG | SYSTOLIC BLOOD PRESSURE: 105 MMHG | WEIGHT: 231.48 LBS | BODY MASS INDEX: 31.35 KG/M2

## 2019-11-25 DIAGNOSIS — D46.9 MYELODYSPLASTIC SYNDROME (HCC): ICD-10-CM

## 2019-11-25 DIAGNOSIS — D69.6 THROMBOCYTOPENIA (HCC): ICD-10-CM

## 2019-11-25 DIAGNOSIS — D63.1 ANEMIA DUE TO STAGE 4 CHRONIC KIDNEY DISEASE (HCC): Primary | ICD-10-CM

## 2019-11-25 DIAGNOSIS — C61 PROSTATE CANCER (HCC): ICD-10-CM

## 2019-11-25 DIAGNOSIS — E55.9 VITAMIN D DEFICIENCY: ICD-10-CM

## 2019-11-25 DIAGNOSIS — N18.4 ANEMIA DUE TO STAGE 4 CHRONIC KIDNEY DISEASE (HCC): Primary | ICD-10-CM

## 2019-11-25 DIAGNOSIS — C67.4 MALIGNANT NEOPLASM OF POSTERIOR WALL OF URINARY BLADDER (HCC): ICD-10-CM

## 2019-11-25 DIAGNOSIS — M10.9 GOUTY ARTHRITIS OF RIGHT HAND: ICD-10-CM

## 2019-11-25 LAB
BASO+EOS+MONOS # BLD AUTO: 0.3 K/UL (ref 0–2.3)
BASO+EOS+MONOS NFR BLD AUTO: 10 % (ref 0.1–17)
DIFFERENTIAL METHOD BLD: ABNORMAL
ERYTHROCYTE [DISTWIDTH] IN BLOOD BY AUTOMATED COUNT: 19 % (ref 11.5–14.5)
HCT VFR BLD AUTO: 30.7 % (ref 36–48)
HGB BLD-MCNC: 8.6 G/DL (ref 12–16)
LYMPHOCYTES # BLD: 0.8 K/UL (ref 1.1–5.9)
LYMPHOCYTES NFR BLD: 28 % (ref 14–44)
MCH RBC QN AUTO: 22.9 PG (ref 25–35)
MCHC RBC AUTO-ENTMCNC: 28 G/DL (ref 31–37)
MCV RBC AUTO: 81.6 FL (ref 78–102)
NEUTS SEG # BLD: 1.7 K/UL (ref 1.8–9.5)
NEUTS SEG NFR BLD: 62 % (ref 40–70)
PLATELET # BLD AUTO: 225 K/UL (ref 140–440)
RBC # BLD AUTO: 3.76 M/UL (ref 4.1–5.1)
WBC # BLD AUTO: 2.8 K/UL (ref 4.5–13)

## 2019-11-25 NOTE — PROGRESS NOTES
Hematology/Oncology  Progress Note    Name: Ebonie Starks. Date: 2019  : 1932    PCP: lAem Nayak NP     Mr. Cheng Bui is a 80 y.o. man who is seen for myriad medical problems including myelodysplastic syndrome and chronic anemia. Current therapy: Procrit 60,000 units every 4 weeks whenever the hemoglobin is below 10 g/dL and hematocrit is below 30%. Subjective:     Mr. Cheng Bui is an 80-year-old -American man who has a long-standing history of myelodysplastic syndrome. He also has chronic renal insufficiency and thus chronic anemia. The patient has long-standing arthritic discomfort in his joints. He is currently using a single crutch and a cane for mobility support. Today he is asking if I can refer him to a rheumatologist since he has what appears to be gout arthritis. He denies any other concern or issues at this time. Past medical history, family history, and social history: these were reviewed and remains unchanged.     Past Medical History:   Diagnosis Date    Arthritis     [facility]     Diabetes ([facility]     INsulin dependent    Gout     H/O CHF     acute    Myelodysplastic syndrome, unspecified ([facility]     Personal history of prostate cancer     Renal cyst      Past Surgical History:   Procedure Laterality Date    HX BACK SURGERY      HX CATARACT REMOVAL      HX HERNIA REPAIR      HX KNEE REPLACEMENT       Social History     Socioeconomic History    Marital status:      Spouse name: Not on file    Number of children: Not on file    Years of education: Not on file    Highest education level: Not on file   Occupational History    Not on file   Social Needs    Financial resource strain: Not on file    Food insecurity:     Worry: Not on file     Inability: Not on file    Transportation needs:     Medical: Not on file     Non-medical: Not on file   Tobacco Use    Smoking status: Former Smoker     Types: Cigarettes     Last attempt to quit: 1994     Years since quittin.5    Smokeless tobacco: Never Used   Substance and Sexual Activity    Alcohol use: No    Drug use: No    Sexual activity: Not on file   Lifestyle    Physical activity:     Days per week: Not on file     Minutes per session: Not on file    Stress: Not on file   Relationships    Social connections:     Talks on phone: Not on file     Gets together: Not on file     Attends Christian service: Not on file     Active member of club or organization: Not on file     Attends meetings of clubs or organizations: Not on file     Relationship status: Not on file    Intimate partner violence:     Fear of current or ex partner: Not on file     Emotionally abused: Not on file     Physically abused: Not on file     Forced sexual activity: Not on file   Other Topics Concern    Not on file   Social History Narrative    Not on file     Family History   Problem Relation Age of Onset    Diabetes Mother     Stroke Mother      Current Outpatient Medications   Medication Sig Dispense Refill    trazodone HCl (TRAZODONE PO) Take  by mouth.  bumetanide (BUMEX) 2 mg tablet 2 mg.  clopidogrel (PLAVIX) 75 mg tab 75 mg.      rosuvastatin (CRESTOR) 20 mg tablet 20 mg.      isosorbide dinitrate (ISORDIL) 30 mg tablet Take 20 mg by mouth four (4) times daily.  colchicine 0.6 mg tablet take 1 tablet by mouth once daily  0    ferrous sulfate 325 mg (65 mg iron) tablet TAKE 1 TABLET BY MOUTH ONCE DAILY WITH BREAKFAST. TAKE WITH VITAMIN C ON EMPTY STOMACH  0    ergocalciferol (VITAMIN D2) 50,000 unit capsule Take 1 Cap by mouth every seven (7) days. Indications: Vitamin D Deficiency 12 Cap 0    allopurinol (ZYLOPRIM) 100 mg tablet Take  by mouth daily.  carvedilol (COREG) 25 mg tablet take 1 tablet by mouth twice a day  0    aspirin delayed-release 81 mg tablet Take  by mouth daily.       nitroglycerin (NITROSTAT) 0.4 mg SL tablet by SubLINGual route every five (5) minutes as needed.  gabapentin (NEURONTIN) 300 mg capsule Take 300 mg by mouth three (3) times daily. Review of Systems  Constitutional: The patient has no acute distress or discomfort. HEENT: The patient denies recent head trauma, eye pain, blurred vision,  hearing deficit, oropharyngeal mucosal pain or lesions, and the patient denies throat pain or discomfort. Lymphatics: The patient denies palpable peripheral lymphadenopathy. Hematologic: The patient denies having bruising, bleeding, or progressive fatigue. Respiratory: Patient denies having shortness of breath, cough, sputum production, fever, or dyspnea on exertion. Cardiovascular: The patient denies having leg pain, leg swelling, heart palpitations, chest permit, chest pain, or lightheadedness. The patient denies having dyspnea on exertion. Gastrointestinal: The patient denies having nausea, emesis, or diarrhea. The patient denies having any hematemesis or blood in the stool. Genitourinary: Patient denies having urinary urgency, frequency, or dysuria. The patient denies having blood in the urine. Psychological: The patient denies having symptoms of nervousness, anxiety, depression, or thoughts of harming self. Skin: Patient denies having skin rashes, skin, ulcerations, or unexplained itching or pruritus. Musculoskeletal: The patient denies having pain in the joints or bones. Objective:     Visit Vitals  /68   Pulse 72   Temp 97.8 °F (36.6 °C)   Resp 18   Ht 6' (1.829 m)   Wt 105 kg (231 lb 7.7 oz)   BMI 31.39 kg/m²     ECOG PS=1; pain score=2/10    Physical Exam:   Gen. Appearance: The patient is in no acute distress. Skin: There is no bruise or rash. HEENT: The exam is unremarkable. Neck: Supple without lymphadenopathy or thyromegaly. Lungs: Clear to auscultation and percussion; there are no wheezes or rhonchi. Heart: Regular rate and rhythm; there are no murmurs, gallops, or rubs. Abdomen:  Bowel sounds are present and normal. There is no guarding, tenderness, or hepatosplenomegaly. Extremities: There is no clubbing, cyanosis, or edema. Neurologic: There are no focal neurologic deficits. Lymphatics: There is no palpable peripheral lymphadenopathy. Musculoskeletal: The patient has full range of motion at all joints. There is no evidence of joint deformity or effusions. There is focal swelling and tenderness over the right hand and wrist. Psychological/psychiatric: There is no clinical evidence of anxiety, depression, or melancholy. Lab data:      Results for orders placed or performed during the hospital encounter of 11/25/19   CBC WITH 3 PART DIFF     Status: Abnormal   Result Value Ref Range Status    WBC 2.8 (L) 4.5 - 13.0 K/uL Final    RBC 3.76 (L) 4.10 - 5.10 M/uL Final    HGB 8.6 (L) 12.0 - 16 g/dL Final    HCT 30.7 (L) 36 - 48 % Final    MCV 81.6 78 - 102 FL Final    MCH 22.9 (L) 25.0 - 35.0 PG Final    MCHC 28.0 (L) 31 - 37 g/dL Final    RDW 19.0 (H) 11.5 - 14.5 % Final    PLATELET 267 151 - 621 K/uL Final    NEUTROPHILS 62 40 - 70 % Final    MIXED CELLS 10 0.1 - 17 % Final    LYMPHOCYTES 28 14 - 44 % Final    ABS. NEUTROPHILS 1.7 (L) 1.8 - 9.5 K/UL Final    ABS. MIXED CELLS 0.3 0.0 - 2.3 K/uL Final    ABS. LYMPHOCYTES 0.8 (L) 1.1 - 5.9 K/UL Final     Comment: Test performed at [address] [place] [address] or Outpatient Infusion Center Location. Reviewed by Medical Director. DF AUTOMATED   Final           Assessment:     1. Anemia due to stage 4 chronic kidney disease ([address]    2. Myelodysplastic syndrome ([address]    3. Thrombocytopenia ([address]    4. Prostate cancer ([address]    5. Malignant neoplasm of posterior wall of urinary bladder (HCC)    6. Vitamin D deficiency    7. Gouty arthritis of right hand      Plan:   Myelodysplastic syndrome: I have explained to the patient that his CBC shows that his hemoglobin is 8.6 g/dL with hematocrit of 30.7%. We will continue to monitor his CBC at 3 month intervals. Pt is schedule for Procrit and CBC at the infusion center on 12/3/2019. Procrit will be resumed every 4 weeks whenever the hemoglobin is below 10 g/dL and hematocrit is below 30%. An iron profile and ferritin level will be ordered along with a comprehensive metabolic panel. Leukopenia: The current CBC shows that his WBC count is relatively stable at 2.8. The absolute neutrophil count is 1.7. I have explained to the patient that if his absolute neutrophil count declines below 0.5 we will start him on treatment with Neupogen. Pt denies any fevers, unexplained infections or rash. .     Thrombocytopenia: The current CBC is not available at this time. Previous  platelet counts was relatively stable at 225,000. Therapeutic intervention not necessary unless the platelet counts decline below 30,000. History of prostate cancer: The patient is clinically stable. The patient continues to receive Eligard every 4 months. On 8/29/2019 his PSA was 1.8 ng/mL. I will order a PSA level today. History of bladder cancer:  He continues to have routine follow-up appts with the urologist Dr Alicia Mendoza. Gouty arthritis/chronic arthritis: The patient is using Tramadol which provides adequate relief. This will continue. He is using a crutch and cane for mobility support. We will refer the patient to a different rheumatologist for an assessment to assist in the management of his chronic arthritis and gouty arthritis. I will have the patient return in 3 months for a complete assessment or sooner if indicated.   Orders Placed This Encounter    COMPLETE CBC & AUTO DIFF WBC    InHouse CBC ([employer])     Standing Status:   Future     Number of Occurrences:   1     Standing Expiration Date:   37/7/7185    METABOLIC PANEL, COMPREHENSIVE     Standing Status:   Future     Number of Occurrences:   1     Standing Expiration Date:   11/25/2020    IRON PROFILE     Standing Status:   Future     Number of Occurrences:   1 Standing Expiration Date:   11/25/2020    FERRITIN     Standing Status:   Future     Number of Occurrences:   1     Standing Expiration Date:   11/25/2020    PROSTATE SPECIFIC AG     Standing Status:   Future     Number of Occurrences:   1     Standing Expiration Date:   11/25/2020       Frank Galvin MD  11/25/2019         Please note: This document has been produced using voice recognition software. Unrecognized errors in transcription may be present.

## 2019-11-26 LAB
ALBUMIN SERPL-MCNC: 4.1 G/DL (ref 3.5–4.7)
ALBUMIN/GLOB SERPL: 1.5 {RATIO} (ref 1.2–2.2)
ALP SERPL-CCNC: 49 IU/L (ref 39–117)
ALT SERPL-CCNC: 5 IU/L (ref 0–44)
AST SERPL-CCNC: 16 IU/L (ref 0–40)
BILIRUB SERPL-MCNC: 1.1 MG/DL (ref 0–1.2)
BUN SERPL-MCNC: 19 MG/DL (ref 8–27)
BUN/CREAT SERPL: 15 (ref 10–24)
CALCIUM SERPL-MCNC: 8.9 MG/DL (ref 8.6–10.2)
CHLORIDE SERPL-SCNC: 99 MMOL/L (ref 96–106)
CO2 SERPL-SCNC: 24 MMOL/L (ref 20–29)
CREAT SERPL-MCNC: 1.31 MG/DL (ref 0.76–1.27)
FERRITIN SERPL-MCNC: 1133 NG/ML (ref 30–400)
GLOBULIN SER CALC-MCNC: 2.8 G/DL (ref 1.5–4.5)
GLUCOSE SERPL-MCNC: 156 MG/DL (ref 65–99)
IRON SATN MFR SERPL: 17 % (ref 15–55)
IRON SERPL-MCNC: 40 UG/DL (ref 38–169)
POTASSIUM SERPL-SCNC: 4.3 MMOL/L (ref 3.5–5.2)
PROT SERPL-MCNC: 6.9 G/DL (ref 6–8.5)
PSA SERPL-MCNC: 1.5 NG/ML (ref 0–4)
SODIUM SERPL-SCNC: 140 MMOL/L (ref 134–144)
TIBC SERPL-MCNC: 229 UG/DL (ref 250–450)
UIBC SERPL-MCNC: 189 UG/DL (ref 111–343)

## 2019-12-03 ENCOUNTER — HOSPITAL ENCOUNTER (OUTPATIENT)
Dept: INFUSION THERAPY | Age: 84
Discharge: HOME | End: 2019-12-03
Payer: MEDICARE

## 2019-12-03 ENCOUNTER — APPOINTMENT (OUTPATIENT)
Dept: INFUSION THERAPY | Age: 84
End: 2019-12-03
Payer: MEDICARE

## 2019-12-03 VITALS
OXYGEN SATURATION: 100 % | SYSTOLIC BLOOD PRESSURE: 105 MMHG | RESPIRATION RATE: 18 BRPM | DIASTOLIC BLOOD PRESSURE: 58 MMHG | HEART RATE: 62 BPM | TEMPERATURE: 99.4 F

## 2019-12-03 DIAGNOSIS — C67.9 MALIGNANT NEOPLASM OF URINARY BLADDER, UNSPECIFIED SITE (HCC): ICD-10-CM

## 2019-12-03 DIAGNOSIS — D46.9 MYELODYSPLASTIC SYNDROME (HCC): ICD-10-CM

## 2019-12-03 DIAGNOSIS — D63.1 ANEMIA DUE TO STAGE 4 CHRONIC KIDNEY DISEASE (HCC): Primary | ICD-10-CM

## 2019-12-03 DIAGNOSIS — N18.4 ANEMIA DUE TO STAGE 4 CHRONIC KIDNEY DISEASE (HCC): Primary | ICD-10-CM

## 2019-12-03 LAB
BASO+EOS+MONOS # BLD AUTO: 0.2 K/UL (ref 0–2.3)
BASO+EOS+MONOS NFR BLD AUTO: 12 % (ref 0.1–17)
DIFFERENTIAL METHOD BLD: ABNORMAL
ERYTHROCYTE [DISTWIDTH] IN BLOOD BY AUTOMATED COUNT: 18.7 % (ref 11.5–14.5)
HCT VFR BLD AUTO: 28.6 % (ref 36–48)
HGB BLD-MCNC: 8.2 G/DL (ref 12–16)
LYMPHOCYTES # BLD: 0.6 K/UL (ref 1.1–5.9)
LYMPHOCYTES NFR BLD: 30 % (ref 14–44)
MCH RBC QN AUTO: 23.3 PG (ref 25–35)
MCHC RBC AUTO-ENTMCNC: 28.7 G/DL (ref 31–37)
MCV RBC AUTO: 81.3 FL (ref 78–102)
NEUTS SEG # BLD: 1.3 K/UL (ref 1.8–9.5)
NEUTS SEG NFR BLD: 58 % (ref 40–70)
PLATELET # BLD AUTO: 97 K/UL (ref 135–420)
RBC # BLD AUTO: 3.52 M/UL (ref 4.1–5.1)
WBC # BLD AUTO: 2.1 K/UL (ref 4.5–13)

## 2019-12-03 PROCEDURE — 96372 THER/PROPH/DIAG INJ SC/IM: CPT

## 2019-12-03 PROCEDURE — 36415 COLL VENOUS BLD VENIPUNCTURE: CPT

## 2019-12-03 PROCEDURE — 74011250636 HC RX REV CODE- 250/636: Performed by: NURSE PRACTITIONER

## 2019-12-03 PROCEDURE — 85049 AUTOMATED PLATELET COUNT: CPT

## 2019-12-03 PROCEDURE — 85025 COMPLETE CBC W/AUTO DIFF WBC: CPT

## 2019-12-03 RX ORDER — HEPARIN 100 UNIT/ML
300-500 SYRINGE INTRAVENOUS AS NEEDED
Status: CANCELLED
Start: 2019-12-17

## 2019-12-03 RX ORDER — SODIUM CHLORIDE 9 MG/ML
10 INJECTION INTRAMUSCULAR; INTRAVENOUS; SUBCUTANEOUS AS NEEDED
Status: CANCELLED | OUTPATIENT
Start: 2019-12-17

## 2019-12-03 RX ORDER — SODIUM CHLORIDE 0.9 % (FLUSH) 0.9 %
10 SYRINGE (ML) INJECTION AS NEEDED
Status: CANCELLED
Start: 2019-12-17

## 2019-12-03 RX ADMIN — EPOETIN ALFA-EPBX 60000 UNITS: 40000 INJECTION, SOLUTION INTRAVENOUS; SUBCUTANEOUS at 14:00

## 2019-12-03 NOTE — PROGRESS NOTES
[facility] Progress Note    Date: December 3, 2019    Name: Brien Adame. MRN: [id number]         : 1932      Mr. Osmel Cabrera arrived to [facility] at [phone]. Mr. Osmel Cabrera was assessed and education was provided. Mr. Aaron Burrell vitals were reviewed. Visit Vitals  /58 (BP 1 Location: Left arm, BP Patient Position: Sitting)   Pulse 62   Temp 99.4 °F (37.4 °C)   Resp 18   SpO2 100%     Pt was observed for 5 minutes after obtaining vital signs prior to initiating treatment. Blood drawn for labs via left AC venipuncture x1 attempt. Lab results were obtained and reviewed. Recent Results (from the past 12 hour(s))   CBC WITH 3 PART DIFF    Collection Time: 19  1:39 PM   Result Value Ref Range    WBC 2.1 (L) 4.5 - 13.0 K/uL    RBC 3.52 (L) 4.10 - 5.10 M/uL    HGB 8.2 (L) 12.0 - 16 g/dL    HCT 28.6 (L) 36 - 48 %    MCV 81.3 78 - 102 FL    MCH 23.3 (L) 25.0 - 35.0 PG    MCHC 28.7 (L) 31 - 37 g/dL    RDW 18.7 (H) 11.5 - 14.5 %    NEUTROPHILS 58 40 - 70 %    MIXED CELLS 12 0.1 - 17 %    LYMPHOCYTES 30 14 - 44 %    ABS. NEUTROPHILS 1.3 (L) 1.8 - 9.5 K/UL    ABS. MIXED CELLS 0.2 0.0 - 2.3 K/uL    ABS. LYMPHOCYTES 0.6 (L) 1.1 - 5.9 K/UL    DF AUTOMATED         Retacrit 76808 units was administered as ordered SQ in patient's L upper arm (in 2 divided doses). Mr. Osmel Cabrera tolerated well without complaints. Pt armband removed & shredded. Mr. Osmel Cabrera was discharged from [place] [address] in stable condition at 1405. He is to return on 19 at 1300 for his next appointment.     Gee Mccall RN  December 3, 2019

## 2019-12-17 ENCOUNTER — HOSPITAL ENCOUNTER (OUTPATIENT)
Dept: INFUSION THERAPY | Age: 84
Discharge: HOME | End: 2019-12-17
Payer: MEDICARE

## 2019-12-31 ENCOUNTER — APPOINTMENT (OUTPATIENT)
Dept: INFUSION THERAPY | Age: 84
End: 2019-12-31
Payer: MEDICARE

## 2020-01-01 ENCOUNTER — HOSPITAL ENCOUNTER (OUTPATIENT)
Dept: INFUSION THERAPY | Age: 85
Discharge: HOME | End: 2020-09-02
Payer: MEDICARE

## 2020-01-01 ENCOUNTER — APPOINTMENT (OUTPATIENT)
Dept: INFUSION THERAPY | Age: 85
End: 2020-01-01
Payer: MEDICARE

## 2020-01-01 ENCOUNTER — HOSPITAL ENCOUNTER (OUTPATIENT)
Dept: INFUSION THERAPY | Age: 85
Discharge: HOME | End: 2020-09-30
Payer: MEDICARE

## 2020-01-01 ENCOUNTER — HOSPITAL ENCOUNTER (OUTPATIENT)
Dept: INFUSION THERAPY | Age: 85
Discharge: HOME | End: 2020-07-13
Payer: MEDICARE

## 2020-01-01 ENCOUNTER — HOSPITAL ENCOUNTER (OUTPATIENT)
Dept: INFUSION THERAPY | Age: 85
Discharge: HOME | End: 2020-03-04
Payer: MEDICARE

## 2020-01-01 ENCOUNTER — HOSPITAL ENCOUNTER (OUTPATIENT)
Dept: ONCOLOGY | Age: 85
Discharge: HOME | End: 2020-03-04

## 2020-01-01 ENCOUNTER — OFFICE VISIT (OUTPATIENT)
Dept: ONCOLOGY | Age: 85
End: 2020-01-01

## 2020-01-01 ENCOUNTER — HOSPITAL ENCOUNTER (OUTPATIENT)
Dept: INFUSION THERAPY | Age: 85
End: 2020-01-01

## 2020-01-01 ENCOUNTER — HOSPITAL ENCOUNTER (OUTPATIENT)
Dept: INFUSION THERAPY | Age: 85
Discharge: HOME | End: 2020-06-03
Payer: MEDICARE

## 2020-01-01 ENCOUNTER — HOSPITAL ENCOUNTER (OUTPATIENT)
Dept: INFUSION THERAPY | Age: 85
End: 2020-01-01
Payer: MEDICARE

## 2020-01-01 ENCOUNTER — LAB ONLY (OUTPATIENT)
Dept: ONCOLOGY | Age: 85
End: 2020-01-01

## 2020-01-01 ENCOUNTER — HOSPITAL ENCOUNTER (OUTPATIENT)
Dept: INFUSION THERAPY | Age: 85
Discharge: HOME | End: 2020-02-04
Payer: MEDICARE

## 2020-01-01 ENCOUNTER — APPOINTMENT (OUTPATIENT)
Dept: INFUSION THERAPY | Age: 85
End: 2020-01-01

## 2020-01-01 ENCOUNTER — HOSPITAL ENCOUNTER (OUTPATIENT)
Dept: INFUSION THERAPY | Age: 85
Discharge: HOME | End: 2020-12-01
Payer: MEDICARE

## 2020-01-01 ENCOUNTER — HOSPITAL ENCOUNTER (OUTPATIENT)
Dept: INFUSION THERAPY | Age: 85
Discharge: HOME | End: 2020-02-18
Payer: MEDICARE

## 2020-01-01 ENCOUNTER — HOSPITAL ENCOUNTER (OUTPATIENT)
Dept: INFUSION THERAPY | Age: 85
Discharge: HOME | End: 2020-08-05

## 2020-01-01 ENCOUNTER — HOSPITAL ENCOUNTER (OUTPATIENT)
Dept: INFUSION THERAPY | Age: 85
Discharge: HOME | End: 2020-01-21
Payer: MEDICARE

## 2020-01-01 VITALS
SYSTOLIC BLOOD PRESSURE: 135 MMHG | OXYGEN SATURATION: 95 % | DIASTOLIC BLOOD PRESSURE: 57 MMHG | RESPIRATION RATE: 18 BRPM | TEMPERATURE: 98.4 F | HEART RATE: 70 BPM | TEMPERATURE: 97.5 F | SYSTOLIC BLOOD PRESSURE: 116 MMHG | DIASTOLIC BLOOD PRESSURE: 67 MMHG | RESPIRATION RATE: 20 BRPM | OXYGEN SATURATION: 100 % | HEART RATE: 59 BPM

## 2020-01-01 VITALS
RESPIRATION RATE: 18 BRPM | HEART RATE: 61 BPM | OXYGEN SATURATION: 97 % | DIASTOLIC BLOOD PRESSURE: 52 MMHG | SYSTOLIC BLOOD PRESSURE: 112 MMHG | TEMPERATURE: 98.6 F

## 2020-01-01 VITALS
RESPIRATION RATE: 16 BRPM | TEMPERATURE: 98.5 F | WEIGHT: 245.2 LBS | SYSTOLIC BLOOD PRESSURE: 122 MMHG | DIASTOLIC BLOOD PRESSURE: 65 MMHG | OXYGEN SATURATION: 96 % | BODY MASS INDEX: 33.26 KG/M2 | HEART RATE: 70 BPM

## 2020-01-01 VITALS
OXYGEN SATURATION: 100 % | WEIGHT: 235 LBS | SYSTOLIC BLOOD PRESSURE: 133 MMHG | BODY MASS INDEX: 31.87 KG/M2 | TEMPERATURE: 98.5 F | RESPIRATION RATE: 16 BRPM | HEART RATE: 71 BPM | DIASTOLIC BLOOD PRESSURE: 72 MMHG

## 2020-01-01 VITALS
SYSTOLIC BLOOD PRESSURE: 118 MMHG | DIASTOLIC BLOOD PRESSURE: 61 MMHG | HEART RATE: 62 BPM | RESPIRATION RATE: 18 BRPM | TEMPERATURE: 98.2 F | OXYGEN SATURATION: 99 %

## 2020-01-01 VITALS
DIASTOLIC BLOOD PRESSURE: 63 MMHG | HEART RATE: 71 BPM | TEMPERATURE: 99 F | SYSTOLIC BLOOD PRESSURE: 119 MMHG | OXYGEN SATURATION: 99 % | RESPIRATION RATE: 16 BRPM

## 2020-01-01 VITALS
SYSTOLIC BLOOD PRESSURE: 99 MMHG | RESPIRATION RATE: 16 BRPM | BODY MASS INDEX: 31.39 KG/M2 | OXYGEN SATURATION: 98 % | HEIGHT: 72 IN | TEMPERATURE: 98.7 F | DIASTOLIC BLOOD PRESSURE: 46 MMHG | HEART RATE: 68 BPM

## 2020-01-01 VITALS
DIASTOLIC BLOOD PRESSURE: 64 MMHG | HEART RATE: 58 BPM | SYSTOLIC BLOOD PRESSURE: 103 MMHG | TEMPERATURE: 97.9 F | OXYGEN SATURATION: 99 % | RESPIRATION RATE: 18 BRPM

## 2020-01-01 VITALS
OXYGEN SATURATION: 97 % | DIASTOLIC BLOOD PRESSURE: 71 MMHG | TEMPERATURE: 98.7 F | HEART RATE: 70 BPM | SYSTOLIC BLOOD PRESSURE: 118 MMHG

## 2020-01-01 DIAGNOSIS — D46.9 MYELODYSPLASTIC SYNDROME (HCC): Primary | ICD-10-CM

## 2020-01-01 DIAGNOSIS — C67.4 MALIGNANT NEOPLASM OF POSTERIOR WALL OF URINARY BLADDER (HCC): ICD-10-CM

## 2020-01-01 DIAGNOSIS — D69.6 THROMBOCYTOPENIA (HCC): ICD-10-CM

## 2020-01-01 DIAGNOSIS — D63.1 ANEMIA DUE TO STAGE 4 CHRONIC KIDNEY DISEASE (HCC): Primary | ICD-10-CM

## 2020-01-01 DIAGNOSIS — D46.9 MYELODYSPLASTIC SYNDROME (HCC): ICD-10-CM

## 2020-01-01 DIAGNOSIS — D63.1 ANEMIA DUE TO STAGE 4 CHRONIC KIDNEY DISEASE (HCC): ICD-10-CM

## 2020-01-01 DIAGNOSIS — N18.4 ANEMIA DUE TO STAGE 4 CHRONIC KIDNEY DISEASE (HCC): Primary | ICD-10-CM

## 2020-01-01 DIAGNOSIS — N18.4 ANEMIA DUE TO STAGE 4 CHRONIC KIDNEY DISEASE (HCC): ICD-10-CM

## 2020-01-01 DIAGNOSIS — C67.9 MALIGNANT NEOPLASM OF URINARY BLADDER, UNSPECIFIED SITE (HCC): ICD-10-CM

## 2020-01-01 DIAGNOSIS — M10.9 GOUTY ARTHRITIS OF RIGHT HAND: ICD-10-CM

## 2020-01-01 DIAGNOSIS — C61 PROSTATE CANCER (HCC): ICD-10-CM

## 2020-01-01 DIAGNOSIS — E55.9 VITAMIN D DEFICIENCY: ICD-10-CM

## 2020-01-01 DIAGNOSIS — N18.9 ANEMIA DUE TO CHRONIC KIDNEY DISEASE, UNSPECIFIED CKD STAGE: ICD-10-CM

## 2020-01-01 DIAGNOSIS — M19.90 ARTHRITIS: ICD-10-CM

## 2020-01-01 DIAGNOSIS — D63.1 ANEMIA DUE TO CHRONIC KIDNEY DISEASE, UNSPECIFIED CKD STAGE: ICD-10-CM

## 2020-01-01 LAB
25(OH)D3+25(OH)D2 SERPL-MCNC: 47.9 NG/ML (ref 30–100)
ALBUMIN SERPL-MCNC: 3.7 G/DL (ref 3.6–4.6)
ALBUMIN/GLOB SERPL: 1.1 {RATIO} (ref 1.2–2.2)
ALP SERPL-CCNC: 43 IU/L (ref 39–117)
ALT SERPL-CCNC: 19 IU/L (ref 0–44)
AST SERPL-CCNC: 34 IU/L (ref 0–40)
BASO+EOS+MONOS # BLD AUTO: 0.2 K/UL (ref 0–2.3)
BASO+EOS+MONOS # BLD AUTO: 0.3 K/UL (ref 0–2.3)
BASO+EOS+MONOS # BLD AUTO: 0.4 K/UL (ref 0–2.3)
BASO+EOS+MONOS # BLD AUTO: 0.4 K/UL (ref 0–2.3)
BASO+EOS+MONOS # BLD AUTO: 0.5 K/UL (ref 0–2.3)
BASO+EOS+MONOS NFR BLD AUTO: 10 % (ref 0.1–17)
BASO+EOS+MONOS NFR BLD AUTO: 12 % (ref 0.1–17)
BASO+EOS+MONOS NFR BLD AUTO: 13 % (ref 0.1–17)
BASO+EOS+MONOS NFR BLD AUTO: 14 % (ref 0.1–17)
BASO+EOS+MONOS NFR BLD AUTO: 15 % (ref 0.1–17)
BASO+EOS+MONOS NFR BLD AUTO: 15 % (ref 0.1–17)
BASO+EOS+MONOS NFR BLD AUTO: 16 % (ref 0.1–17)
BILIRUB SERPL-MCNC: 0.6 MG/DL (ref 0–1.2)
BUN SERPL-MCNC: 43 MG/DL (ref 8–27)
BUN/CREAT SERPL: 29 (ref 10–24)
CALCIUM SERPL-MCNC: 8.9 MG/DL (ref 8.6–10.2)
CHLORIDE SERPL-SCNC: 94 MMOL/L (ref 96–106)
CO2 SERPL-SCNC: 28 MMOL/L (ref 20–29)
CREAT SERPL-MCNC: 1.47 MG/DL (ref 0.76–1.27)
DIFFERENTIAL METHOD BLD: ABNORMAL
ERYTHROCYTE [DISTWIDTH] IN BLOOD BY AUTOMATED COUNT: 16.9 % (ref 11.5–14.5)
ERYTHROCYTE [DISTWIDTH] IN BLOOD BY AUTOMATED COUNT: 17 % (ref 11.5–14.5)
ERYTHROCYTE [DISTWIDTH] IN BLOOD BY AUTOMATED COUNT: 17.9 % (ref 11.5–14.5)
ERYTHROCYTE [DISTWIDTH] IN BLOOD BY AUTOMATED COUNT: 18.3 % (ref 11.5–14.5)
ERYTHROCYTE [DISTWIDTH] IN BLOOD BY AUTOMATED COUNT: 18.4 % (ref 11.5–14.5)
ERYTHROCYTE [DISTWIDTH] IN BLOOD BY AUTOMATED COUNT: 19.2 % (ref 11.5–14.5)
ERYTHROCYTE [DISTWIDTH] IN BLOOD BY AUTOMATED COUNT: 20.4 % (ref 11.5–14.5)
FERRITIN SERPL-MCNC: 1912 NG/ML (ref 30–400)
GLOBULIN SER CALC-MCNC: 3.3 G/DL (ref 1.5–4.5)
GLUCOSE SERPL-MCNC: 190 MG/DL (ref 65–99)
HCT VFR BLD AUTO: 28.5 % (ref 36–48)
HCT VFR BLD AUTO: 29 % (ref 36–48)
HCT VFR BLD AUTO: 29.8 % (ref 36–48)
HCT VFR BLD AUTO: 33.1 % (ref 36–48)
HCT VFR BLD AUTO: 34.4 % (ref 36–48)
HCT VFR BLD AUTO: 34.6 % (ref 36–48)
HCT VFR BLD AUTO: 35 % (ref 36–48)
HGB BLD-MCNC: 10.2 G/DL (ref 12–16)
HGB BLD-MCNC: 10.4 G/DL (ref 12–16)
HGB BLD-MCNC: 8.3 G/DL (ref 12–16)
HGB BLD-MCNC: 8.4 G/DL (ref 12–16)
HGB BLD-MCNC: 8.5 G/DL (ref 12–16)
HGB BLD-MCNC: 9.8 G/DL (ref 12–16)
HGB BLD-MCNC: 9.9 G/DL (ref 12–16)
IRON SATN MFR SERPL: 14 % (ref 15–55)
IRON SERPL-MCNC: 24 UG/DL (ref 38–169)
LYMPHOCYTES # BLD: 0.7 K/UL (ref 1.1–5.9)
LYMPHOCYTES # BLD: 0.9 K/UL (ref 1.1–5.9)
LYMPHOCYTES # BLD: 1 K/UL (ref 1.1–5.9)
LYMPHOCYTES # BLD: 1.1 K/UL (ref 1.1–5.9)
LYMPHOCYTES NFR BLD: 12 % (ref 14–44)
LYMPHOCYTES NFR BLD: 25 % (ref 14–44)
LYMPHOCYTES NFR BLD: 29 % (ref 14–44)
LYMPHOCYTES NFR BLD: 31 % (ref 14–44)
LYMPHOCYTES NFR BLD: 37 % (ref 14–44)
LYMPHOCYTES NFR BLD: 38 % (ref 14–44)
LYMPHOCYTES NFR BLD: 40 % (ref 14–44)
MCH RBC QN AUTO: 23.3 PG (ref 25–35)
MCH RBC QN AUTO: 23.4 PG (ref 25–35)
MCH RBC QN AUTO: 23.7 PG (ref 25–35)
MCH RBC QN AUTO: 23.7 PG (ref 25–35)
MCH RBC QN AUTO: 23.8 PG (ref 25–35)
MCH RBC QN AUTO: 24.1 PG (ref 25–35)
MCH RBC QN AUTO: 24.9 PG (ref 25–35)
MCHC RBC AUTO-ENTMCNC: 28.5 G/DL (ref 31–37)
MCHC RBC AUTO-ENTMCNC: 28.5 G/DL (ref 31–37)
MCHC RBC AUTO-ENTMCNC: 28.6 G/DL (ref 31–37)
MCHC RBC AUTO-ENTMCNC: 29.5 G/DL (ref 31–37)
MCHC RBC AUTO-ENTMCNC: 29.5 G/DL (ref 31–37)
MCHC RBC AUTO-ENTMCNC: 29.7 G/DL (ref 31–37)
MCHC RBC AUTO-ENTMCNC: 29.9 G/DL (ref 31–37)
MCV RBC AUTO: 79.9 FL (ref 78–102)
MCV RBC AUTO: 80.3 FL (ref 78–102)
MCV RBC AUTO: 81.8 FL (ref 78–102)
MCV RBC AUTO: 81.9 FL (ref 78–102)
MCV RBC AUTO: 81.9 FL (ref 78–102)
MCV RBC AUTO: 83.2 FL (ref 78–102)
MCV RBC AUTO: 83.5 FL (ref 78–102)
NEUTS SEG # BLD: 0.8 K/UL (ref 1.8–9.5)
NEUTS SEG # BLD: 1 K/UL (ref 1.8–9.5)
NEUTS SEG # BLD: 1.3 K/UL (ref 1.8–9.5)
NEUTS SEG # BLD: 1.5 K/UL (ref 1.8–9.5)
NEUTS SEG # BLD: 1.8 K/UL (ref 1.8–9.5)
NEUTS SEG # BLD: 1.8 K/UL (ref 1.8–9.5)
NEUTS SEG # BLD: 4.3 K/UL (ref 1.8–9.5)
NEUTS SEG NFR BLD: 46 % (ref 40–70)
NEUTS SEG NFR BLD: 48 % (ref 40–70)
NEUTS SEG NFR BLD: 49 % (ref 40–70)
NEUTS SEG NFR BLD: 56 % (ref 40–70)
NEUTS SEG NFR BLD: 57 % (ref 40–70)
NEUTS SEG NFR BLD: 60 % (ref 40–70)
NEUTS SEG NFR BLD: 78 % (ref 40–70)
PLATELET # BLD AUTO: 103 K/UL (ref 135–420)
PLATELET # BLD AUTO: 150 K/UL (ref 135–420)
PLATELET # BLD AUTO: 153 K/UL (ref 140–440)
PLATELET # BLD AUTO: 171 K/UL (ref 140–440)
PLATELET # BLD AUTO: 182 K/UL (ref 135–420)
PLATELET # BLD AUTO: 187 K/UL (ref 140–440)
PLATELET # BLD AUTO: 295 K/UL (ref 140–440)
POTASSIUM SERPL-SCNC: 3.7 MMOL/L (ref 3.5–5.2)
PROT SERPL-MCNC: 7 G/DL (ref 6–8.5)
PSA SERPL-MCNC: 1.4 NG/ML (ref 0–4)
RBC # BLD AUTO: 3.54 M/UL (ref 4.1–5.1)
RBC # BLD AUTO: 3.55 M/UL (ref 4.1–5.1)
RBC # BLD AUTO: 3.57 M/UL (ref 4.1–5.1)
RBC # BLD AUTO: 3.98 M/UL (ref 4.1–5.1)
RBC # BLD AUTO: 4.2 M/UL (ref 4.1–5.1)
RBC # BLD AUTO: 4.23 M/UL (ref 4.1–5.1)
RBC # BLD AUTO: 4.38 M/UL (ref 4.1–5.1)
SODIUM SERPL-SCNC: 142 MMOL/L (ref 134–144)
TIBC SERPL-MCNC: 169 UG/DL (ref 250–450)
UIBC SERPL-MCNC: 145 UG/DL (ref 111–343)
WBC # BLD AUTO: 1.7 K/UL (ref 4.5–13)
WBC # BLD AUTO: 2 K/UL (ref 4.5–13)
WBC # BLD AUTO: 2.6 K/UL (ref 4.5–13)
WBC # BLD AUTO: 2.9 K/UL (ref 4.5–13)
WBC # BLD AUTO: 3.2 K/UL (ref 4.5–13)
WBC # BLD AUTO: 3.2 K/UL (ref 4.5–13)
WBC # BLD AUTO: 5.5 K/UL (ref 4.5–13)

## 2020-01-01 PROCEDURE — 85025 COMPLETE CBC W/AUTO DIFF WBC: CPT

## 2020-01-01 PROCEDURE — 74011250636 HC RX REV CODE- 250/636: Performed by: NURSE PRACTITIONER

## 2020-01-01 PROCEDURE — 36415 COLL VENOUS BLD VENIPUNCTURE: CPT

## 2020-01-01 PROCEDURE — 96372 THER/PROPH/DIAG INJ SC/IM: CPT

## 2020-01-01 PROCEDURE — 85049 AUTOMATED PLATELET COUNT: CPT

## 2020-01-01 PROCEDURE — 74011250636 HC RX REV CODE- 250/636: Performed by: INTERNAL MEDICINE

## 2020-01-01 RX ORDER — SODIUM CHLORIDE 0.9 % (FLUSH) 0.9 %
10 SYRINGE (ML) INJECTION AS NEEDED
Status: CANCELLED
Start: 2020-01-01

## 2020-01-01 RX ORDER — HEPARIN 100 UNIT/ML
300-500 SYRINGE INTRAVENOUS AS NEEDED
Status: CANCELLED
Start: 2020-01-01

## 2020-01-01 RX ORDER — SODIUM CHLORIDE 9 MG/ML
10 INJECTION INTRAMUSCULAR; INTRAVENOUS; SUBCUTANEOUS AS NEEDED
Status: CANCELLED | OUTPATIENT
Start: 2020-01-01

## 2020-01-01 RX ADMIN — EPOETIN ALFA-EPBX 60000 UNITS: 40000 INJECTION, SOLUTION INTRAVENOUS; SUBCUTANEOUS at 11:57

## 2020-01-01 RX ADMIN — EPOETIN ALFA-EPBX 60000 UNITS: 40000 INJECTION, SOLUTION INTRAVENOUS; SUBCUTANEOUS at 12:25

## 2020-01-01 RX ADMIN — EPOETIN ALFA-EPBX 60000 UNITS: 40000 INJECTION, SOLUTION INTRAVENOUS; SUBCUTANEOUS at 11:35

## 2020-01-07 ENCOUNTER — HOSPITAL ENCOUNTER (OUTPATIENT)
Dept: INFUSION THERAPY | Age: 85
Discharge: HOME | End: 2020-01-07
Payer: MEDICARE

## 2020-01-07 VITALS
HEART RATE: 63 BPM | RESPIRATION RATE: 18 BRPM | TEMPERATURE: 98.3 F | DIASTOLIC BLOOD PRESSURE: 70 MMHG | OXYGEN SATURATION: 100 % | SYSTOLIC BLOOD PRESSURE: 146 MMHG

## 2020-01-07 DIAGNOSIS — C67.9 MALIGNANT NEOPLASM OF URINARY BLADDER, UNSPECIFIED SITE (HCC): ICD-10-CM

## 2020-01-07 DIAGNOSIS — D63.1 ANEMIA DUE TO STAGE 4 CHRONIC KIDNEY DISEASE (HCC): Primary | ICD-10-CM

## 2020-01-07 DIAGNOSIS — N18.4 ANEMIA DUE TO STAGE 4 CHRONIC KIDNEY DISEASE (HCC): Primary | ICD-10-CM

## 2020-01-07 DIAGNOSIS — D46.9 MYELODYSPLASTIC SYNDROME (HCC): ICD-10-CM

## 2020-01-07 LAB
BASO+EOS+MONOS # BLD AUTO: 0.3 K/UL (ref 0–2.3)
BASO+EOS+MONOS NFR BLD AUTO: 16 % (ref 0.1–17)
DIFFERENTIAL METHOD BLD: ABNORMAL
ERYTHROCYTE [DISTWIDTH] IN BLOOD BY AUTOMATED COUNT: 18.7 % (ref 11.5–14.5)
HCT VFR BLD AUTO: 27.2 % (ref 36–48)
HGB BLD-MCNC: 7.7 G/DL (ref 12–16)
LYMPHOCYTES # BLD: 0.7 K/UL (ref 1.1–5.9)
LYMPHOCYTES NFR BLD: 38 % (ref 14–44)
MCH RBC QN AUTO: 23 PG (ref 25–35)
MCHC RBC AUTO-ENTMCNC: 28.3 G/DL (ref 31–37)
MCV RBC AUTO: 81.2 FL (ref 78–102)
NEUTS SEG # BLD: 0.9 K/UL (ref 1.8–9.5)
NEUTS SEG NFR BLD: 47 % (ref 40–70)
PLATELET # BLD AUTO: 162 K/UL (ref 140–440)
RBC # BLD AUTO: 3.35 M/UL (ref 4.1–5.1)
WBC # BLD AUTO: 1.9 K/UL (ref 4.5–13)

## 2020-01-07 PROCEDURE — 36415 COLL VENOUS BLD VENIPUNCTURE: CPT

## 2020-01-07 PROCEDURE — 85025 COMPLETE CBC W/AUTO DIFF WBC: CPT

## 2020-01-07 PROCEDURE — 96372 THER/PROPH/DIAG INJ SC/IM: CPT

## 2020-01-07 PROCEDURE — 74011250636 HC RX REV CODE- 250/636: Performed by: NURSE PRACTITIONER

## 2020-01-07 RX ORDER — SODIUM CHLORIDE 0.9 % (FLUSH) 0.9 %
10 SYRINGE (ML) INJECTION AS NEEDED
Status: CANCELLED
Start: 2020-01-14

## 2020-01-07 RX ORDER — SODIUM CHLORIDE 9 MG/ML
10 INJECTION INTRAMUSCULAR; INTRAVENOUS; SUBCUTANEOUS AS NEEDED
Status: CANCELLED | OUTPATIENT
Start: 2020-01-14

## 2020-01-07 RX ORDER — HEPARIN 100 UNIT/ML
300-500 SYRINGE INTRAVENOUS AS NEEDED
Status: CANCELLED
Start: 2020-01-14

## 2020-01-07 RX ADMIN — EPOETIN ALFA-EPBX 60000 UNITS: 40000 INJECTION, SOLUTION INTRAVENOUS; SUBCUTANEOUS at 10:04

## 2020-01-07 NOTE — PROGRESS NOTES
[facility] Progress Note    Date: 2020    Name: Papito Prescott    MRN: [id number]         : 1932    Retacrit/cbc     Mr. Jolan Duane arrived to [facility] at [address] [place] [address] ambulatory for rescheduled visit. No complaints or concerns voiced    Mr. Jolan Duane was assessed and education was provided. Mr. Nora Nixon vitals were reviewed. Visit Vitals  /70 (BP 1 Location: Left arm, BP Patient Position: Sitting)   Pulse 63   Temp 98.3 °F (36.8 °C)   Resp 18   SpO2 100%       Blood drawn for labs via left  FA venipuncture on the 1st attempt. Lab results were obtained and reviewed. Recent Results (from the past 12 hour(s))   CBC WITH 3 PART DIFF    Collection Time: 20  9:45 AM   Result Value Ref Range    WBC 1.9 (L) 4.5 - 13.0 K/uL    RBC 3.35 (L) 4.10 - 5.10 M/uL    HGB 7.7 (L) 12.0 - 16 g/dL    HCT 27.2 (L) 36 - 48 %    MCV 81.2 78 - 102 FL    MCH 23.0 (L) 25.0 - 35.0 PG    MCHC 28.3 (L) 31 - 37 g/dL    RDW 18.7 (H) 11.5 - 14.5 %    PLATELET 391 444 - 823 K/uL    NEUTROPHILS 47 40 - 70 %    MIXED CELLS 16 0.1 - 17 %    LYMPHOCYTES 38 14 - 44 %    ABS. NEUTROPHILS 0.9 (L) 1.8 - 9.5 K/UL    ABS. MIXED CELLS 0.3 0.0 - 2.3 K/uL    ABS. LYMPHOCYTES 0.7 (L) 1.1 - 5.9 K/UL    DF AUTOMATED       C Otoo NP aware of h/h=7.7/27.2 and that patient is asymptomatic. No order recieved      Retacrit injection 60,000 units given SQ to upper back of left arm in divided doses. Tolerated well. Bandaids to sites    Mr. Matta tolerated well without complaints. Mr. Jolan Duane was discharged from [place] [address] in stable condition at 1010. He is to return on 2020 at 1100 for his next appointment.     Daniela Broussard RN  2020

## 2020-01-21 NOTE — PROGRESS NOTES
No show for appt today. I spoke to family member who stated that Mr Osmel Cabrera will not be able to come for treatment today, because of family emergency. She have kept the next appt for 2 weeks. She was  encouraged to move the appt earlier if need be.

## 2020-02-04 NOTE — PROGRESS NOTES
[facility] Progress Note    Date: 2020    Name: Moni Ascencio    MRN: [id number]         : 1932    Retacrit/cbc     Mr. Yenny Sutherland arrived to [facility] at [phone] ambulatory for rescheduled visit. No complaints or concerns voiced    Mr. Yenny Sutherland was assessed and education was provided. Mr. Sulma Malave vitals were reviewed. Visit Vitals  /52 (BP 1 Location: Left arm, BP Patient Position: Sitting)   Pulse 61   Temp 98.6 °F (37 °C)   Resp 18   SpO2 97%       Blood drawn for labs via left  AC venipuncture on the 1st attempt. Lab results were obtained and reviewed. Recent Results (from the past 12 hour(s))   CBC WITH 3 PART DIFF    Collection Time: 20 11:40 AM   Result Value Ref Range    WBC 1.7 (L) 4.5 - 13.0 K/uL    RBC 3.54 (L) 4.10 - 5.10 M/uL    HGB 8.3 (L) 12.0 - 16 g/dL    HCT 29.0 (L) 36 - 48 %    MCV 81.9 78 - 102 FL    MCH 23.4 (L) 25.0 - 35.0 PG    MCHC 28.6 (L) 31 - 37 g/dL    RDW 17.9 (H) 11.5 - 14.5 %    PLATELET 169 585 - 667 K/uL    NEUTROPHILS 48 40 - 70 %    MIXED CELLS 12 0.1 - 17 %    LYMPHOCYTES 40 14 - 44 %    ABS. NEUTROPHILS 0.8 (L) 1.8 - 9.5 K/UL    ABS. MIXED CELLS 0.2 0.0 - 2.3 K/uL    ABS. LYMPHOCYTES 0.7 (L) 1.1 - 5.9 K/UL    DF AUTOMATED             Retacrit injection 60,000 units given SQ to upper back of right arm in divided doses. Tolerated well. No bleeding at sites. Patient declined bandaids    Mr. Matta tolerated well without complaints. Mr. Yenny Sutherland was discharged from [place] [address] in stable condition at 1200. He is to return on 2020 at 36 for his next appointment.     En Sadler RN  2020

## 2020-02-18 NOTE — PROGRESS NOTES
[facility] Progress Note Date: 2020 Name: Leandro Ibanez MRN: [id number] : 1932 Retacrit/cbc Mr. Nury Chin arrived to [facility] at [phone] ambulatory. No complaints or concerns voiced Mr. Nury Chin was assessed and education was provided. Mr. Maya Aguirre vitals were reviewed. Visit Vitals /64 (BP 1 Location: Left arm, BP Patient Position: Sitting) Pulse (!) 58 Temp 97.9 °F (36.6 °C) Resp 18 SpO2 99% Blood drawn for labs via left  AC venipuncture on the 1st attempt. Lab results were obtained and reviewed. Recent Results (from the past 12 hour(s)) CBC WITH 3 PART DIFF Collection Time: 20 11:30 AM  
Result Value Ref Range WBC 2.0 (L) 4.5 - 13.0 K/uL  
 RBC 4.20 4. 10 - 5.10 M/uL HGB 9.8 (L) 12.0 - 16 g/dL HCT 34.4 (L) 36 - 48 % MCV 81.9 78 - 102 FL  
 MCH 23.3 (L) 25.0 - 35.0 PG  
 MCHC 28.5 (L) 31 - 37 g/dL  
 RDW 18.4 (H) 11.5 - 14.5 % NEUTROPHILS 49 40 - 70 % MIXED CELLS 14 0.1 - 17 % LYMPHOCYTES 37 14 - 44 % ABS. NEUTROPHILS 1.0 (L) 1.8 - 9.5 K/UL  
 ABS. MIXED CELLS 0.3 0.0 - 2.3 K/uL  
 ABS. LYMPHOCYTES 0.7 (L) 1.1 - 5.9 K/UL  
 DF AUTOMATED Retacrit held per parameters Mr. Matta tolerated brock-puncture well without complaints. Mr. Nury Chin was discharged from [place] [address] in stable condition at 1140. He is to return on 3/3/2020 at 36 for his next appointment. Sheri Jonas RN 2020

## 2020-03-04 NOTE — PROGRESS NOTES
[facility] Progress Note    Date: 2020    Name: Talisha Abdi. MRN: [id number]         : 1932    Retacrit/cbc     Mr. Angelica Epstein arrived to [facility] at [phone]. No complaints or concerns voiced    Mr. Angelica Epstein was assessed and education was provided. Mr. Harmony Gaffney vitals were reviewed. Visit Vitals  /63 (BP 1 Location: Left arm, BP Patient Position: Sitting)   Pulse 71   Temp 99 °F (37.2 °C)   Resp 16   SpO2 99%       Lab results were obtained and reviewed. Recent Results (from the past 12 hour(s))   CBC WITH 3 PART DIFF    Collection Time: 20 10:39 AM   Result Value Ref Range    WBC 5.5 4.5 - 13.0 K/uL    RBC 3.55 (L) 4.10 - 5.10 M/uL    HGB 8.4 (L) 12.0 - 16 g/dL    HCT 28.5 (L) 36 - 48 %    MCV 80.3 78 - 102 FL    MCH 23.7 (L) 25.0 - 35.0 PG    MCHC 29.5 (L) 31 - 37 g/dL    RDW 16.9 (H) 11.5 - 14.5 %    PLATELET 820 259 - 703 K/uL    NEUTROPHILS 78 (H) 40 - 70 %    MIXED CELLS 10 0.1 - 17 %    LYMPHOCYTES 12 (L) 14 - 44 %    ABS. NEUTROPHILS 4.3 1.8 - 9.5 K/UL    ABS. MIXED CELLS 0.5 0.0 - 2.3 K/uL    ABS. LYMPHOCYTES 0.7 (L) 1.1 - 5.9 K/UL    DF AUTOMATED         Retacrit 60,000 units administered in divided doses to left upper arm. Band aid applied. Pt tolerated well. Mr. Angelica Epstein was discharged from [place] [address] in stable condition at 1140. He is to return on 3/17/2020 at 1430 for his next appointment.     Saumya Ayers RN  2020

## 2020-03-04 NOTE — PROGRESS NOTES
Hematology/Oncology  Progress Note Name: Elise Sharma. Date: 3/4/2020 : 1932 PCP: Sophie Jimenez NP  
 
Mr. Saint Hatcher is a 80 y.o. man who is seen for myriad medical problems including myelodysplastic syndrome and chronic anemia. Current therapy: Procrit 60,000 units every 4 weeks whenever the hemoglobin is below 10 g/dL and hematocrit is below 30%. Subjective:  
 
Mr. Saint Hatcher is an 15-year-old -American man who has a long-standing history of myelodysplastic syndrome. He also has chronic renal insufficiency and thus chronic anemia. The patient has long-standing arthritic discomfort in his joints. He is currently using a wheelchair mobility support. Previously, per his request, I referred him to a rheumatologist since he has what appears to be gout arthritis. He denies any other concern or issues at this time, except for his arthritic discomfort. Past medical history, family history, and social history: these were reviewed and remains unchanged. Past Medical History:  
Diagnosis Date  Arthritis  Bladder cancer ([facility]  Diabetes ([facility] INsulin dependent  Gout  H/O CHF   
 acute  Myelodysplastic syndrome, unspecified ([facility]  Personal history of prostate cancer  Renal cyst   
 
Past Surgical History:  
Procedure Laterality Date  HX BACK SURGERY    
 HX CATARACT REMOVAL    
 HX HERNIA REPAIR    
 HX KNEE REPLACEMENT Social History Socioeconomic History  Marital status:  Spouse name: Not on file  Number of children: Not on file  Years of education: Not on file  Highest education level: Not on file Occupational History  Not on file Social Needs  Financial resource strain: Not on file  Food insecurity:  
  Worry: Not on file Inability: Not on file  Transportation needs:  
  Medical: Not on file Non-medical: Not on file Tobacco Use  Smoking status: Former Smoker Types: Cigarettes Last attempt to quit: 1994 Years since quittin.8  Smokeless tobacco: Never Used Substance and Sexual Activity  Alcohol use: No  
 Drug use: No  
 Sexual activity: Not on file Lifestyle  Physical activity:  
  Days per week: Not on file Minutes per session: Not on file  Stress: Not on file Relationships  Social connections:  
  Talks on phone: Not on file Gets together: Not on file Attends Bahai service: Not on file Active member of club or organization: Not on file Attends meetings of clubs or organizations: Not on file Relationship status: Not on file  Intimate partner violence:  
  Fear of current or ex partner: Not on file Emotionally abused: Not on file Physically abused: Not on file Forced sexual activity: Not on file Other Topics Concern  Not on file Social History Narrative  Not on file Family History Problem Relation Age of Onset  Diabetes Mother  Stroke Mother Current Outpatient Medications Medication Sig Dispense Refill  trazodone HCl (TRAZODONE PO) Take  by mouth.  bumetanide (BUMEX) 2 mg tablet 2 mg.  clopidogrel (PLAVIX) 75 mg tab 75 mg.    
 rosuvastatin (CRESTOR) 20 mg tablet 20 mg.    
 isosorbide dinitrate (ISORDIL) 30 mg tablet Take 20 mg by mouth four (4) times daily.  colchicine 0.6 mg tablet take 1 tablet by mouth once daily  0  
 ferrous sulfate 325 mg (65 mg iron) tablet TAKE 1 TABLET BY MOUTH ONCE DAILY WITH BREAKFAST. TAKE WITH VITAMIN C ON EMPTY STOMACH  0  
 ergocalciferol (VITAMIN D2) 50,000 unit capsule Take 1 Cap by mouth every seven (7) days. Indications: Vitamin D Deficiency 12 Cap 0  
 allopurinol (ZYLOPRIM) 100 mg tablet Take  by mouth daily.  carvedilol (COREG) 25 mg tablet take 1 tablet by mouth twice a day  0  
 aspirin delayed-release 81 mg tablet Take  by mouth daily.  nitroglycerin (NITROSTAT) 0.4 mg SL tablet by SubLINGual route every five (5) minutes as needed.  gabapentin (NEURONTIN) 300 mg capsule Take 300 mg by mouth three (3) times daily. Review of Systems Constitutional: The patient has no acute distress or discomfort. HEENT: The patient denies recent head trauma, eye pain, blurred vision,  hearing deficit, oropharyngeal mucosal pain or lesions, and the patient denies throat pain or discomfort. Lymphatics: The patient denies palpable peripheral lymphadenopathy. Hematologic: The patient denies having bruising, bleeding, or progressive fatigue. Respiratory: Patient denies having shortness of breath, cough, sputum production, fever, or dyspnea on exertion. Cardiovascular: The patient denies having leg pain, leg swelling, heart palpitations, chest permit, chest pain, or lightheadedness. The patient denies having dyspnea on exertion. Gastrointestinal: The patient denies having nausea, emesis, or diarrhea. The patient denies having any hematemesis or blood in the stool. Genitourinary: Patient denies having urinary urgency, frequency, or dysuria. The patient denies having blood in the urine. Psychological: The patient denies having symptoms of nervousness, anxiety, depression, or thoughts of harming self. Skin: Patient denies having skin rashes, skin, ulcerations, or unexplained itching or pruritus. Musculoskeletal: The patient denies having pain in the joints or bones. Objective:  
 
Visit Vitals BP 99/46 Pulse 68 Temp 98.7 °F (37.1 °C) (Oral) Resp 16 Ht 6' (1.829 m) SpO2 98% BMI 31.39 kg/m² ECOG PS=1; pain score=2/10 Physical Exam:  
Gen. Appearance: The patient is in no acute distress. Skin: There is no bruise or rash. HEENT: The exam is unremarkable. Neck: Supple without lymphadenopathy or thyromegaly. Lungs: Clear to auscultation and percussion; there are no wheezes or rhonchi.   Heart: Regular rate and rhythm; there are no murmurs, gallops, or rubs. Abdomen: Bowel sounds are present and normal.  There is no guarding, tenderness, or hepatosplenomegaly. Extremities: There is no clubbing, cyanosis, or edema. Neurologic: There are no focal neurologic deficits. Lymphatics: There is no palpable peripheral lymphadenopathy. Musculoskeletal: The patient has full range of motion at all joints. There is no evidence of joint deformity or effusions. There is focal swelling and tenderness over the right hand and wrist. Psychological/psychiatric: There is no clinical evidence of anxiety, depression, or melancholy. Lab data: 
   
Results for orders placed or performed during the hospital encounter of 02/18/20 CBC WITH 3 PART DIFF     Status: Abnormal  
Result Value Ref Range Status WBC 2.0 (L) 4.5 - 13.0 K/uL Final  
 RBC 4.20 4. 10 - 5.10 M/uL Final  
 HGB 9.8 (L) 12.0 - 16 g/dL Final  
 HCT 34.4 (L) 36 - 48 % Final  
 MCV 81.9 78 - 102 FL Final  
 MCH 23.3 (L) 25.0 - 35.0 PG Final  
 MCHC 28.5 (L) 31 - 37 g/dL Final  
 RDW 18.4 (H) 11.5 - 14.5 % Final  
 NEUTROPHILS 49 40 - 70 % Final  
 MIXED CELLS 14 0.1 - 17 % Final  
 LYMPHOCYTES 37 14 - 44 % Final  
 ABS. NEUTROPHILS 1.0 (L) 1.8 - 9.5 K/UL Final  
 ABS. MIXED CELLS 0.3 0.0 - 2.3 K/uL Final  
 ABS. LYMPHOCYTES 0.7 (L) 1.1 - 5.9 K/UL Final  
  Comment: Test performed at [address] [place] [address] or Outpatient Infusion Center Location. Reviewed by Medical Director. DF AUTOMATED   Final  
 
 
   
Assessment: 1. Anemia due to stage 4 chronic kidney disease ([address] 2. Myelodysplastic syndrome ([address] 3. Thrombocytopenia ([address] 4. Vitamin D deficiency 5. Gouty arthritis of right hand 6. Arthritis 7. Prostate cancer ([address] 8. Malignant neoplasm of posterior wall of urinary bladder (HCC) Plan: Myelodysplastic syndrome: I have explained to the patient that his CBC CBC done on 2/26/2020 showed a WBC count of 3.3, hemoglobin was 8.4 g/dL, hematocrit was 29.2%, and the platelet count was 704,899. There is a standing order in the outpatient infusion center at the hospital for him to receive Retacrit at a dose of 60,000 units whenever his hemoglobin is below 10 g/dL and hematocrit is below 30%. The CBC from today is pending. We will continue to monitor his CBC at 3 month intervals. An iron profile and ferritin level will be ordered along with a comprehensive metabolic panel. Leukopenia: The current CBC shows that his WBC count is relatively stable at 3.3, from a blood test done on 2/26/2020. The absolute neutrophil count is 1.7. I have explained to the patient that if his absolute neutrophil count declines below 0.5 we will start him on treatment with Neupogen. Pt denies any fevers, unexplained infections or rash. Loida Steffen Thrombocytopenia: The CBC from today is not available at this time. Previous  platelet counts was relatively stable at 253,000 from 2/26/2020. Therapeutic intervention not necessary unless the platelet counts decline below 30,000. History of prostate cancer: The patient is clinically stable. The patient continues to receive Eligard every 4 months. On 11/26/2019 the PSA was down to 1.5 ng/mL. I will order a PSA level today. History of bladder cancer:  He continues to have routine follow-up appts with the urologist Dr Aby Gonzalez. Gouty arthritis/chronic arthritis: The patient is using Tramadol which provides adequate relief. This will continue. He is using a wheelchair or crutch mobility support. We previously referred the patient to a different rheumatologist for an assessment to assist in the management of his chronic arthritis and gouty arthritis. Of note: The patient's blood pressure was on the low side today.   I advised him not to take the blood pressure medicine for today and to call his primary care physician for recommendations regarding resumption or whether or not there needs to be a change or modification in his dose. I will have the patient return in 3 months for a complete assessment or sooner if indicated. Orders Placed This Encounter  COMPLETE CBC & AUTO DIFF WBC  InHouse CBC ([facility]) Standing Status:   Future Number of Occurrences:   1 Standing Expiration Date:   3/11/2020  METABOLIC PANEL, COMPREHENSIVE Standing Status:   Future Standing Expiration Date:   3/5/2021  
 IRON PROFILE Standing Status:   Future Standing Expiration Date:   3/5/2021  FERRITIN Standing Status:   Future Standing Expiration Date:   3/4/2021  VITAMIN D, 25 HYDROXY Standing Status:   Future Standing Expiration Date:   3/4/2021  
 PROSTATE SPECIFIC AG Standing Status:   Future Standing Expiration Date:   3/4/2021 Dustin Camacho MD 
3/4/2020 Please note: This document has been produced using voice recognition software. Unrecognized errors in transcription may be present.

## 2020-05-31 NOTE — PROGRESS NOTES
Hematology/Oncology  Progress Note Name: Cynthia Morris Date: 2020 : 1932 PCP: Lakisha Grimm NP  
 
Mr. Jayme Prince is a 80 y.o. man who is seen for myriad medical problems including myelodysplastic syndrome and chronic anemia. Current therapy: Procrit 60,000 units every 4 weeks whenever the hemoglobin is below 10 g/dL and hematocrit is below 30%. Subjective:  
 
Mr. Jayme Prince is an 60-year-old -American man who has a long-standing history of myelodysplastic syndrome after bone marrow biopsied by Dr. Sonia Conde. He was accompanied by his daughter today. He also has chronic renal insufficiency and thus chronic anemia. The patient has long-standing arthritic discomfort in his joints. Recently he was admitted for hematuria, likely related to cystitis. He will see urology for follow-up tomorrow. Today he reported feeling better. He denied any further distress. Denied fever, chills, night sweat, unintentional weight loss, skin lumps or bumps, acute bleeding or bruising issues. Denied headache, acute vision change, dizziness, chest pain, worsen shortness of breath, palpitation, productive cough, nausea, vomiting, abdominal pain, altered bowel habits, hematuria, or worsening dysuria, focal numbness or weakness. She 
Past medical history, family history, and social history: these were reviewed and remains unchanged. Past Medical History:  
Diagnosis Date  Arthritis  Bladder cancer ([facility] [address]  Diabetes ([facility] [address] INsulin dependent  Gout  H/O CHF   
 acute  Myelodysplastic syndrome, unspecified ([facility] [address]  Personal history of prostate cancer  Renal cyst   
 
Past Surgical History:  
Procedure Laterality Date  HX BACK SURGERY    
 HX CATARACT REMOVAL    
 HX HERNIA REPAIR    
 HX KNEE REPLACEMENT Social History Socioeconomic History  Marital status:  Spouse name: Not on file  Number of children: Not on file  Years of education: Not on file  Highest education level: Not on file Occupational History  Not on file Social Needs  Financial resource strain: Not on file  Food insecurity Worry: Not on file Inability: Not on file  Transportation needs Medical: Not on file Non-medical: Not on file Tobacco Use  Smoking status: Former Smoker Types: Cigarettes Last attempt to quit: 1994 Years since quittin.0  Smokeless tobacco: Never Used Substance and Sexual Activity  Alcohol use: No  
 Drug use: No  
 Sexual activity: Not on file Lifestyle  Physical activity Days per week: Not on file Minutes per session: Not on file  Stress: Not on file Relationships  Social connections Talks on phone: Not on file Gets together: Not on file Attends Hinduism service: Not on file Active member of club or organization: Not on file Attends meetings of clubs or organizations: Not on file Relationship status: Not on file  Intimate partner violence Fear of current or ex partner: Not on file Emotionally abused: Not on file Physically abused: Not on file Forced sexual activity: Not on file Other Topics Concern  Not on file Social History Narrative  Not on file Family History Problem Relation Age of Onset  Diabetes Mother  Stroke Mother Current Outpatient Medications Medication Sig Dispense Refill  trazodone HCl (TRAZODONE PO) Take  by mouth.  bumetanide (BUMEX) 2 mg tablet 2 mg.  clopidogrel (PLAVIX) 75 mg tab 75 mg.    
 rosuvastatin (CRESTOR) 20 mg tablet 20 mg.    
 isosorbide dinitrate (ISORDIL) 30 mg tablet Take 20 mg by mouth four (4) times daily.  colchicine 0.6 mg tablet take 1 tablet by mouth once daily  0  
 ferrous sulfate 325 mg (65 mg iron) tablet TAKE 1 TABLET BY MOUTH ONCE DAILY WITH BREAKFAST.  TAKE WITH VITAMIN C ON EMPTY STOMACH  0  
  ergocalciferol (VITAMIN D2) 50,000 unit capsule Take 1 Cap by mouth every seven (7) days. Indications: Vitamin D Deficiency 12 Cap 0  
 allopurinol (ZYLOPRIM) 100 mg tablet Take  by mouth daily.  carvedilol (COREG) 25 mg tablet take 1 tablet by mouth twice a day  0  
 aspirin delayed-release 81 mg tablet Take  by mouth daily.  nitroglycerin (NITROSTAT) 0.4 mg SL tablet by SubLINGual route every five (5) minutes as needed.  gabapentin (NEURONTIN) 300 mg capsule Take 300 mg by mouth three (3) times daily. Review of Systems Constitutional: Negative for chills, diaphoresis, fever, malaise/fatigue and weight loss. Respiratory: Negative for cough, hemoptysis, shortness of breath and wheezing. Cardiovascular: Negative for chest pain, palpitations and leg swelling. Gastrointestinal: Negative for abdominal pain, diarrhea, heartburn, nausea and vomiting. Genitourinary: Negative for dysuria, frequency, hematuria and urgency. Musculoskeletal: Negative for joint pain and myalgias. Skin: Negative for itching and rash. Neurological: Negative for dizziness, seizures, weakness and headaches. Psychiatric/Behavioral: Negative for depression. The patient does not have insomnia. Objective:  
 
Visit Vitals /72 (BP Patient Position: Sitting) Pulse 71 Temp 98.5 °F (36.9 °C) (Oral) Resp 16 Wt 106.6 kg (235 lb) SpO2 100% BMI 31.87 kg/m² ECOG Performance Status (grade): 1 
0 - able to carry on all pre-disease activity w/out restriction 1 - restricted but able to carry out light work 2 - ambulatory and can self- care but unable to carry out work 3 - bed or chair >50% of waking hours 4 - completely disable, total care, confined to bed or chair Physical Exam 
Constitutional:   
   General: He is not in acute distress. HENT:  
   Head: Normocephalic and atraumatic. Eyes:  
   Pupils: Pupils are equal, round, and reactive to light. Neck: Musculoskeletal: Neck supple. No neck rigidity. Cardiovascular:  
   Pulses: Normal pulses. Heart sounds: Normal heart sounds. No murmur. Pulmonary:  
   Effort: Pulmonary effort is normal. No respiratory distress. Breath sounds: Normal breath sounds. Abdominal:  
   General: Bowel sounds are normal. There is no distension. Palpations: Abdomen is soft. There is no mass. Tenderness: There is no abdominal tenderness. There is no guarding. Musculoskeletal:     
   General: No tenderness. Lymphadenopathy:  
   Cervical: No cervical adenopathy. Skin: 
   General: Skin is warm. Findings: No rash. Neurological:  
   Mental Status: He is alert and oriented to person, place, and time. Mental status is at baseline. Cranial Nerves: No cranial nerve deficit. Diagnostics: No results found for this or any previous visit (from the past 96 hour(s)). Imaging: No results found for this or any previous visit. No results found for this or any previous visit. Results for orders placed in visit on 19 CT ABD PELV W CONT [address] [facility] Cat Scan 
[address] 
[phone]  
  
Imaging Result 
  
Name:   
Niecy Campos ([id number]) Sex: Male : 
1932 
   
Ordering Provider: Sanaz SELBY Provider:   
  
  Diagnosis: 
Prostate cancer ([facility] [address] Kimi (ICD-10-CM)] None Specified 
  
  
      
Procedures Performed: Exam Date/Time:   
CT ABD/PELVIS-IV AND ORAL 
  2019  1:42 PM     
  
  
Impression 
  
No CT evidence of metastatic disease in the abdomen/pelvis. 
  
Mild gallbladder wall thickening most likely artifactual due to contraction/incomplete distention of the gallbladder. Heterogeneity within the gallbladder lumen likely related to gallstones.  Ultrasound could be considered for further evaluation if there is clinical concern. 
  
 Additional chronic/incidental findings as described. 
  
  
  
Signed By: Usman Manuel MD on 3/26/2019 8:14 AM 
  
  
Narrative EXAM: CT ABD/PELVIS-IV AND ORAL 
  
CLINICAL INDICATION/HISTORY: History of prostate cancer, follow-up 
  
COMPARISON: 6/5/2018 
  
TECHNIQUE: Helical CT imaging of the abdomen and pelvis was performed with intravenous contrast. Multiplanar reformats were generated. One or more dose reduction techniques were used on this CT: automated exposure control, adjustment of the mAs and/or kVp according to patient size, and iterative reconstruction techniques.  The specific techniques used on this CT exam have been documented in the patient's electronic medical record.  Digital Imaging and Communications in Medicine (DICOM) format image data are available to nonaffiliated external healthcare facilities or entities on a secure, media free, reciprocally searchable basis with patient authorization for at least a 12-month period after this study. 
  
_______________ 
  
FINDINGS: 
  
LOWER CHEST: No acute finding. Linear bands of minor scarring and/or atelectasis in the lung bases. Mild cardiomegaly. . 
  
LIVER, BILIARY: Liver is normal. No biliary dilation. Gallbladder demonstrates mild wall thickening and internal heterogeneity likely representing gallstones. . 
  
PANCREAS: Normal. 
  
SPLEEN: Normal. 
  
ADRENALS: Normal. 
  
KIDNEYS/URETERS/BLADDER: Bilateral simple appearing small cysts. Otherwise unremarkable. . 
  
PELVIC ORGANS: Unremarkable. 
  
LYMPH NODES: No enlarged lymph nodes. 
  
GASTROINTESTINAL TRACT: No bowel dilation or wall thickening. 
  
VASCULATURE: No AAA. 
  
BONES: No acute or aggressive osseous abnormalities identified. L3-S1 fusion hardware is intact.  Mild compression deformity of L2 inferior endplate and adjacent sclerotic change again noted, stable. 
  
OTHER: None. 
  
_______________ 
  
  
Dictated by: Bridget Denis on Tue Mar 26, 2019  8:08:05 AM EDT  
  
 Signed By:Jessa Zepeda MD 
 3/26/2019  8:14 AM 
  
  
  
  
  
 
  
[facility] [[id number]] ~~This report was copied and pasted from the servicing EHR system. ~~ 
  
 
   
 
 
Results for orders placed or performed during the hospital encounter of 03/04/20 CBC WITH 3 PART DIFF     Status: Abnormal  
Result Value Ref Range Status WBC 5.5 4.5 - 13.0 K/uL Final  
 RBC 3.55 (L) 4.10 - 5.10 M/uL Final  
 HGB 8.4 (L) 12.0 - 16 g/dL Final  
 HCT 28.5 (L) 36 - 48 % Final  
 MCV 80.3 78 - 102 FL Final  
 MCH 23.7 (L) 25.0 - 35.0 PG Final  
 MCHC 29.5 (L) 31 - 37 g/dL Final  
 RDW 16.9 (H) 11.5 - 14.5 % Final  
 PLATELET 232 346 - 709 K/uL Final  
 NEUTROPHILS 78 (H) 40 - 70 % Final  
 MIXED CELLS 10 0.1 - 17 % Final  
 LYMPHOCYTES 12 (L) 14 - 44 % Final  
 ABS. NEUTROPHILS 4.3 1.8 - 9.5 K/UL Final  
 ABS. MIXED CELLS 0.5 0.0 - 2.3 K/uL Final  
 ABS. LYMPHOCYTES 0.7 (L) 1.1 - 5.9 K/UL Final  
  Comment: Test performed at [address] [place] [address] or Outpatient Infusion Center Location. Reviewed by Medical Director. DF AUTOMATED   Final  
 
 
   
Assessment:  
 
1. Myelodysplastic syndrome ([address] 2. Prostate cancer ([address] 3. Thrombocytopenia ([address] 4. 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 Anemia due to stage 4 chronic kidney disease ([address] 6. Gouty arthritis of right hand Plan: # Myelodysplastic syndrome, Anemia:  
--The patient has a long history of MDS after a bone marrow biopsy performed by Dr. Ruddy Luu. He has been receiving Retacrit at a dose of 60,000 units whenever his hemoglobin is below 10 g/dL and hematocrit is below 30%. The patient would like to continue current regimen. -- We will continue to monitor CBC. # Leukopenia:  
--His neutropenia has been fluctuating around 1K. He denied any recurrent infections. Probably is related to his MDS. --We will continue to monitor # Thrombocytopenia:  
 -- Previous  platelet counts was relatively stable at 253,000 from 2/26/2020. Therapeutic intervention not necessary unless the platelet counts decline below 30,000. # History of prostate cancer:  
-- The patient is clinically stable. -- The patient continues to receive Eligard every 4 months. -- I will order a PSA level today. -- He will also follow-up with his urology tomorrow # Recent hematuria, history of bladder cancer:   
--He was admitted to the hospital recently for hematuria, likely related to cystitis. -- He continues to have routine follow-up appts with the urologist tomorrow. # Gouty arthritis/chronic arthritis:  
-- The patient is using Tramadol which provides adequate relief. We previously referred the patient to a different rheumatologist for an assessment to assist in the management of his chronic arthritis and gouty arthritis. -- I will see the patient back in clinic in about 3 months. Always sooner if required. The patient can have lab done prior our next clinic visit. Orders Placed This Encounter  METABOLIC PANEL, COMPREHENSIVE Standing Status:   Future Number of Occurrences:   1 Standing Expiration Date:   6/4/2021  
 PROSTATE SPECIFIC AG Standing Status:   Future Number of Occurrences:   1 Standing Expiration Date:   6/4/2021 Mr. Giles Dillard has a reminder for a \"due or due soon\" health maintenance. I have asked that he contact his primary care provider for follow-up on this health maintenance. All of patient's questions answered to their apparent satisfaction. They verbally show understanding and agreement with aforementioned plan. Mckayla Green MD 
6/4/2020 About 25 minutes were spent for this encounter with more than 50% of the time spent in face-to-face counseling, discussing on diagnosis and management plan going forward, and co-ordination of care. Parts of this document has been produced using Dragon dictation system. Unrecognized errors in transcription may be present. Please do not hesitate to reach out for any questions or clarifications.  
 
 
CC: Caio Benson NP

## 2020-06-03 NOTE — PROGRESS NOTES
[facility] Progress Note    Date: Christal 3, 2020    Name: Volodymyr Dailey. MRN: [id number]         : 1932      Mr. Susie Amaya arrived in the [facility] today at 1200, just after his office visit with Dr. Sean Gross, here for Q 4 Week, Retacrit Injection. He was assessed and education was provided. Mr. Rhett Kelly vitals were reviewed. Visit Vitals  /57 (BP 1 Location: Right arm, BP Patient Position: Sitting)   Pulse (!) 59   Temp 97.5 °F (36.4 °C)   Resp 20   SpO2 100%         No CBC was drawn in the [facility] today, because the CBC was drawn today, during his office visit with Dr Racheal Arevalo. Lab results were reviewed, and the preliminary Platelet count was noted to be 157,000. Recent Results (from the past 12 hour(s))   CBC WITH 3 PART DIFF    Collection Time: 20 11:54 AM   Result Value Ref Range    WBC 2.6 (L) 4.5 - 13.0 K/uL    RBC 3.57 (L) 4.10 - 5.10 M/uL    HGB 8.5 (L) 12.0 - 16 g/dL    HCT 29.8 (L) 36 - 48 %    MCV 83.5 78 - 102 FL    MCH 23.8 (L) 25.0 - 35.0 PG    MCHC 28.5 (L) 31 - 37 g/dL    RDW 20.4 (H) 11.5 - 14.5 %    NEUTROPHILS 60 40 - 70 %    MIXED CELLS 15 0.1 - 17 %    LYMPHOCYTES 25 14 - 44 %    ABS. NEUTROPHILS 1.5 (L) 1.8 - 9.5 K/UL    ABS. MIXED CELLS 0.4 0.0 - 2.3 K/uL    ABS. LYMPHOCYTES 0.7 (L) 1.1 - 5.9 K/UL    DF AUTOMATED               Retacrit (epoetin gisselle-epbx) 60,000 units, was administered SQ, in the back of his left arm at 1225, without incident. (The Retacrit was given in 2 SQ injections: 40,000 units in the 1st SQ Injection & 20,000 units in the 2nd SQ Injection. Mr. Susie Amaya tolerated well, and had no complaints. Mr. Susie Amaya was discharged from [place] [address] in stable condition at 1230. Geri Woodall He is to return in 4 weeks, on Wednesday, 20, at 1100,  for his next appointment, for his next CBC/Retacrit Injection.      Mae Neil RN  Christal 3, 2020  12:20 PM

## 2020-07-13 NOTE — PROGRESS NOTES
[facility] Lab Visit: 
 
Eliane Arita 11/14/1932 
[id number] 
 
1150:  Pt arrived ambulatory. Labs drawn peripherally in left hand and sent for processing. Pt did not meet parameters to receive Retacrit today. Pt scheduled to return in 1 mo. Departed [facility] ambulatory and in no distress. Visit Vitals /67 (BP 1 Location: Left arm, BP Patient Position: Sitting) Pulse 70 Temp 98.4 °F (36.9 °C) Resp 18 SpO2 95% Recent Results (from the past 12 hour(s)) AMB POC URINALYSIS DIP STICK AUTO W/O MICRO Collection Time: 07/13/20 11:07 AM  
Result Value Ref Range Color (UA POC) Yellow Clarity (UA POC) Clear Glucose (UA POC) Negative Negative Bilirubin (UA POC) Negative Negative Ketones (UA POC) Negative Negative Specific gravity (UA POC) 1.020 1.001 - 1.035 Blood (UA POC) Trace Negative pH (UA POC) 7.0 4.6 - 8.0 Protein (UA POC) Negative Negative Urobilinogen (UA POC) 0.2 mg/dL 0.2 - 1 Nitrites (UA POC) Negative Negative Leukocyte esterase (UA POC) Negative Negative CBC WITH 3 PART DIFF Collection Time: 07/13/20 11:50 AM  
Result Value Ref Range WBC 2.9 (L) 4.5 - 13.0 K/uL  
 RBC 3.98 (L) 4.10 - 5.10 M/uL HGB 9.9 (L) 12.0 - 16 g/dL HCT 33.1 (L) 36 - 48 % MCV 83.2 78 - 102 FL  
 MCH 24.9 (L) 25.0 - 35.0 PG  
 MCHC 29.9 (L) 31 - 37 g/dL  
 RDW 19.2 (H) 11.5 - 14.5 % NEUTROPHILS 46 40 - 70 % MIXED CELLS 16 0.1 - 17 % LYMPHOCYTES 38 14 - 44 % ABS. NEUTROPHILS 1.3 (L) 1.8 - 9.5 K/UL  
 ABS. MIXED CELLS 0.5 0.0 - 2.3 K/uL  
 ABS. LYMPHOCYTES 1.1 1.1 - 5.9 K/UL  
 DF AUTOMATED

## 2020-09-02 NOTE — PROGRESS NOTES
[facility] Progress Note Date: 2020 Name: Rochelle Macias. MRN: [id number] : 1932 Mr. Gee Perez arrived in the [facility] today at 1000, here for Q 4 Week, Retacrit Injection. He was assessed and education was provided. Mr. Jaime Horvath vitals were reviewed. Visit Vitals /61 (BP 1 Location: Left arm, BP Patient Position: Sitting) Pulse 62 Temp 98.2 °F (36.8 °C) Resp 18 SpO2 99% CBC obtained peripherally from left hand x 1 attempt. Gauze/coban applied to site. Pt tolerated well. Lab results were reviewed, and the preliminary Platelet count was noted to be 157,000. Recent Results (from the past 12 hour(s)) CBC WITH 3 PART DIFF Collection Time: 20 10:05 AM  
Result Value Ref Range WBC 3.2 (L) 4.5 - 13.0 K/uL  
 RBC 4.23 4. 10 - 5.10 M/uL  
 HGB 10.2 (L) 12.0 - 16 g/dL HCT 34.6 (L) 36 - 48 % MCV 81.8 78 - 102 FL  
 MCH 24.1 (L) 25.0 - 35.0 PG  
 MCHC 29.5 (L) 31 - 37 g/dL  
 RDW 17.0 (H) 11.5 - 14.5 % PLATELET 866 092 - 939 K/uL NEUTROPHILS 56 40 - 70 % MIXED CELLS 13 0.1 - 17 % LYMPHOCYTES 31 14 - 44 % ABS. NEUTROPHILS 1.8 1.8 - 9.5 K/UL  
 ABS. MIXED CELLS 0.4 0.0 - 2.3 K/uL  
 ABS. LYMPHOCYTES 1.0 (L) 1.1 - 5.9 K/UL  
 DF AUTOMATED Retacrit held today per order. Mr. Gee Perez tolerated well, and had no complaints. Mr. Gee Perez was discharged from [place] [address] in stable condition at 1010. He is to return in 4 weeks, on 20 at 1:30pm  for his next appointment, for his next CBC/Retacrit Injection. Rafaela Sims RN 2020

## 2020-09-09 NOTE — PATIENT INSTRUCTIONS
Learning About Monoclonal Gammopathy of Unknown Significance (MGUS) What is MGUS? Monoclonal gammopathy of unknown significance (MGUS) is a blood condition. The blood is made of many kinds of cells, including red blood cells, platelets, and white blood cells. With MGUS, a type of white blood cell called a plasma cell makes too much of the \"M\" (for \"monoclonal\") protein in the blood. Most people with MGUS are fine for many years. MGUS seldom causes symptoms or major health problems. In rare cases, MGUS may turn into multiple myeloma. This is a cancer of plasma cells in the blood that can cause bone weakness. Some people with MGUS may also have a higher risk for osteoporosis, in which bones become thin and weak. MGUS is sometimes seen in younger people, but is more common in people as they get older. It's seen most often in those over the age of 79. How is MGUS diagnosed? MGUS is often found by chance when blood tests are done for other reasons. If you have high levels of a certain protein in your blood, your doctor may order more tests. Blood tests can help identify the protein. The protein is sometimes found in the urine, so you may get a urine test too. Other tests may be done if your doctor thinks you might have a medical problem. In some cases, a bone marrow biopsy may be done to rule out a problem like multiple myeloma. How is it treated? Most people with MGUS don't need any treatment. But you may need regular physical exams, blood tests, and urine tests to make sure that MGUS isn't progressing to a medical problem. Follow-up care is a key part of your treatment and safety. Be sure to make and go to all appointments, and call your doctor if you are having problems. It's also a good idea to know your test results and keep a list of the medicines you take. Where can you learn more? Go to http://www.[employer].com/ Enter A917 in the search box to learn more about \"Learning About Monoclonal Gammopathy of Unknown Significance (MGUS). \" 
Current as of: November 8, 2019               Content Version: 12.6 © 3915-9546 [employer]. Care instructions adapted under license by [place] (which disclaims liability or warranty for this information). If you have questions about a medical condition or this instruction, always ask your healthcare professional. [address] any warranty or liability for your use of this information. Prostate Cancer: Care Instructions Your Care Instructions The prostate gland is a small, walnut-shaped organ that lies just below a man's bladder. It surrounds the urethra, the tube that carries urine from the bladder out of the body through the penis. Prostate cancer is the abnormal growth of cells in the prostate gland. Prostate cancer cells can spread within the prostate, to nearby lymph nodes and other tissues, and to other parts of the body. When the cancer hasn't spread outside the prostate, it is called localized prostate cancer. With localized prostate cancer, your options depend on how likely it is that your cancer will grow. Tests will show if your cancer is likely to grow. · Low-risk cancer isn't likely to grow right away. If your cancer is low-risk, you can choose active surveillance. This means your cancer will be watched closely by your doctor with regular checkups and tests to see if the cancer grows. This choice allows you to delay having surgery or radiation, often for many years. If the cancer grows very slowly, you may never need treatment. · Medium-risk cancer is more likely to grow. Some men with this type of cancer may be able to choose active surveillance. Others may need to choose surgery or radiation. · High-risk cancer is most likely to grow.  If you have high-risk cancer, you will likely need to choose surgery or radiation. If your cancer has already spread outside the prostate or to other parts of the body, then you may have other treatments, like chemotherapy or hormone therapy. If you are over age [de-identified] or have other serious health problems, like heart disease, you may choose not to have treatments to cure your cancer. Instead, you can just have treatments to manage your symptoms. This is called watchful waiting. Finding out that you have cancer is scary. You may feel many emotions and may need some help coping. Seek out family, friends, and counselors for support. You also can do things at home to make yourself feel better while you go through treatment. Call the [employer] (6-189.846.4045) or visit its website at [employer] system for more information. Follow-up care is a key part of your treatment and safety. Be sure to make and go to all appointments, and call your doctor if you are having problems. It's also a good idea to know your test results and keep a list of the medicines you take. How can you care for yourself at home? · Your doctor will talk to you about your treatment options. You may need to learn more about each of them before you can decide which treatment is best for you. · Take your medicines exactly as prescribed. Call your doctor if you think you are having a problem with your medicine. You will get more details on the specific medicines your doctor prescribes. · Eat healthy food. If you do not feel like eating, try to eat food that has protein and extra calories to keep up your strength and prevent weight loss. Drink liquid meal replacements for extra calories and protein. Try to eat your main meal early. · Take steps to control your stress and workload. Learn relaxation techniques. ? Share your feelings. Stress and tension affect our emotions. By expressing your feelings to others, you may be able to understand and cope with them. ? Consider joining a support group. Talking about a problem with your spouse, a good friend, or other people with similar problems is a good way to reduce tension and stress. ? Express yourself through art. Try writing, crafts, dance, or art to relieve stress. Some dance, writing, or art groups may be available just for people who have cancer. ? Be kind to your body and mind. Getting enough sleep, eating a healthy diet, and taking time to do things you enjoy can contribute to an overall feeling of balance in your life and can help reduce stress. ? Get help if you need it. Discuss your concerns with your doctor or counselor. · Get some physical activity every day, but do not get too tired. Keep doing the hobbies you enjoy as your energy allows. · If you are vomiting or have diarrhea: ? Drink plenty of fluids (enough so that your urine is light yellow or clear like water) to prevent dehydration. Choose water and other caffeine-free clear liquids. If you have kidney, heart, or liver disease and have to limit fluids, talk with your doctor before you increase the amount of fluids you drink. ? When you are able to eat, try clear soups, mild foods, and liquids until all symptoms are gone for 12 to 48 hours. Jell-O, dry toast, crackers, and cooked cereal are also good choices. · If you have not already done so, prepare a list of advance directives. Advance directives are instructions to your doctor and family members about what kind of care you want if you become unable to speak or express yourself. When should you call for help? Call 911 anytime you think you may need emergency care. For example, call if: 
  · You passed out (lost consciousness). Call your doctor now or seek immediate medical care if: 
  · You have new or worse pain.  
  · You have new symptoms, such as a cough, belly pain, vomiting, diarrhea, or a rash.  
  · You have symptoms of a urinary tract infection. For example: ? You have blood or pus in your urine. ? You have pain in your back just below your rib cage. This is called flank pain. ? You have a fever, chills, or body aches. ? It hurts to urinate. ? You have groin or belly pain. Watch closely for changes in your health, and be sure to contact your doctor if: 
  · You have swollen glands in your armpits, groin, or neck.  
  · You have trouble controlling your urine.  
  · You do not get better as expected. Where can you learn more? Go to http://www.[employer].com/ Enter V141 in the search box to learn more about \"Prostate Cancer: Care Instructions. \" Current as of: April 29, 2020               Content Version: 12.6 © 2244-5303 [employer]. Care instructions adapted under license by [employer] (which disclaims liability or warranty for this information). If you have questions about a medical condition or this instruction, always ask your healthcare professional. [address] any warranty or liability for your use of this information.

## 2020-09-09 NOTE — PROGRESS NOTES
Hematology/Oncology  Progress Note Name: Selvin Hamilton. Date: 2020 : 1932 Heide Mary NP  
 
Mr. Suzie Alvarado is a 80y.o. year old male who was seen for myriad medical problems including myelodysplastic syndrome and chronic anemia. 
  
Current therapy: Procrit 60,000 units every 4 weeks whenever the hemoglobin is below 10 g/dL and hematocrit is below 30%. Subjective:  
 
Mr. Suzie Alvarado is an 26-year-old -American man who has a long-standing history of myelodysplastic syndrome after bone marrow biopsied by Dr. Sergio Estrella. He was accompanied by his daughter today. He also has chronic renal insufficiency and thus chronic anemia. The patient has long-standing arthritic discomfort in his joints. Recently he was admitted for hematuria, likely related to cystitis. He does see a urology for for this. Today he reported feeling better. He denied any further distress. Denied fever, chills, night sweat, unintentional weight loss, skin lumps or bumps, acute bleeding or bruising issues. Denied headache, acute vision change, dizziness, chest pain, worsen shortness of breath, palpitation, productive cough, nausea, vomiting, abdominal pain, altered bowel habits, hematuria, or worsening dysuria, focal numbness or weakness Past medical history, family history, and social history: these were reviewed and remains unchanged. Past Medical History:  
Diagnosis Date  Arthritis  Bladder cancer ([facility]  Diabetes ([facility] [facility] INsulin dependent  Gout  H/O CHF   
 acute  Heart abnormality  Myelodysplastic syndrome, unspecified ([facility] [facility]  Personal history of prostate cancer  Renal cyst   
 
Past Surgical History:  
Procedure Laterality Date  HX BACK SURGERY    
 HX CATARACT REMOVAL    
 HX HERNIA REPAIR    
 HX KNEE REPLACEMENT Social History Socioeconomic History  Marital status:  Spouse name: Not on file  Number of children: Not on file  Years of education: Not on file  Highest education level: Not on file Occupational History  Not on file Social Needs  Financial resource strain: Not on file  Food insecurity Worry: Not on file Inability: Not on file  Transportation needs Medical: Not on file Non-medical: Not on file Tobacco Use  Smoking status: Former Smoker Types: Cigarettes Last attempt to quit: 1994 Years since quittin.3  Smokeless tobacco: Never Used Substance and Sexual Activity  Alcohol use: No  
 Drug use: No  
 Sexual activity: Not on file Lifestyle  Physical activity Days per week: Not on file Minutes per session: Not on file  Stress: Not on file Relationships  Social connections Talks on phone: Not on file Gets together: Not on file Attends Restorationism service: Not on file Active member of club or organization: Not on file Attends meetings of clubs or organizations: Not on file Relationship status: Not on file  Intimate partner violence Fear of current or ex partner: Not on file Emotionally abused: Not on file Physically abused: Not on file Forced sexual activity: Not on file Other Topics Concern  Not on file Social History Narrative  Not on file Family History Problem Relation Age of Onset  Diabetes Mother  Stroke Mother Current Outpatient Medications Medication Sig Dispense Refill  Lantus U-100 Insulin 100 unit/mL injection INJECT 14 UNITS BENEATH THE SKIN EVERY NIGHT AT BEDTIME  mirabegron ER (Myrbetriq) 25 mg ER tablet Take 1 Tab by mouth daily. 90 Tab 3  
 isosorbide mononitrate ER (IMDUR) 60 mg CR tablet take 1 tablet by mouth once daily  metOLazone (ZAROXOLYN) 2.5 mg tablet TAKE 1 TABLET BY MOUTH 3 TIMES A WEEK    
 brinzolamide (Azopt) 1 % ophthalmic suspension 1 drop OU QHS.  brimonidine (ALPHAGAN) 0.2 % ophthalmic solution 1 drop OU QHS.  trazodone HCl (TRAZODONE PO) Take  by mouth.  bumetanide (BUMEX) 2 mg tablet 2 mg.  clopidogrel (PLAVIX) 75 mg tab 75 mg.    
 rosuvastatin (CRESTOR) 20 mg tablet 20 mg.    
 colchicine 0.6 mg tablet take 1 tablet by mouth once daily  0  
 ferrous sulfate 325 mg (65 mg iron) tablet TAKE 1 TABLET BY MOUTH ONCE DAILY WITH BREAKFAST. TAKE WITH VITAMIN C ON EMPTY STOMACH  0  
 ergocalciferol (VITAMIN D2) 50,000 unit capsule Take 1 Cap by mouth every seven (7) days. Indications: Vitamin D Deficiency 12 Cap 0  
 allopurinol (ZYLOPRIM) 100 mg tablet Take  by mouth daily.  carvedilol (COREG) 25 mg tablet take 1 tablet by mouth twice a day  0  
 aspirin delayed-release 81 mg tablet Take  by mouth daily.  nitroglycerin (NITROSTAT) 0.4 mg SL tablet by SubLINGual route every five (5) minutes as needed.  gabapentin (NEURONTIN) 300 mg capsule Take 300 mg by mouth three (3) times daily. Review of Systems Constitutional: The patient has no acute distress or discomfort. HEENT: The patient denies recent head trauma, eye pain, blurred vision,  hearing deficit, oropharyngeal mucosal pain or lesions, and the patient denies throat pain or discomfort. Lymphatics: The patient denies palpable peripheral lymphadenopathy. Hematologic: The patient denies having bruising, bleeding, or progressive fatigue. Respiratory: Patient denies having shortness of breath, cough, sputum production, fever, or dyspnea on exertion. Cardiovascular: The patient denies having leg pain, leg swelling, heart palpitations, chest permit, chest pain, or lightheadedness. The patient denies having dyspnea on exertion. Gastrointestinal: The patient denies having nausea, emesis, or diarrhea. The patient denies having any hematemesis or blood in the stool. Genitourinary: Patient denies having urinary urgency, frequency, or dysuria. The patient denies having blood in the urine. Psychological: The patient denies having symptoms of nervousness, anxiety, depression, or thoughts of harming himself some of this. Skin: Patient denies having skin rashes, skin, ulcerations, or unexplained itching or pruritus. Musculoskeletal: The patient denies having pain in the joints or bones. Objective:  
 
Visit Vitals /65 (BP Patient Position: Sitting) Pulse 70 Temp 98.5 °F (36.9 °C) (Oral) Resp 16 Wt 111.2 kg (245 lb 3.2 oz) SpO2 96% BMI 33.26 kg/m² ECOG PS1 Physical Exam:  
Gen. Appearance: The patient is in no acute distress. Skin: There is no bruise or rash. HEENT: The exam is unremarkable. Neck: Supple without lymphadenopathy or thyromegaly. Lungs: Clear to auscultation and percussion; there are no wheezes or rhonchi. Heart: Regular rate and rhythm; there are no murmurs, gallops, or rubs. Abdomen: Bowel sounds are present and normal.  There is no guarding, tenderness, or hepatosplenomegaly. Extremities: There is no clubbing, cyanosis, or edema. Neurologic: There are no focal neurologic deficits. Lymphatics: There is no palpable peripheral lymphadenopathy. Musculoskeletal: The patient has full range of motion at all joints. There is no evidence of joint deformity or effusions. There is no focal joint tenderness. Psychological/psychiatric: There is no clinical evidence of anxiety, depression, or melancholy. CT Results (most recent): 
Results from Abstract encounter on 19 CT ABD PELV W CONT [address] [facility] Cat Scan 
[facility] [address] [place] [address] 
[phone]  
  
Imaging Result 
  
Name:   
Edna Roldan. ([id number]) Sex: Male : 
1932 
   
Ordering Provider: Cathy SELBY Provider:   
  
  Diagnosis: 
Prostate cancer ([facility] [address] [place] (ICD-10-CM)] None Specified 
  
  
      
Procedures Performed: Exam Date/Time:   
CT ABD/PELVIS-IV AND ORAL 
  2019  1:42 PM     
  
  
Impression   
No CT evidence of metastatic disease in the abdomen/pelvis. 
  
Mild gallbladder wall thickening most likely artifactual due to contraction/incomplete distention of the gallbladder. Heterogeneity within the gallbladder lumen likely related to gallstones. Ultrasound could be considered for further evaluation if there is clinical concern. 
  
Additional chronic/incidental findings as described. 
  
  
  
Signed By: Olivia Bansal MD on 3/26/2019 8:14 AM 
  
  
Narrative EXAM: CT ABD/PELVIS-IV AND ORAL 
  
CLINICAL INDICATION/HISTORY: History of prostate cancer, follow-up 
  
COMPARISON: 6/5/2018 
  
TECHNIQUE: Helical CT imaging of the abdomen and pelvis was performed with intravenous contrast. Multiplanar reformats were generated. One or more dose reduction techniques were used on this CT: automated exposure control, adjustment of the mAs and/or kVp according to patient size, and iterative reconstruction techniques.  The specific techniques used on this CT exam have been documented in the patient's electronic medical record.  Digital Imaging and Communications in Medicine (DICOM) format image data are available to nonaffiliated external healthcare facilities or entities on a secure, media free, reciprocally searchable basis with patient authorization for at least a 12-month period after this study. 
  
_______________ 
  
FINDINGS: 
  
LOWER CHEST: No acute finding. Linear bands of minor scarring and/or atelectasis in the lung bases. Mild cardiomegaly. . 
  
LIVER, BILIARY: Liver is normal. No biliary dilation. Gallbladder demonstrates mild wall thickening and internal heterogeneity likely representing gallstones. . 
  
PANCREAS: Normal. 
  
SPLEEN: Normal. 
  
ADRENALS: Normal. 
  
KIDNEYS/URETERS/BLADDER: Bilateral simple appearing small cysts. Otherwise unremarkable. . 
  
PELVIC ORGANS: Unremarkable. 
  
LYMPH NODES: No enlarged lymph nodes. 
  
GASTROINTESTINAL TRACT: No bowel dilation or wall thickening.   
VASCULATURE: No AAA. 
  
BONES: No acute or aggressive osseous abnormalities identified. L3-S1 fusion hardware is intact. Mild compression deformity of L2 inferior endplate and adjacent sclerotic change again noted, stable. 
  
OTHER: None. 
  
_______________ 
  
  
Dictated by: Primo Bullock on Tue Mar 26, 2019  8:08:05 AM EDT  
  
Signed By:Brittani Zepeda MD 
 3/26/2019  8:14 AM 
  
  
  
  
  
 
  
[address] [address] [facility] [[id number]] ~~This report was copied and pasted from the servicing EHR system. ~~ 
  
 
 
Lab data: 
   
Results for orders placed or performed during the hospital encounter of 09/02/20 CBC WITH 3 PART DIFF     Status: Abnormal  
Result Value Ref Range Status WBC 3.2 (L) 4.5 - 13.0 K/uL Final  
 RBC 4.23 4. 10 - 5.10 M/uL Final  
 HGB 10.2 (L) 12.0 - 16 g/dL Final  
 HCT 34.6 (L) 36 - 48 % Final  
 MCV 81.8 78 - 102 FL Final  
 MCH 24.1 (L) 25.0 - 35.0 PG Final  
 MCHC 29.5 (L) 31 - 37 g/dL Final  
 RDW 17.0 (H) 11.5 - 14.5 % Final  
 PLATELET 109 386 - 183 K/uL Final  
 NEUTROPHILS 56 40 - 70 % Final  
 MIXED CELLS 13 0.1 - 17 % Final  
 LYMPHOCYTES 31 14 - 44 % Final  
 ABS. NEUTROPHILS 1.8 1.8 - 9.5 K/UL Final  
 ABS. MIXED CELLS 0.4 0.0 - 2.3 K/uL Final  
 ABS. LYMPHOCYTES 1.0 (L) 1.1 - 5.9 K/UL Final  
  Comment: Test performed at [address] [place] [address] or Outpatient Infusion Center Location. Reviewed by Medical Director. DF AUTOMATED   Final  
 
 
   
Assessment:  
 
1. Myelodysplastic syndrome ([address] 2. Prostate cancer ([address] 3. Thrombocytopenia ([address] 4. Anemia due to stage 4 chronic kidney disease ([address] 5. Malignant neoplasm of posterior wall of urinary bladder (HCC) 6. Anemia due to chronic kidney disease, unspecified CKD stage 7. Malignant neoplasm of urinary bladder, unspecified site ([address] Plan: Myelodysplastic syndrome, Anemia:  
--The patient has a long history of MDS after a bone marrow biopsy performed by Dr. Kylie De Santiago. He has been receiving Retacrit at a dose of 60,000 units whenever his hemoglobin is below 10 g/dL and hematocrit is below 30%. The patient would like to continue current regimen. -- We will continue to monitor CBC. Recent H/H of 10.2 g/dl and 34.6% 
  
Leukopenia:  
--His neutropenia has been fluctuating around [address] of 1.8K. He denied any recurrent infections. Probably is related to his MDS. --We will continue to monitor 
  Thrombocytopenia:  
-- Previous  platelet counts was relatively stable at 171,000 from 9/2/2020. Therapeutic intervention not necessary unless the platelet counts decline below 30,000. 
  
History of prostate cancer:  
-- The patient is clinically stable. -- The patient continues to receive Eligard every 4 months. -- PSA level done on 9/1/2020 was 0.580. 
-- He will  continue to follow-up with his urology 
  
Recent hematuria, history of bladder cancer:   
--He was admitted to the hospital recently for hematuria, likely related to cystitis. -- He continues to have routine follow-up appts with the urologist tomorrow. 
  
 Gouty arthritis/chronic arthritis:  
-- The patient is using Tramadol which provides adequate relief. We previously referred the patient to a different rheumatologist for an assessment to assist in the management of his chronic arthritis and gouty arthritis.    
  
-- I will see the patient back in clinic in about 3-4 months. Always sooner if required. The patient can have lab done prior our next clinic visit. 
  
No orders of the defined types were placed in this encounter. Trung Gibbons NP 
9/9/2020 Please note: This document has been produced using voice recognition software. Unrecognized errors in transcription may be present.

## 2020-09-30 NOTE — PROGRESS NOTES
[facility] Progress Note Date: 2020 Name: Sarah Rocha. MRN: [id number] : 1932 Mr. Kadie Treesa did NOT show up today, Wednesday, 20, for his scheduled [facility] appointment for CBC/Retacrit Injection. So, I called and spoke with his daughter regarding the missed appointment, and she stated that she plans to call and reschedule the appointment for her Dad, to a more convenient day, when he is feeling better. Sincere Rojas RN 2020 
1:45 PM

## 2020-12-01 NOTE — PROGRESS NOTES
[facility] Progress Note Date: 2020 Name: Marco A Gomez MRN: [id number] : 1932 Peripheral Lab Draw Mr. Matta to [facility], ambulatory at 1110 accompanied by self. Pt was assessed and education was provided. Mr. Gisel Winter vitals were reviewed and patient was observed for 5 minutes prior to treatment. Visit Vitals /71 (BP 1 Location: Right arm, BP Patient Position: Sitting) Pulse 70 Temp 98.7 °F (37.1 °C) SpO2 97% Recent Results (from the past 12 hour(s)) CBC WITH 3 PART DIFF Collection Time: 20 11:00 AM  
Result Value Ref Range WBC 3.2 (L) 4.5 - 13.0 K/uL  
 RBC 4.38 4.10 - 5.10 M/uL  
 HGB 10.4 (L) 12.0 - 16 g/dL HCT 35.0 (L) 36 - 48 % MCV 79.9 78 - 102 FL  
 MCH 23.7 (L) 25.0 - 35.0 PG  
 MCHC 29.7 (L) 31 - 37 g/dL  
 RDW 18.3 (H) 11.5 - 14.5 % PLATELET 172 955 - 820 K/uL NEUTROPHILS 57 40 - 70 % MIXED CELLS 15 0.1 - 17 % LYMPHOCYTES 29 14 - 44 % ABS. NEUTROPHILS 1.8 1.8 - 9.5 K/UL  
 ABS. MIXED CELLS 0.5 0.0 - 2.3 K/uL  
 ABS. LYMPHOCYTES 0.9 (L) 1.1 - 5.9 K/UL  
 DF AUTOMATED Blood obtained peripherally from right hand first attempt with butterfly needle and sent to lab for CBC w/ Diff per written orders. No bleeding or hematoma noted at site. Gauze and coban applied. Mr. Miguel Hunt tolerated the venipuncture, and had no complaints. Patient armband removed and shredded. Mr. Miguel Hunt was discharged from [place] [address] in stable condition at 1125. August Artem Phlebotomist PCT 2020 
2:02 PM

## 2021-02-02 ENCOUNTER — HOSPITAL ENCOUNTER (OUTPATIENT)
Dept: INFUSION THERAPY | Age: 86
End: 2021-02-02

## 2021-03-02 ENCOUNTER — APPOINTMENT (OUTPATIENT)
Dept: INFUSION THERAPY | Age: 86
End: 2021-03-02

## 2021-07-22 NOTE — PROGRESS NOTES
Labs reviewed and noted. Will continue to monitor. Patient seen for a Respiratory Care Evaluation for JOSÉ MANUEL. Patient does not wish to be set up with a cpap at this time. Per RN- will monitor and supplement with O2 if needed. On JOSÉ MANUEL Protocol.